# Patient Record
Sex: FEMALE | Race: WHITE | NOT HISPANIC OR LATINO | Employment: PART TIME | URBAN - METROPOLITAN AREA
[De-identification: names, ages, dates, MRNs, and addresses within clinical notes are randomized per-mention and may not be internally consistent; named-entity substitution may affect disease eponyms.]

---

## 2017-01-16 DIAGNOSIS — F33.0 MAJOR DEPRESSIVE DISORDER, RECURRENT EPISODE, MILD (H): Primary | ICD-10-CM

## 2017-01-16 RX ORDER — CITALOPRAM HYDROBROMIDE 20 MG/1
20 TABLET ORAL DAILY
Qty: 90 TABLET | Refills: 1 | Status: CANCELLED | OUTPATIENT
Start: 2017-01-16

## 2017-01-18 ENCOUNTER — E-VISIT (OUTPATIENT)
Dept: FAMILY MEDICINE | Facility: CLINIC | Age: 42
End: 2017-01-18
Payer: COMMERCIAL

## 2017-01-18 DIAGNOSIS — F33.0 MAJOR DEPRESSIVE DISORDER, RECURRENT EPISODE, MILD (H): Primary | ICD-10-CM

## 2017-01-18 PROCEDURE — 99444 ZZC PHYSICIAN ONLINE EVALUATION & MANAGEMENT SERVICE: CPT | Performed by: FAMILY MEDICINE

## 2017-01-18 NOTE — TELEPHONE ENCOUNTER
Per last OV notes:  (F33.0) Major depressive disorder, recurrent episode, mild (H)  Comment: doing well  Plan: citalopram (CELEXA) 20 MG tablet         Refill x 6 months, ok to do e-visit in 6 months for refill    My chart message sent with instructions to set up an E-visit.   Will await to make sure this is set up.     Katherine Hardy RN   [facility]

## 2017-01-19 RX ORDER — CITALOPRAM HYDROBROMIDE 20 MG/1
20 TABLET ORAL DAILY
Qty: 90 TABLET | Refills: 1 | Status: SHIPPED | OUTPATIENT
Start: 2017-01-19 | End: 2017-07-13

## 2017-01-30 DIAGNOSIS — E28.2 PCOS (POLYCYSTIC OVARIAN SYNDROME): Primary | ICD-10-CM

## 2017-04-24 ENCOUNTER — TELEPHONE (OUTPATIENT)
Dept: FAMILY MEDICINE | Facility: CLINIC | Age: 42
End: 2017-04-24

## 2017-04-24 NOTE — TELEPHONE ENCOUNTER
Zyrtec-D is not available as a generic at this time.  Pt is willing to try a comparable alternative.  Sharonda Cano M.A.

## 2017-04-24 NOTE — TELEPHONE ENCOUNTER
Pt needs to contact insurance, any alternative I order shows not covered.  This may not be a covered med.  I can give her a prescription for the med and she can call around for pricing at various pharmacies.

## 2017-04-25 NOTE — TELEPHONE ENCOUNTER
Notified patient of message below. States she will speak to pharmacy to get alternatives and will let us know./Vivian Paul,

## 2017-04-27 ENCOUNTER — MYC MEDICAL ADVICE (OUTPATIENT)
Dept: FAMILY MEDICINE | Facility: CLINIC | Age: 42
End: 2017-04-27

## 2017-04-27 DIAGNOSIS — J31.0 CHRONIC RHINITIS: Primary | ICD-10-CM

## 2017-04-27 RX ORDER — CETIRIZINE HYDROCHLORIDE 10 MG/1
10 TABLET ORAL EVERY EVENING
Qty: 90 TABLET | Refills: 3 | Status: SHIPPED | OUTPATIENT
Start: 2017-04-27 | End: 2017-07-13

## 2017-05-04 ENCOUNTER — TELEPHONE (OUTPATIENT)
Dept: FAMILY MEDICINE | Facility: CLINIC | Age: 42
End: 2017-05-04

## 2017-05-04 NOTE — TELEPHONE ENCOUNTER
Panel Management Review      Patient has the following on her problem list: None      Composite cancer screening  Chart review shows that this patient is due/due soon for the following Pap Smear  Summary:    Patient is due/failing the following:   PAP    Action needed:   Patient needs office visit for AFE .    Type of outreach:    Sent [employer] message.    Questions for provider review:    None                                                                                                                                    Lizz Rachel [facility]       Chart routed to Care Team .

## 2017-05-22 DIAGNOSIS — J31.0 CHRONIC RHINITIS: ICD-10-CM

## 2017-05-22 RX ORDER — CETIRIZINE HCL 10 MG
TABLET ORAL
Qty: 180 TABLET | Refills: 3 | Status: SHIPPED | OUTPATIENT
Start: 2017-05-22 | End: 2018-04-02

## 2017-05-26 ENCOUNTER — OFFICE VISIT (OUTPATIENT)
Dept: DERMATOLOGY | Facility: CLINIC | Age: 42
End: 2017-05-26
Payer: COMMERCIAL

## 2017-05-26 DIAGNOSIS — D18.01 CHERRY ANGIOMA: ICD-10-CM

## 2017-05-26 DIAGNOSIS — D22.9 MULTIPLE BENIGN NEVI: Primary | ICD-10-CM

## 2017-05-26 DIAGNOSIS — Z86.018 HISTORY OF DYSPLASTIC NEVUS: ICD-10-CM

## 2017-05-26 PROCEDURE — 99213 OFFICE O/P EST LOW 20 MIN: CPT | Performed by: DERMATOLOGY

## 2017-05-26 NOTE — LETTER
5/26/2017       RE: Ivania Gomez  [address] [facility] [place] [address]     Dear Colleague,    Thank you for referring your patient, Ivania Gomez, to the [facility] acute hospital. Please see a copy of my visit note below.    [facility] Dermatology Note      Dermatology Problem List:  1. History of dysplastic nevi  -Collision of two compound dysplastic nevi, both with mild atypia, left upper arm, s/p biopsy 5/17/2016  -Compound dysplastic melanocytic nevus, central chest, 6/14/2012    -Junctional dysplastic nevus with moderate atypia, margins narrowly free, 2010, right arm medial  -Compound dysplastic nevus with mild atypia, margins free, 2010, right arm lateral  -Compound dysplastic nevus with moderate atypia, margins free, left forearm, 2010  -Compound dysplastic nevus with mild atypia, left chest, 2010  -Compound dysplastic nevus with mild atypia, margins free, right inner arm, 2010  -Compound dysplastic nevus with moderate atypia, right ear, 2009  -Junctional dysplastic nevus with moderate atypia, right buttock, s/p excision 2009  -Junctional dysplastic nevus with moderate ayptia, negative margin, right posterior shoulder, 2009  -Inflamed junctional dysplastic nevus with moderate atypica, right superior shoulder, neg margins, 2009  -Junctional dysplastic nevus with moderate atypia, right anterior arm, 2009  -Junctional dysplastic nevus with moderate atypia, left superior shoulder, left inferior shoulder, left inner arm, 2009  -Dysplastic nevi, right chest and left breast  2.Irritated compound nevus, left buttock with mild to moderate cystologic atypia, 2007  3. Acne vulgaris  -Current Tx: Tretinoin 0.025% cream  -Previous Tx:  Differin, minocycline with dyspigmentation, doxycycline  4. Halo nevus, right neck, 2010    Encounter Date: May 26, 2017    CC:  Chief Complaint   Patient presents with     Derm Problem     1 yr skin check           History of Present Illness:  Ms. Ivania Gomez is a 41 year old female who presents as a follow-up for history of dysplastic nevi. The patient was last seen 5/17/2016 when tretinoin 0.025% cream was continued for acne and a biopsy was performed on the left upper arm. Today, the patient reports a lesion on the right forearm that she has had for a while but noticed a new blue coloration today.The patient reports no other lesions of concern.    Past Medical History:   Patient Active Problem List   Diagnosis     CARDIOVASCULAR SCREENING; LDL GOAL LESS THAN 160     Mild major depression (H)     PCOS (polycystic ovarian syndrome)     S/P laparoscopic cholecystectomy     Acne vulgaris     History of dysplastic nevus     Melanocytic nevus     History of gestational diabetes     Chronic rhinitis     Hypertriglyceridemia     Past Medical History:   Diagnosis Date     Acne vulgaris      Allergies      Anxiety      ASCUS on Pap smear 1994    colp benign     Depressive disorder     post partum and anxiety issues     Diabetes mellitus (H)      History of dysplastic nevus      Melanocytic nevus 6/14/2012     Obesity, unspecified      PCOS (polycystic ovarian syndrome)      Past Surgical History:   Procedure Laterality Date     CHOLECYSTECTOMY  2011     COLPOSCOPY CERVIX, BIOPSY CERVIX, ENDOCERVICAL CURETTAGE, COMBINED  1994    benign     EYE SURGERY  2009    lasix     EYE SURGERY      Lasik     Social History:  The patient works as an . The patient uses 1-2  alcoholic beverages per week. The patient denies use of tanning beds.    Family History:  There is no family history of melanoma.  There is no family history of asthma, eczema, psoriasis, or hay fever.   Family hx of dysplastic nevi in mother and sister.     Medications:  Current Outpatient Prescriptions   Medication Sig Dispense Refill     [employer] ALLERGY RELIEF-D 5-120 MG per 12 hr tablet TAKE ONE TAB BY MOUTH TWICE DAILY 180 tablet 3     cetirizine  (ZYRTEC) 10 MG tablet Take 1 tablet (10 mg) by mouth every evening 90 tablet 3     metFORMIN (GLUCOPHAGE) 500 MG tablet Take 1 tablet (500 mg) by mouth 2 times daily (with meals) 180 tablet 1     citalopram (CELEXA) 20 MG tablet Take 1 tablet (20 mg) by mouth daily 90 tablet 1     norethindrone-ethinyl estradiol (JUNEL FE 1/20) 1-20 MG-MCG per tablet Take 1 tablet by mouth daily 84 tablet 3     fluticasone (FLONASE) 50 MCG/ACT nasal spray Spray 1 spray into both nostrils 2 times daily 48 g 3     LORazepam (ATIVAN) 1 MG tablet Take 1 tablet 30 minutes prior to flight and as needed for severe anxiety. 20 tablet 0     multivitamin, therapeutic with minerals (MULTI-VITAMIN) TABS Take 1 tablet by mouth daily       Allergies   Allergen Reactions     Ibuprofen Hives and Swelling     Naproxen Hives and Swelling     Review of Systems:  -Const: The patient is generally feeling well today.   -Skin: As above in HPI. No additional skin concerns.     Physical exam:  GEN: This is a well developed, well-nourished female in no acute distress, in a pleasant mood.    SKIN: Total skin excluding the undergarment areas was performed. The exam included the head/face, neck, both arms, chest, back, abdomen, both legs, digits and/or nails. Declines genital exam.   -4mm pink papule with blue to violaceous coloration laterally on the right forearm.   -There are bright red some shaped papules scattered on the trunk.   -Multiple regular brown pigmented macules and papules are identified on the trunk and extremities.   -No other lesions of concern on areas examined.     Impression/Plan:  1. History of dysplastic nevi, no clinical evidence of recurrence  2. Cherry angiomas, trunk    No further intervention required at this time.   3. Multiple clinically benign nevi, trunk and extremities    No further intervention required at this time.   4. 4mm pink papule with blue to violaceous coloration laterally, right forearm. Possibly traumatic in nature  as only present one day. I reviewed with her this could be a melanoma. She would like to defer biopsy to the next 2-3 weeks    Discussed clinical monitoring versus biopsy. She understand we could be delaying diagnosis. She wants to hold off at this time    Photo obtained     Follow-up in 2 weeks earlier for new or changing lesions    Staff Involved:  Scribe/Staff    Scribe Disclosure:   I, Oralia Wandy, am serving as a scribe to document services personally performed by Dr. Charlee Reaves, based on data collection and the provider's statements to me.       Provider Disclosure:   The documentation recorded by the scribe accurately reflects the services I personally performed and the decisions made by me.    Charlee Reaves MD    Department of Dermatology  [facility]: Phone: [phone], Fax:[phone]  [facility]: Phone: [phone], Fax: [phone]        Again, thank you for allowing me to participate in the care of your patient.      Sincerely,    Charlee Reaves MD

## 2017-05-26 NOTE — PATIENT INSTRUCTIONS
Return for possible biopsy in 2 weeks as we need to make sure this is not a skin cancer on the right hand

## 2017-05-26 NOTE — MR AVS SNAPSHOT
After Visit Summary   5/26/2017    Ivania Gomez    MRN: [id number]           Patient Information     Date Of Birth          1975        Visit Information        Provider Department      5/26/2017 12:30 PM Charlee Reaves MD [facility] [[email or web address]]        Today's Diagnoses     Multiple benign nevi    -  1    Cherry angioma        History of dysplastic nevus          Care Instructions    Return for possible biopsy in 2 weeks as we need to make sure this is not a skin cancer on the right hand          Follow-ups after your visit        Your next 10 appointments already scheduled     Jul 13, 2017 11:35 AM CDT   PHYSICAL with Jacquelyn Zaldivar MD   [facility] ([facility])    [address] [address] [address] [phone]              Who to contact     If you have questions or need follow up information about today's clinic visit or your schedule please contact [facility] directly at [phone].  Normal or non-critical lab and imaging results will be communicated to you by Pebblehart, letter or phone within 4 business days after the clinic has received the results. If you do not hear from us within 7 days, please contact the clinic through Lootsie or phone. If you have a critical or abnormal lab result, we will notify you by phone as soon as possible.  Submit refill requests through Lootsie or call your pharmacy and they will forward the refill request to us. Please allow 3 business days for your refill to be completed.          Additional Information About Your Visit        Pebblehart Information     Lootsie gives you secure access to your electronic health record. If you see a primary care provider, you can also send messages to your care team and make appointments. If you have questions, please call your primary care clinic.  If you do not have a primary care provider, please call [phone] and they will assist you.      Lootsie is an  electronic gateway that provides easy, online access to your medical records. With [employer], you can request a clinic appointment, read your test results, renew a prescription or communicate with your care team.     To access your existing account, please contact your [facility] or call [phone] for assistance.        Care EveryWhere ID     This is your Care EveryWhere ID. This could be used by other organizations to access your [facility] medical records  [id number]         Blood Pressure from Last 3 Encounters:   07/12/16 129/88   06/02/15 128/87   04/14/14 130/77    Weight from Last 3 Encounters:   07/12/16 78.9 kg (174 lb)   06/02/15 76.2 kg (168 lb)   04/14/14 81.6 kg (180 lb)              Today, you had the following     No orders found for display       Primary Care Provider Office Phone # Fax #    Jacquelyn Zaldivar [phone]       [facility] [address] [address] [address]        Thank you!     Thank you for choosing [facility]  for your care. Our goal is always to provide you with excellent care. Hearing back from our patients is one way we can continue to improve our services. Please take a few minutes to complete the written survey that you may receive in the mail after your visit with us. Thank you!             Your Updated Medication List - Protect others around you: Learn how to safely use, store and throw away your medicines at www.disposemymeds.org.          This list is accurate as of: 5/26/17  1:27 PM.  Always use your most recent med list.                   Brand Name Dispense Instructions for use    cetirizine 10 MG tablet    zyrTEC    90 tablet    Take 1 tablet (10 mg) by mouth every evening       citalopram 20 MG tablet    celeXA    90 tablet    Take 1 tablet (20 mg) by mouth daily       [employer]S ALLERGY RELIEF-D 5-120 MG per 12 hr tablet   Generic drug:  cetirizine-psuedoePHEDrine     180 tablet    TAKE  ONE TAB BY MOUTH TWICE DAILY       fluticasone 50 MCG/ACT spray    FLONASE    48 g    Spray 1 spray into both nostrils 2 times daily       LORazepam 1 MG tablet    ATIVAN    20 tablet    Take 1 tablet 30 minutes prior to flight and as needed for severe anxiety.       metFORMIN 500 MG tablet    GLUCOPHAGE    180 tablet    Take 1 tablet (500 mg) by mouth 2 times daily (with meals)       Multi-vitamin Tabs tablet      Take 1 tablet by mouth daily       norethindrone-ethinyl estradiol 1-20 MG-MCG per tablet    JUNEL FE 1/20    84 tablet    Take 1 tablet by mouth daily

## 2017-05-26 NOTE — PROGRESS NOTES
[facility] Dermatology Note      Dermatology Problem List:  1. History of dysplastic nevi  -Collision of two compound dysplastic nevi, both with mild atypia, left upper arm, s/p biopsy 5/17/2016  -Compound dysplastic melanocytic nevus, central chest, 6/14/2012    -Junctional dysplastic nevus with moderate atypia, margins narrowly free, 2010, right arm medial  -Compound dysplastic nevus with mild atypia, margins free, 2010, right arm lateral  -Compound dysplastic nevus with moderate atypia, margins free, left forearm, 2010  -Compound dysplastic nevus with mild atypia, left chest, 2010  -Compound dysplastic nevus with mild atypia, margins free, right inner arm, 2010  -Compound dysplastic nevus with moderate atypia, right ear, 2009  -Junctional dysplastic nevus with moderate atypia, right buttock, s/p excision 2009  -Junctional dysplastic nevus with moderate ayptia, negative margin, right posterior shoulder, 2009  -Inflamed junctional dysplastic nevus with moderate atypica, right superior shoulder, neg margins, 2009  -Junctional dysplastic nevus with moderate atypia, right anterior arm, 2009  -Junctional dysplastic nevus with moderate atypia, left superior shoulder, left inferior shoulder, left inner arm, 2009  -Dysplastic nevi, right chest and left breast  2.Irritated compound nevus, left buttock with mild to moderate cystologic atypia, 2007  3. Acne vulgaris  -Current Tx: Tretinoin 0.025% cream  -Previous Tx:  Differin, minocycline with dyspigmentation, doxycycline  4. Halo nevus, right neck, 2010    Encounter Date: May 26, 2017    CC:  Chief Complaint   Patient presents with     Derm Problem     1 yr skin check          History of Present Illness:  Ms. Ivania Gomez is a 41 year old female who presents as a follow-up for history of dysplastic nevi. The patient was last seen 5/17/2016 when tretinoin 0.025% cream was continued for acne and a biopsy was performed on the left upper arm. Today, the  patient reports a lesion on the right forearm that she has had for a while but noticed a new blue coloration today.The patient reports no other lesions of concern.    Past Medical History:   Patient Active Problem List   Diagnosis     CARDIOVASCULAR SCREENING; LDL GOAL LESS THAN 160     Mild major depression (H)     PCOS (polycystic ovarian syndrome)     S/P laparoscopic cholecystectomy     Acne vulgaris     History of dysplastic nevus     Melanocytic nevus     History of gestational diabetes     Chronic rhinitis     Hypertriglyceridemia     Past Medical History:   Diagnosis Date     Acne vulgaris      Allergies      Anxiety      ASCUS on Pap smear 1994    colp benign     Depressive disorder     post partum and anxiety issues     Diabetes mellitus (H)      History of dysplastic nevus      Melanocytic nevus 6/14/2012     Obesity, unspecified      PCOS (polycystic ovarian syndrome)      Past Surgical History:   Procedure Laterality Date     CHOLECYSTECTOMY  2011     COLPOSCOPY CERVIX, BIOPSY CERVIX, ENDOCERVICAL CURETTAGE, COMBINED  1994    benign     EYE SURGERY  2009    lasix     EYE SURGERY      Lasik     Social History:  The patient works as an . The patient uses 1-2  alcoholic beverages per week. The patient denies use of tanning beds.    Family History:  There is no family history of melanoma.  There is no family history of asthma, eczema, psoriasis, or hay fever.   Family hx of dysplastic nevi in mother and sister.     Medications:  Current Outpatient Prescriptions   Medication Sig Dispense Refill     CVS ALLERGY RELIEF-D 5-120 MG per 12 hr tablet TAKE ONE TAB BY MOUTH TWICE DAILY 180 tablet 3     cetirizine (ZYRTEC) 10 MG tablet Take 1 tablet (10 mg) by mouth every evening 90 tablet 3     metFORMIN (GLUCOPHAGE) 500 MG tablet Take 1 tablet (500 mg) by mouth 2 times daily (with meals) 180 tablet 1     citalopram (CELEXA) 20 MG tablet Take 1 tablet (20 mg) by mouth daily 90 tablet 1      norethindrone-ethinyl estradiol (JUNEL FE 1/20) 1-20 MG-MCG per tablet Take 1 tablet by mouth daily 84 tablet 3     fluticasone (FLONASE) 50 MCG/ACT nasal spray Spray 1 spray into both nostrils 2 times daily 48 g 3     LORazepam (ATIVAN) 1 MG tablet Take 1 tablet 30 minutes prior to flight and as needed for severe anxiety. 20 tablet 0     multivitamin, therapeutic with minerals (MULTI-VITAMIN) TABS Take 1 tablet by mouth daily       Allergies   Allergen Reactions     Ibuprofen Hives and Swelling     Naproxen Hives and Swelling     Review of Systems:  -Const: The patient is generally feeling well today.   -Skin: As above in HPI. No additional skin concerns.     Physical exam:  GEN: This is a well developed, well-nourished female in no acute distress, in a pleasant mood.    SKIN: Total skin excluding the undergarment areas was performed. The exam included the head/face, neck, both arms, chest, back, abdomen, both legs, digits and/or nails. Declines genital exam.   -4mm pink papule with blue to violaceous coloration laterally on the right forearm.   -There are bright red some shaped papules scattered on the trunk.   -Multiple regular brown pigmented macules and papules are identified on the trunk and extremities.   -No other lesions of concern on areas examined.     Impression/Plan:  1. History of dysplastic nevi, no clinical evidence of recurrence  2. Cherry angiomas, trunk    No further intervention required at this time.   3. Multiple clinically benign nevi, trunk and extremities    No further intervention required at this time.   4. 4mm pink papule with blue to violaceous coloration laterally, right forearm. Possibly traumatic in nature as only present one day. I reviewed with her this could be a melanoma. She would like to defer biopsy to the next 2-3 weeks    Discussed clinical monitoring versus biopsy. She understand we could be delaying diagnosis. She wants to hold off at this time    Photo obtained      Follow-up in 2 weeks earlier for new or changing lesions    Staff Involved:  Scribe/Staff    Scribe Disclosure:   I, Oralia Saba, am serving as a scribe to document services personally performed by Dr. Charlee Reaves, based on data collection and the provider's statements to me.       Provider Disclosure:   The documentation recorded by the scribe accurately reflects the services I personally performed and the decisions made by me.    Charlee Reaves MD    Department of Dermatology  [facility]: Phone: [phone], Fax:[phone]  [facility]: Phone: [phone], Fax: [phone]

## 2017-06-09 ENCOUNTER — OFFICE VISIT (OUTPATIENT)
Dept: DERMATOLOGY | Facility: CLINIC | Age: 42
End: 2017-06-09
Payer: COMMERCIAL

## 2017-06-09 DIAGNOSIS — D48.5 NEOPLASM OF UNCERTAIN BEHAVIOR OF SKIN: Primary | ICD-10-CM

## 2017-06-09 DIAGNOSIS — Z86.018 HISTORY OF DYSPLASTIC NEVUS: ICD-10-CM

## 2017-06-09 PROCEDURE — 88305 TISSUE EXAM BY PATHOLOGIST: CPT | Performed by: DERMATOLOGY

## 2017-06-09 PROCEDURE — 11100 HC BIOPSY SKIN/SUBQ/MUC MEM, SINGLE LESION: CPT | Performed by: DERMATOLOGY

## 2017-06-09 NOTE — MR AVS SNAPSHOT
After Visit Summary   6/9/2017    Ivania Gomez    MRN: [id number]           Patient Information     Date Of Birth          1975        Visit Information        Provider Department      6/9/2017 2:45 PM Charlee Reaves MD [facility]        Today's Diagnoses     Neoplasm of uncertain behavior of skin    -  1      Care Instructions    Wound Care After a Biopsy    What is a skin biopsy?  A skin biopsy allows the doctor to examine a very small piece of tissue under the microscope to determine the diagnosis and the best treatment for the skin condition. A local anesthetic (numbing medicine)  is injected with a very small needle into the skin area to be tested. A small piece of skin is taken from the area. Sometimes a suture (stitch) is used.     What are the risks of a skin biopsy?  I will experience scar, bleeding, swelling, pain, crusting and redness. I may experience incomplete removal or recurrence. Risks of this procedure are excessive bleeding, bruising, infection, nerve damage, numbness, thick (hypertrophic or keloidal) scar and non-diagnostic biopsy.    How should I care for my wound for the first 24 hours?    Keep the wound dry and covered for 24 hours    If it bleeds, hold direct pressure on the area for 15 minutes. If bleeding does not stop then go to the emergency room    Avoid strenuous exercise the first 1-2 days or as your doctor instructs you    How should I care for the wound after 24 hours?    After 24 hours, remove the bandage    You may bathe or shower as normal    If you had a scalp biopsy, you can shampoo as usual and can use shower water to clean the biopsy site daily    Clean the wound twice a day with gentle soap and water    Do not scrub, be gentle    Apply white petroleum/Vaseline after cleaning the wound with a cotton swab or a clean finger, and keep the site covered with a Bandaid /bandage. Bandages are not necessary with a scalp biopsy    If you are unable  to cover the site with a Bandaid /bandage, re-apply ointment 2-3 times a day to keep the site moist. Moisture will help with healing    Avoid strenuous activity for first 1-2 days    Avoid lakes, rivers, pools, and oceans until the stitches are removed or the site is healed    How do I clean my wound?    Wash hands thoroughly with soap or use hand  before all wound care    Clean the wound with gentle soap and water    Apply white petroleum/Vaseline  to wound after it is clean    Replace the Bandaid /bandage to keep the wound covered for the first few days or as instructed by your doctor    If you had a scalp biopsy, warm shower water to the area on a daily basis should suffice    What should I use to clean my wound?     Cotton-tipped applicators (Qtips )    White petroleum jelly (Vaseline ). Use a clean new container and use Q-tips to apply.    Bandaids   as needed    Gentle soap     How should I care for my wound long term?    Do not get your wound dirty    Keep up with wound care for one week or until the area is healed.    A small scab will form and fall off by itself when the area is completely healed. The area will be red and will become pink in color as it heals. Sun protection is very important for how your scar will turn out. Sunscreen with an SPF 30 or greater is recommended once the area is healed.    If you have stitches, stitches need to be removed in 11-14 days. You may return to our clinic for this or you may have it done locally at your doctor s office.    You should have some soreness but it should be mild and slowly go away over several days. Talk to your doctor about using tylenol for pain,    When should I call my doctor?  If you have increased:     Pain or swelling    Pus or drainage (clear or slightly yellow drainage is ok)    Temperature over 100F    Spreading redness or warmth around wound    When will I hear about my results?  The biopsy results can take 2-3 weeks to come back. The  clinic will call you with the results, send you a [employer] message, or have you schedule a follow-up clinic or phone time to discuss the results. Contact our clinics if you do not hear from us in 3 weeks.     Who should I call with questions?    [facility]: [phone]     [facility]: [phone]    For urgent needs outside of business hours call the [facility] at [phone] and ask for the dermatology resident on call    [place] Locations for Suture Removal    Refer to your after visit summary for reference on when to have sutures removed.   You may have sutures removed at this clinic or most convenient location to schedule nursing visit for removal.     [place]   [address] [facility] [address]  [phone]   [place]  [address]  [facility] [address]  [phone]     [place]  [address] [facility] [address]  [phone] [place]  [address] [facility] [address]  [phone]   [place]   [address]  [facility] [address]  [phone]   [place]  [address] [facility] [address] [facility] [address] [place] [address]  [phone]     [place]  [address]  [place] [address]  [phone]   [place]  [address]  [facility] [address]  [phone]   [place]  [address]  [facility] [address]  [phone]   [place]que   [address] [facility] [address]  [place] [address]  [phone]     [place]  [address]  [facility] [address]  [phone] [place]  [address] [facility]  [place] [address]  [phone]   [place]  [address]  [place] [address]  [phone]   [place]  [address]  [place] [address]  [phone]     [place] Castaneda  [address]  [facility] [address]  [phone]   [place]  [address]  [place] [address]  [phone]   [address] [address]  [phone]     [address]  [place] [address]  [phone] [place], to  [address]  [facility] [address]  [phone] [place]  [address]  [place] [address]  [phone]   [facility]  [address]  [facility] [address]  [phone]     [place]  [address]  [facility] [address]  [phone] [place], [place]?  [address] [facility] [place] [address]  [phone]   [place]  [address]  [place] [address]  [phone]   [facility] [place]  [address] [facility] [address]  [phone]   [facility]  [address]  [facility] [place] [address]  [phone]     [place]  [address] [facility] [address]  [phone] [place]  [address]  [place] [address]  [phone]   [place]   [address]  [place] [address]  [phone]     [place]  [address] [place] [address]  [phone]   [place]  [address] [place] [address]  [phone]   [place]  [address] [place] [address]  [phone]   [place]  [address]  [facility] [address]  [phone]     [place]  [address]  [facility] [address]  [phone]   [place]  [address] [facility] [place] [address]  [phone]    [place]  [address]  [place] [address]  [phone]   [place]  [address]  [facility] [address]  [phone]   [address] [place] [address]  [phone]                  Follow-ups after your visit        Your next 10 appointments already scheduled     Jul 13, 2017 11:35 AM CDT   PHYSICAL with [facility]qu[facility]   [facility]    [address] [facility] [address] [phone]              Who to contact     If you have questions or need follow up information about today's clinic visit or your schedule please contact [facility] directly at [phone].  Normal or non-critical lab and imaging  results will be communicated to you by MyChart, letter or phone within 4 business days after the clinic has received the results. If you do not hear from us within 7 days, please contact the clinic through [employer] or phone. If you have a critical or abnormal lab result, we will notify you by phone as soon as possible.  Submit refill requests through [employer] or call your pharmacy and they will forward the refill request to us. Please allow 3 business days for your refill to be completed.          Additional Information About Your Visit        [employer] Information     [employer] gives you secure access to your electronic health record. If you see a primary care provider, you can also send messages to your care team and make appointments. If you have questions, please call your primary care clinic.  If you do not have a primary care provider, please call [phone] and they will assist you.      [employer] is an electronic gateway that provides easy, online access to your medical records. With [employer], you can request a clinic appointment, read your test results, renew a prescription or communicate with your care team.     To access your existing account, please contact your [facility] or call [phone] for assistance.        Care EveryWhere ID     This is your Care EveryWhere ID. This could be used by other organizations to access your [place] medical records  [id number]         Blood Pressure from Last 3 Encounters:   07/12/16 129/88   06/02/15 128/87   04/14/14 130/77    Weight from Last 3 Encounters:   07/12/16 174 lb (78.9 kg)   06/02/15 168 lb (76.2 kg)   04/14/14 180 lb (81.6 kg)              Today, you had the following     No orders found for display       Primary Care Provider Office Phone # Fax #    Jacquelyn Zaldivar [phone]       [facility] [address] [facility] [address]        Thank you!     Thank you for choosing [facility]  for your care. Our goal is always to provide you with excellent care. Hearing back from our patients is one way we can continue to improve our services. Please take a few minutes to complete the written survey that you may receive in the mail after your visit with us. Thank you!             Your Updated Medication List - Protect others around you: Learn how to safely use, store and throw away your medicines at www.disposemymeds.org.          This list is accurate as of: 6/9/17  3:46 PM.  Always use your most recent med list.                   Brand Name Dispense Instructions for use    cetirizine 10 MG tablet    zyrTEC    90 tablet    Take 1 tablet (10 mg) by mouth every evening       citalopram 20 MG tablet    celeXA    90 tablet    Take 1 tablet (20 mg) by mouth daily       [employer] ALLERGY RELIEF-D 5-120 MG per 12 hr tablet   Generic drug:  cetirizine-psuedoePHEDrine     180 tablet    TAKE ONE TAB BY MOUTH TWICE DAILY       fluticasone 50 MCG/ACT spray    FLONASE    48 g    Spray 1 spray into both nostrils 2 times daily       LORazepam 1 MG tablet    ATIVAN    20 tablet    Take 1 tablet 30 minutes prior to flight and as needed for severe anxiety.       metFORMIN 500 MG tablet    GLUCOPHAGE    180 tablet    Take 1 tablet (500 mg) by mouth 2 times daily (with meals)       Multi-vitamin Tabs tablet      Take 1 tablet by mouth daily       norethindrone-ethinyl estradiol 1-20 MG-MCG per tablet    JUNEL FE 1/20    84 tablet    Take 1 tablet by mouth daily

## 2017-06-09 NOTE — PATIENT INSTRUCTIONS
Wound Care After a Biopsy    What is a skin biopsy?  A skin biopsy allows the doctor to examine a very small piece of tissue under the microscope to determine the diagnosis and the best treatment for the skin condition. A local anesthetic (numbing medicine)  is injected with a very small needle into the skin area to be tested. A small piece of skin is taken from the area. Sometimes a suture (stitch) is used.     What are the risks of a skin biopsy?  I will experience scar, bleeding, swelling, pain, crusting and redness. I may experience incomplete removal or recurrence. Risks of this procedure are excessive bleeding, bruising, infection, nerve damage, numbness, thick (hypertrophic or keloidal) scar and non-diagnostic biopsy.    How should I care for my wound for the first 24 hours?    Keep the wound dry and covered for 24 hours    If it bleeds, hold direct pressure on the area for 15 minutes. If bleeding does not stop then go to the emergency room    Avoid strenuous exercise the first 1-2 days or as your doctor instructs you    How should I care for the wound after 24 hours?    After 24 hours, remove the bandage    You may bathe or shower as normal    If you had a scalp biopsy, you can shampoo as usual and can use shower water to clean the biopsy site daily    Clean the wound twice a day with gentle soap and water    Do not scrub, be gentle    Apply white petroleum/Vaseline after cleaning the wound with a cotton swab or a clean finger, and keep the site covered with a Bandaid /bandage. Bandages are not necessary with a scalp biopsy    If you are unable to cover the site with a Bandaid /bandage, re-apply ointment 2-3 times a day to keep the site moist. Moisture will help with healing    Avoid strenuous activity for first 1-2 days    Avoid lakes, rivers, pools, and oceans until the stitches are removed or the site is healed    How do I clean my wound?    Wash hands thoroughly with soap or use hand  before all  wound care    Clean the wound with gentle soap and water    Apply white petroleum/Vaseline  to wound after it is clean    Replace the Bandaid /bandage to keep the wound covered for the first few days or as instructed by your doctor    If you had a scalp biopsy, warm shower water to the area on a daily basis should suffice    What should I use to clean my wound?     Cotton-tipped applicators (Qtips )    White petroleum jelly (Vaseline ). Use a clean new container and use Q-tips to apply.    Bandaids   as needed    Gentle soap     How should I care for my wound long term?    Do not get your wound dirty    Keep up with wound care for one week or until the area is healed.    A small scab will form and fall off by itself when the area is completely healed. The area will be red and will become pink in color as it heals. Sun protection is very important for how your scar will turn out. Sunscreen with an SPF 30 or greater is recommended once the area is healed.    If you have stitches, stitches need to be removed in 11-14 days. You may return to our clinic for this or you may have it done locally at your doctor s office.    You should have some soreness but it should be mild and slowly go away over several days. Talk to your doctor about using tylenol for pain,    When should I call my doctor?  If you have increased:     Pain or swelling    Pus or drainage (clear or slightly yellow drainage is ok)    Temperature over 100F    Spreading redness or warmth around wound    When will I hear about my results?  The biopsy results can take 2-3 weeks to come back. The clinic will call you with the results, send you a [employer] message, or have you schedule a follow-up clinic or phone time to discuss the results. Contact our clinics if you do not hear from us in 3 weeks.     Who should I call with questions?    [facility]: [phone]     [facility]: [phone]    For  urgent needs outside of business hours call the [facility] at [phone] and ask for the dermatology resident on call    [place] Locations for Suture Removal    Refer to your after visit summary for reference on when to have sutures removed.   You may have sutures removed at this clinic or most convenient location to schedule nursing visit for removal.     [place]   [address] [facility] [address]  [phone]   [place]  [address]  [facility] [address]  [phone]     [place]  [address] [facility] [address]  [phone] [place]  [address] [facility] [address]  [phone]   [place]   [address]  [facility] [address]  [phone]   [place]  [address] [facility] [facility] [address] [place] [address]  [phone]     [place]  [address]  [place] [address]  [phone]   [place]  [address]  [facility] [address]  [phone]   [place]  [address]  [facility] [address]  [phone]   [place]   [address] [facility] [address] [address] [address]  [place] [address]  [phone]     [place]  [address]  [facility] [address]  [phone] [place]  [address] [facility]  [place] [address]  [phone]   [place]  [address]  [place] [address]  [phone]   [place]  [address]  [place] [address]  [phone]     [place]  [address]  [facility] [address]  [phone]   [place]  [address]  [place] [address]  [phone]   [facility]  [address] [facility] [address]  [phone]     [place]  [address]  [facility] [address]  [phone] [facility]  [address] [facility] [address]  [facility] [address]  [phone] [place]  [address] [facility] [address]  [phone]   [facility]  [address]  [facility] [address]  [phone]     [place]  [address] [facility] [address]  [phone] [facility]?  [address] [facility] [place] [address]  [phone]   [place]  [address]  [place] [address]  [phone]   [facility]  [address]  [facility] [address]  [phone]   [place]  [address]  [place] [address]  [phone]     [place]  [address] [facility] [place] [address]  [phone] [place]  [address]  [place] [address]  [phone]   [place]   [address]  [place] [address]  [phone]     [place]  [address] [place] [address]  [phone]   [place]  [address] [place] [address]  [phone]   [place]  [address] [place] [address]  [phone]   [place]  [address]  [facility] [address]  [phone]     [place]  [address] [facility] [address]  [phone]   [place]  [address]  hospitals [address]  [phone]    [place]  [address]  [facility] [address]  [phone]   [place]  [address]  [facility] [address]  [phone]   [place]  [facility] [address] [place] [address]  [phone]

## 2017-06-09 NOTE — PROGRESS NOTES
[facility] Dermatology Note      Dermatology Problem List:  1. History of dysplastic nevi  -Collision of two compound dysplastic nevi, both with mild atypia, left upper arm, s/p biopsy 5/17/2016  -Compound dysplastic melanocytic nevus, central chest, 6/14/2012    -Junctional dysplastic nevus with moderate atypia, margins narrowly free, 2010, right arm medial  -Compound dysplastic nevus with mild atypia, margins free, 2010, right arm lateral  -Compound dysplastic nevus with moderate atypia, margins free, left forearm, 2010  -Compound dysplastic nevus with mild atypia, left chest, 2010  -Compound dysplastic nevus with mild atypia, margins free, right inner arm, 2010  -Compound dysplastic nevus with moderate atypia, right ear, 2009  -Junctional dysplastic nevus with moderate atypia, right buttock, s/p excision 2009  -Junctional dysplastic nevus with moderate ayptia, negative margin, right posterior shoulder, 2009  -Inflamed junctional dysplastic nevus with moderate atypica, right superior shoulder, neg margins, 2009  -Junctional dysplastic nevus with moderate atypia, right anterior arm, 2009  -Junctional dysplastic nevus with moderate atypia, left superior shoulder, left inferior shoulder, left inner arm, 2009  -Dysplastic nevi, right chest and left breast  2.Irritated compound nevus, left buttock with mild to moderate cystologic atypia, 2007  3. Acne vulgaris  -Current Tx: Tretinoin 0.025% cream  -Previous Tx:  Differin, minocycline with dyspigmentation, doxycycline  4. Halo nevus, right neck, 2010    Encounter Date: Jun 9, 2017    CC:  Chief Complaint   Patient presents with     RECHECK     2 week follow up, spot on arm         History of Present Illness:  Ms. Ivania Gomez is a 41 year old female with history of dysplastic nevi presents as a follow up for right forearm. Today, the patient reports the lesion on the right forearm is still present. The patient reports no other lesions of  concern.    Past Medical History:   Patient Active Problem List   Diagnosis     CARDIOVASCULAR SCREENING; LDL GOAL LESS THAN 160     Mild major depression (H)     PCOS (polycystic ovarian syndrome)     S/P laparoscopic cholecystectomy     Acne vulgaris     History of dysplastic nevus     Melanocytic nevus     History of gestational diabetes     Chronic rhinitis     Hypertriglyceridemia     Past Medical History:   Diagnosis Date     Acne vulgaris      Allergies      Anxiety      ASCUS on Pap smear 1994    colp benign     Depressive disorder     post partum and anxiety issues     Diabetes mellitus (H)      History of dysplastic nevus      Melanocytic nevus 6/14/2012     Obesity, unspecified      PCOS (polycystic ovarian syndrome)      Past Surgical History:   Procedure Laterality Date     CHOLECYSTECTOMY  2011     COLPOSCOPY CERVIX, BIOPSY CERVIX, ENDOCERVICAL CURETTAGE, COMBINED  1994    benign     EYE SURGERY  2009    lasix     EYE SURGERY      Lasik     Social History:  The patient works as an . The patient uses 1-2  alcoholic beverages per week. The patient denies use of tanning beds.    Family History:  There is no family history of melanoma.  There is no family history of asthma, eczema, psoriasis, or hay fever.   Family hx of dysplastic nevi in mother and sister.     Medications:  Current Outpatient Prescriptions   Medication Sig Dispense Refill     [employer] ALLERGY RELIEF-D 5-120 MG per 12 hr tablet TAKE ONE TAB BY MOUTH TWICE DAILY 180 tablet 3     cetirizine (ZYRTEC) 10 MG tablet Take 1 tablet (10 mg) by mouth every evening 90 tablet 3     metFORMIN (GLUCOPHAGE) 500 MG tablet Take 1 tablet (500 mg) by mouth 2 times daily (with meals) 180 tablet 1     citalopram (CELEXA) 20 MG tablet Take 1 tablet (20 mg) by mouth daily 90 tablet 1     norethindrone-ethinyl estradiol (JUNEL FE 1/20) 1-20 MG-MCG per tablet Take 1 tablet by mouth daily 84 tablet 3     fluticasone (FLONASE) 50 MCG/ACT nasal spray  Spray 1 spray into both nostrils 2 times daily 48 g 3     LORazepam (ATIVAN) 1 MG tablet Take 1 tablet 30 minutes prior to flight and as needed for severe anxiety. 20 tablet 0     multivitamin, therapeutic with minerals (MULTI-VITAMIN) TABS Take 1 tablet by mouth daily       Allergies   Allergen Reactions     Ibuprofen Hives and Swelling     Naproxen Hives and Swelling     Review of Systems:  -Skin: As above in HPI. No additional skin concerns.     Physical exam:  GEN: This is a well developed, well-nourished female in no acute distress, in a pleasant mood.    SKIN: Focused examination of the right forearm was performed.  -Pink 4mm papule with blue to violaceous coloration laterally on the right forearm.   -No other lesions of concern on areas examined.     Impression/Plan:  1. History of dysplastic nevi, no clinical evidence of recurrence    Last total skin exam 5/26/2017  2. 4mm pink papule with blue to violaceous coloration laterally, right forearm. Neoplasm of uncertain behavior. Differential diagnosis includes nevus with inflammation versus combined nevus versus malignant melanoma.    Punch biopsy:  After discussion of benefits and risks including but not limited to bleeding/bruising, pain/swelling, infection, scar, incomplete removal, nerve damage/numbness, recurrence, and non-diagnostic biopsy, written consent, verbal consent and photographs were obtained. Time-out was performed. The area was cleaned with isopropyl alcohol. 0.5 mL of 1% lidocaine with epinephrine was injected to obtain adequate anesthesia of the lesion on the right forearm. A 4 mm punch biopsy was performed.  4-0 prolene sutures were utilized to approximate the epidermal edges.  White petroleum jelly/VaselineTM and a bandage was applied to the wound.  Explicit verbal and written wound care instructions were provided.  The patient left the Dermatology Clinic in good condition. The patient was counseled to follow up for suture removal in  approximately 11-14 days.     Follow-up in 5/2018 for skin exam, earlier pending biopsy, earlier for new or changing lesions    Staff Involved:  Scribe/Staff    Scribe Disclosure:   I, Oralia Saba, am serving as a scribe to document services personally performed by Dr. Charlee Reaves, based on data collection and the provider's statements to me.     Provider Disclosure:   The documentation recorded by the scribe accurately reflects the services I personally performed and the decisions made by me.    Charlee Reaves MD    Department of Dermatology  [facility]: Phone: [phone], Fax:[phone]  [facility]: Phone: [phone], Fax: [phone]

## 2017-06-09 NOTE — LETTER
6/9/2017       RE: Ivania Gomez  [address] [address] [facility] [place] [address]     Dear Colleague,    Thank you for referring your patient, Ivania Gomez, to the [facility]. Please see a copy of my visit note below.    [facility] Dermatology Note      Dermatology Problem List:  1. History of dysplastic nevi  -Collision of two compound dysplastic nevi, both with mild atypia, left upper arm, s/p biopsy 5/17/2016  -Compound dysplastic melanocytic nevus, central chest, 6/14/2012    -Junctional dysplastic nevus with moderate atypia, margins narrowly free, 2010, right arm medial  -Compound dysplastic nevus with mild atypia, margins free, 2010, right arm lateral  -Compound dysplastic nevus with moderate atypia, margins free, left forearm, 2010  -Compound dysplastic nevus with mild atypia, left chest, 2010  -Compound dysplastic nevus with mild atypia, margins free, right inner arm, 2010  -Compound dysplastic nevus with moderate atypia, right ear, 2009  -Junctional dysplastic nevus with moderate atypia, right buttock, s/p excision 2009  -Junctional dysplastic nevus with moderate ayptia, negative margin, right posterior shoulder, 2009  -Inflamed junctional dysplastic nevus with moderate atypica, right superior shoulder, neg margins, 2009  -Junctional dysplastic nevus with moderate atypia, right anterior arm, 2009  -Junctional dysplastic nevus with moderate atypia, left superior shoulder, left inferior shoulder, left inner arm, 2009  -Dysplastic nevi, right chest and left breast  2.Irritated compound nevus, left buttock with mild to moderate cystologic atypia, 2007  3. Acne vulgaris  -Current Tx: Tretinoin 0.025% cream  -Previous Tx:  Differin, minocycline with dyspigmentation, doxycycline  4. Halo nevus, right neck, 2010    Encounter Date: Jun 9, 2017    CC:  Chief Complaint   Patient presents with     RECHECK     2 week follow up, spot  on arm         History of Present Illness:  Ms. Ivania Gomez is a 41 year old female with history of dysplastic nevi presents as a follow up for right forearm. Today, the patient reports the lesion on the right forearm is still present. The patient reports no other lesions of concern.    Past Medical History:   Patient Active Problem List   Diagnosis     CARDIOVASCULAR SCREENING; LDL GOAL LESS THAN 160     Mild major depression (H)     PCOS (polycystic ovarian syndrome)     S/P laparoscopic cholecystectomy     Acne vulgaris     History of dysplastic nevus     Melanocytic nevus     History of gestational diabetes     Chronic rhinitis     Hypertriglyceridemia     Past Medical History:   Diagnosis Date     Acne vulgaris      Allergies      Anxiety      ASCUS on Pap smear 1994    colp benign     Depressive disorder     post partum and anxiety issues     Diabetes mellitus (H)      History of dysplastic nevus      Melanocytic nevus 6/14/2012     Obesity, unspecified      PCOS (polycystic ovarian syndrome)      Past Surgical History:   Procedure Laterality Date     CHOLECYSTECTOMY  2011     COLPOSCOPY CERVIX, BIOPSY CERVIX, ENDOCERVICAL CURETTAGE, COMBINED  1994    benign     EYE SURGERY  2009    lasix     EYE SURGERY      Lasik     Social History:  The patient works as an . The patient uses 1-2  alcoholic beverages per week. The patient denies use of tanning beds.    Family History:  There is no family history of melanoma.  There is no family history of asthma, eczema, psoriasis, or hay fever.   Family hx of dysplastic nevi in mother and sister.     Medications:  Current Outpatient Prescriptions   Medication Sig Dispense Refill     [employer] ALLERGY RELIEF-D 5-120 MG per 12 hr tablet TAKE ONE TAB BY MOUTH TWICE DAILY 180 tablet 3     cetirizine (ZYRTEC) 10 MG tablet Take 1 tablet (10 mg) by mouth every evening 90 tablet 3     metFORMIN (GLUCOPHAGE) 500 MG tablet Take 1 tablet (500 mg) by mouth 2 times daily  (with meals) 180 tablet 1     citalopram (CELEXA) 20 MG tablet Take 1 tablet (20 mg) by mouth daily 90 tablet 1     norethindrone-ethinyl estradiol (JUNEL FE 1/20) 1-20 MG-MCG per tablet Take 1 tablet by mouth daily 84 tablet 3     fluticasone (FLONASE) 50 MCG/ACT nasal spray Spray 1 spray into both nostrils 2 times daily 48 g 3     LORazepam (ATIVAN) 1 MG tablet Take 1 tablet 30 minutes prior to flight and as needed for severe anxiety. 20 tablet 0     multivitamin, therapeutic with minerals (MULTI-VITAMIN) TABS Take 1 tablet by mouth daily       Allergies   Allergen Reactions     Ibuprofen Hives and Swelling     Naproxen Hives and Swelling     Review of Systems:  -Skin: As above in HPI. No additional skin concerns.     Physical exam:  GEN: This is a well developed, well-nourished female in no acute distress, in a pleasant mood.    SKIN: Focused examination of the right forearm was performed.  -Pink 4mm papule with blue to violaceous coloration laterally on the right forearm.   -No other lesions of concern on areas examined.     Impression/Plan:  1. History of dysplastic nevi, no clinical evidence of recurrence    Last total skin exam 5/26/2017  2. 4mm pink papule with blue to violaceous coloration laterally, right forearm. Neoplasm of uncertain behavior. Differential diagnosis includes nevus with inflammation versus combined nevus versus malignant melanoma.    Punch biopsy:  After discussion of benefits and risks including but not limited to bleeding/bruising, pain/swelling, infection, scar, incomplete removal, nerve damage/numbness, recurrence, and non-diagnostic biopsy, written consent, verbal consent and photographs were obtained. Time-out was performed. The area was cleaned with isopropyl alcohol. 0.5 mL of 1% lidocaine with epinephrine was injected to obtain adequate anesthesia of the lesion on the right forearm. A 4 mm punch biopsy was performed.  4-0 prolene sutures were utilized to approximate the epidermal  edges.  White petroleum jelly/VaselineTM and a bandage was applied to the wound.  Explicit verbal and written wound care instructions were provided.  The patient left the Dermatology Clinic in good condition. The patient was counseled to follow up for suture removal in approximately 11-14 days.     Follow-up in 5/2018 for skin exam, earlier pending biopsy, earlier for new or changing lesions    Staff Involved:  Scribe/Staff    Scribe Disclosure:   I, Oralia Saba, am serving as a scribe to document services personally performed by Dr. Charlee Reaves, based on data collection and the provider's statements to me.     Provider Disclosure:   The documentation recorded by the scribe accurately reflects the services I personally performed and the decisions made by me.    Charlee Reaves MD    Department of Dermatology  [facility]: Phone: [phone], Fax:[phone]  [facility]: Phone: [phone], Fax: [phone]        Again, thank you for allowing me to participate in the care of your patient.      Sincerely,    Charlee Reaves MD

## 2017-06-09 NOTE — NURSING NOTE
Dermatology Rooming Note    Ivania Gomez's goals for this visit include:   Chief Complaint   Patient presents with     RECHECK     2 week follow up, spot on arm       Is a scribe okay for this visit:YES     Are records needed for this visit(If yes, obtain release of information): No     Vitals: There were no vitals taken for this visit.    Referring Provider:  ESTABLISHED PATIENT  No address on file      Sarah Adam LPN

## 2017-06-14 LAB — COPATH REPORT: NORMAL

## 2017-07-11 NOTE — PROGRESS NOTES
SUBJECTIVE:   CC: Ivania Gomez is an 42 year old woman who presents for preventive health visit.     Healthy Habits:    Do you get at least three servings of calcium containing foods daily (dairy, green leafy vegetables, etc.)? yes    Amount of exercise or daily activities, outside of work: 2-3 day(s) per week    Problems taking medications regularly No    Medication side effects: No    Have you had an eye exam in the past two years? no    Do you see a dentist twice per year? yes  Do you have sleep apnea, excessive snoring or daytime drowsiness?no     for [facility] [employer]. Doing well on citalopram, able to control anxiety more.    No concerns. Has a period once every 8-9 months, normal for her on Junel. Has noticed a bump on her left labia she was concerned about because her mom had vulvar cancer. It hasn't changed.    H/o white coat HTN. Has a BP cuff at home.    Today's PHQ-2 Score:   PHQ-2 ( 1999 Pf[employer]) 7/10/2017 6/20/2016   Q1: Little interest or pleasure in doing things Not at all Not at all   Q2: Feeling down, depressed or hopeless Not at all Not at all   PHQ-2 Score 0 0       Abuse: Current or Past(Physical, Sexual or Emotional)- No  Do you feel safe in your environment - Yes    Social History   Substance Use Topics     Smoking status: Never Smoker     Smokeless tobacco: Never Used      Comment: nonsmoking household     Alcohol use Yes      Comment: occasional     The patient does not drink >3 drinks per day nor >7 drinks per week.    Reviewed orders with patient.  Reviewed health maintenance and updated orders accordingly - Yes    G 2 P 2   No LMP recorded. Patient is not currently having periods (Reason: Birth Control).     Fasting: No   Td:Tdap 2012       Last 3 Pap Results:   PAP (no units)   Date Value   04/11/2014 NIL   09/07/2011 NIL        HPV: neg 2011          Cholesterol:   Lab Results   Component Value Date    CHOL 189 06/02/2015     Lab Results   Component Value Date     HDL 36 06/02/2015     Lab Results   Component Value Date     06/02/2015     Lab Results   Component Value Date    TRIG 206 06/02/2015     Lab Results   Component Value Date    CHOLHDLRATIO 5.2 06/02/2015         MMG: NA  Dexa:  No     Flex/colo: No    Seat Belt: Yes    Sunscreen use: Yes   Calcium Intake: Adeq   Health Care Directive: No  Sexually Active: Yes     Current contraception: oral contraceptives  History of abnormal Pap smear: No  Family history of colon/breast/ovarian cancer: Yes: Mom with vulvar cancer  Regular self breast exam: Yes  History of abnormal mammogram: No -- going to check with insurance about coverage and schedule one this year       Labs reviewed in EPIC  BP Readings from Last 3 Encounters:   07/13/17 (!) 152/96   07/12/16 129/88   06/02/15 128/87    Wt Readings from Last 3 Encounters:   07/13/17 180 lb (81.6 kg)   07/12/16 174 lb (78.9 kg)   06/02/15 168 lb (76.2 kg)                  Patient Active Problem List   Diagnosis     CARDIOVASCULAR SCREENING; LDL GOAL LESS THAN 160     Mild major depression (H)     PCOS (polycystic ovarian syndrome)     S/P laparoscopic cholecystectomy     Acne vulgaris     History of dysplastic nevus     Melanocytic nevus     History of gestational diabetes     Chronic rhinitis     Hypertriglyceridemia     Past Surgical History:   Procedure Laterality Date     BIOPSY      moles removed     CHOLECYSTECTOMY  2011     COLPOSCOPY CERVIX, BIOPSY CERVIX, ENDOCERVICAL CURETTAGE, COMBINED  1994    benign     EYE SURGERY  2009    lasix     EYE SURGERY      Lasik       Social History   Substance Use Topics     Smoking status: Never Smoker     Smokeless tobacco: Never Used      Comment: nonsmoking household     Alcohol use Yes      Comment: occasional     Family History   Problem Relation Age of Onset     CANCER Mother      vulvar sarcoma     Hypertension Mother      Other Cancer Mother      vulvar sarcoma     Thyroid Disease Mother      Prostate Cancer Paternal  Grandfather      Hypertension Maternal Grandmother      CANCER Maternal Grandfather      lung     Hyperlipidemia Brother      Thyroid Disease Sister      Anxiety Disorder Sister      Thyroid Disease Sister          Current Outpatient Prescriptions   Medication Sig Dispense Refill     [facility] ALLERGY RELIEF-D 5-120 MG per 12 hr tablet TAKE ONE TAB BY MOUTH TWICE DAILY 180 tablet 3     cetirizine (ZYRTEC) 10 MG tablet Take 1 tablet (10 mg) by mouth every evening 90 tablet 3     metFORMIN (GLUCOPHAGE) 500 MG tablet Take 1 tablet (500 mg) by mouth 2 times daily (with meals) 180 tablet 1     citalopram (CELEXA) 20 MG tablet Take 1 tablet (20 mg) by mouth daily 90 tablet 1     norethindrone-ethinyl estradiol (JUNEL FE 1/20) 1-20 MG-MCG per tablet Take 1 tablet by mouth daily 84 tablet 3     fluticasone (FLONASE) 50 MCG/ACT nasal spray Spray 1 spray into both nostrils 2 times daily 48 g 3     LORazepam (ATIVAN) 1 MG tablet Take 1 tablet 30 minutes prior to flight and as needed for severe anxiety. 20 tablet 0     multivitamin, therapeutic with minerals (MULTI-VITAMIN) TABS Take 1 tablet by mouth daily         Patient under age 50, mutual decision reflected in health maintenance.      Pertinent mammograms are reviewed under the imaging tab.      Reviewed and updated as needed this visit by clinical staff  Tobacco  Allergies  Meds         Reviewed and updated as needed this visit by Provider            ROS:  C: NEGATIVE for fever, chills, change in weight  INTEGUMENTARY/SKIN: Negative for worrisome moles or skin lesions. Sees a dermatologist.  E: NEGATIVE for vision changes or irritation  ENT: NEGATIVE for ear, mouth and throat problems  R: NEGATIVE for significant cough or SOB  B: NEGATIVE for masses, tenderness or discharge  CV: NEGATIVE for chest pain, palpitations or peripheral edema  GI: NEGATIVE for nausea, abdominal pain, heartburn, or change in bowel habits  : NEGATIVE for unusual urinary or vaginal symptoms. Periods  "are regular.  M: NEGATIVE for significant arthralgias or myalgia  N: NEGATIVE for weakness, dizziness or paresthesias  E: NEGATIVE for temperature intolerance, skin/hair changes  P: NEGATIVE for changes in mood or affect    OBJECTIVE:   BP (!) 152/96  Pulse 119  Temp 98.2  F (36.8  C) (Oral)  Ht 5' 4.17\" (1.63 m)  Wt 180 lb (81.6 kg)  BMI 30.73 kg/m2  EXAM:  GENERAL: healthy, alert and no distress  EYES: Eyes grossly normal to inspection, PERRL and conjunctivae and sclerae normal  HENT: ear canals and TM's normal, nose and mouth without ulcers or lesions  NECK: no adenopathy, no asymmetry, masses, or scars and thyroid normal to palpation  RESP: lungs clear to auscultation - no rales, rhonchi or wheezes  BREAST: normal without masses, tenderness or nipple discharge and no palpable axillary masses or adenopathy  CV: regular rate and rhythm, normal S1 S2, no S3 or S4, no murmur, click or rub, no peripheral edema and peripheral pulses strong  ABDOMEN: soft, nontender, no hepatosplenomegaly, no masses and bowel sounds normal   (female): normal female external genitalia with 3-4 small white bumps consistent with plugged glands, normal urethral meatus, vaginal mucosa pink, moist, well rugated, and normal cervix/adnexa/uterus without masses or discharge  MS: no gross musculoskeletal defects noted, no edema  SKIN: no suspicious lesions or rashes, patient has multiple white scars were previous moles were removed by dermatology  NEURO: Normal strength and tone, mentation intact and speech normal  PSYCH: mentation appears normal, affect normal/bright    ASSESSMENT/PLAN:     (Z00.00) Routine general medical examination at a health care facility  (primary encounter diagnosis)  Comment: Preventive screening reviewed. Patient due for pap.  Plan: Pap imaged thin layer screen with HPV -         recommended age 30 - 65, HPV High Risk Types         DNA Cervical        Patient will be notified of pap results. Continued to " encourage sunscreen and a healthy lifestyle.    (E28.2) PCOS (polycystic ovarian syndrome)  Comment: Doing well on metformin. Due for Cr check.   Plan: Creatinine, metFORMIN (GLUCOPHAGE) 500 MG         tablet        Will notify patient of results. Refill metformin x 6 months.    (F33.0) Major depressive disorder, recurrent episode, mild (H)  Comment: Stable on citalopram and patient is learning other techniques to control worries.  Plan: citalopram (CELEXA) 20 MG tablet       Refill x 6 months. Encouraged her to continue the anxiety reducing techniques.    (J30.2) Seasonal allergic rhinitis, unspecified chronicity, unspecified trigger  Comment: Daily zyrtec helps symptoms. Due for refills.  Plan: cetirizine (ZYRTEC) 10 MG tablet        Refill zyrtec x 1 year.    (Z30.41) Encounter for surveillance of contraceptive pills  Comment: Doing well on Junel without concerns. + Oligomenorrhea.  Plan: norethindrone-ethinyl estradiol (JUNEL FE 1/20)        1-20 MG-MCG per tablet       Refill x 1 year.    (F40.243) Fear of flying  Comment:  Not needing refills at this time. Discussed use of Lorazepam with patient at this visit. She can call for refills when necessary.  Plan: Refill Lorazepam when patient calls.    (R03.0) Elevated blood pressure reading without diagnosis of hypertension  Comment: Patient has h/o white coat HTN. Elevated at recheck today.  Plan: Patient should stop by pharmacy to have her home BP cuff validated for BP checks at home. May have to consider changing her OCP to an IUD or other birth control option if HTN.        COUNSELING:   Reviewed preventive health counseling, as reflected in patient instructions  Special attention given to:        Regular exercise       Healthy diet/nutrition    BP Screening:   Last 3 BP Readings:    BP Readings from Last 3 Encounters:   07/13/17 142/90   07/12/16 129/88   06/02/15 128/87       The following was recommended to the patient:  Re-screen within 4 weeks and  "recommend lifestyle modifications     reports that she has never smoked. She has never used smokeless tobacco.    Estimated body mass index is 30.73 kg/(m^2) as calculated from the following:    Height as of this encounter: 5' 4.17\" (1.63 m).    Weight as of this encounter: 180 lb (81.6 kg).   Weight management plan: Discussed healthy diet and exercise guidelines and patient will follow up in 6 months in clinic to re-evaluate.    Counseling Resources:  ATP IV Guidelines  Pooled Cohorts Equation Calculator  Breast Cancer Risk Calculator  FRAX Risk Assessment  ICSI Preventive Guidelines  Dietary Guidelines for Americans, 2010  USDA's MyPlate  ASA Prophylaxis  Lung CA Screening    Jacquelyn Zaldivar MD  [facility]  "

## 2017-07-13 ENCOUNTER — OFFICE VISIT (OUTPATIENT)
Dept: FAMILY MEDICINE | Facility: CLINIC | Age: 42
End: 2017-07-13
Payer: COMMERCIAL

## 2017-07-13 VITALS
BODY MASS INDEX: 30.73 KG/M2 | WEIGHT: 180 LBS | TEMPERATURE: 98.2 F | DIASTOLIC BLOOD PRESSURE: 90 MMHG | HEART RATE: 119 BPM | HEIGHT: 64 IN | SYSTOLIC BLOOD PRESSURE: 142 MMHG

## 2017-07-13 DIAGNOSIS — Z00.00 ROUTINE GENERAL MEDICAL EXAMINATION AT A HEALTH CARE FACILITY: Primary | ICD-10-CM

## 2017-07-13 DIAGNOSIS — F33.0 MAJOR DEPRESSIVE DISORDER, RECURRENT EPISODE, MILD (H): ICD-10-CM

## 2017-07-13 DIAGNOSIS — E28.2 PCOS (POLYCYSTIC OVARIAN SYNDROME): ICD-10-CM

## 2017-07-13 DIAGNOSIS — Z30.41 ENCOUNTER FOR SURVEILLANCE OF CONTRACEPTIVE PILLS: ICD-10-CM

## 2017-07-13 DIAGNOSIS — F40.243 FEAR OF FLYING: ICD-10-CM

## 2017-07-13 DIAGNOSIS — J30.2 SEASONAL ALLERGIC RHINITIS, UNSPECIFIED CHRONICITY, UNSPECIFIED TRIGGER: ICD-10-CM

## 2017-07-13 DIAGNOSIS — R03.0 ELEVATED BLOOD PRESSURE READING WITHOUT DIAGNOSIS OF HYPERTENSION: ICD-10-CM

## 2017-07-13 LAB
CREAT SERPL-MCNC: 0.72 MG/DL (ref 0.52–1.04)
GFR SERPL CREATININE-BSD FRML MDRD: 89 ML/MIN/1.7M2

## 2017-07-13 PROCEDURE — 36415 COLL VENOUS BLD VENIPUNCTURE: CPT | Performed by: FAMILY MEDICINE

## 2017-07-13 PROCEDURE — 87624 HPV HI-RISK TYP POOLED RSLT: CPT | Performed by: FAMILY MEDICINE

## 2017-07-13 PROCEDURE — G0145 SCR C/V CYTO,THINLAYER,RESCR: HCPCS | Performed by: FAMILY MEDICINE

## 2017-07-13 PROCEDURE — 82565 ASSAY OF CREATININE: CPT | Performed by: FAMILY MEDICINE

## 2017-07-13 PROCEDURE — 99396 PREV VISIT EST AGE 40-64: CPT | Performed by: FAMILY MEDICINE

## 2017-07-13 RX ORDER — CETIRIZINE HYDROCHLORIDE 10 MG/1
10 TABLET ORAL EVERY EVENING
Qty: 90 TABLET | Refills: 3 | Status: SHIPPED | OUTPATIENT
Start: 2017-07-13 | End: 2018-09-13

## 2017-07-13 RX ORDER — NORETHINDRONE ACETATE AND ETHINYL ESTRADIOL 1MG-20(21)
1 KIT ORAL DAILY
Qty: 84 TABLET | Refills: 3 | Status: SHIPPED | OUTPATIENT
Start: 2017-07-13 | End: 2018-05-16

## 2017-07-13 RX ORDER — CITALOPRAM HYDROBROMIDE 20 MG/1
20 TABLET ORAL DAILY
Qty: 90 TABLET | Refills: 1 | Status: SHIPPED | OUTPATIENT
Start: 2017-07-13 | End: 2018-09-13

## 2017-07-13 RX ORDER — LORAZEPAM 1 MG/1
TABLET ORAL
Qty: 20 TABLET | Refills: 0 | Status: CANCELLED | OUTPATIENT
Start: 2017-07-13

## 2017-07-13 ASSESSMENT — ANXIETY QUESTIONNAIRES
6. BECOMING EASILY ANNOYED OR IRRITABLE: NOT AT ALL
GAD7 TOTAL SCORE: 0
7. FEELING AFRAID AS IF SOMETHING AWFUL MIGHT HAPPEN: NOT AT ALL
IF YOU CHECKED OFF ANY PROBLEMS ON THIS QUESTIONNAIRE, HOW DIFFICULT HAVE THESE PROBLEMS MADE IT FOR YOU TO DO YOUR WORK, TAKE CARE OF THINGS AT HOME, OR GET ALONG WITH OTHER PEOPLE: NOT DIFFICULT AT ALL
5. BEING SO RESTLESS THAT IT IS HARD TO SIT STILL: NOT AT ALL
3. WORRYING TOO MUCH ABOUT DIFFERENT THINGS: NOT AT ALL
1. FEELING NERVOUS, ANXIOUS, OR ON EDGE: NOT AT ALL
2. NOT BEING ABLE TO STOP OR CONTROL WORRYING: NOT AT ALL

## 2017-07-13 ASSESSMENT — PATIENT HEALTH QUESTIONNAIRE - PHQ9: 5. POOR APPETITE OR OVEREATING: NOT AT ALL

## 2017-07-13 NOTE — LETTER
My Depression Action Plan  Name: Ivania Gomez   Date of Birth 1975  Date: 7/11/2017    My doctor: Jacquelyn Zaldivar   My clinic: [facility]  [address] [place] [id number] [phone]          GREEN    ZONE   Good Control    What it looks like:     Things are going generally well. You have normal up s and down s. You may even feel depressed from time to time, but bad moods usually last less than a day.   What you need to do:  1. Continue to care for yourself (see self care plan)  2. Check your depression survival kit and update it as needed  3. Follow your physician s recommendations including any medication.  4. Do not stop taking medication unless you consult with your physician first.           YELLOW         ZONE Getting Worse    What it looks like:     Depression is starting to interfere with your life.     It may be hard to get out of bed; you may be starting to isolate yourself from others.    Symptoms of depression are starting to last most all day and this has happened for several days.     You may have suicidal thoughts but they are not constant.   What you need to do:     1. Call your care team, your response to treatment will improve if you keep your care team informed of your progress. Yellow periods are signs an adjustment may need to be made.     2. Continue your self-care, even if you have to fake it!    3. Talk to someone in your support network    4. Open up your depression survival kit           RED    ZONE Medical Alert - Get Help    What it looks like:     Depression is seriously interfering with your life.     You may experience these or other symptoms: You can t get out of bed most days, can t work or engage in other necessary activities, you have trouble taking care of basic hygiene, or basic responsibilities, thoughts of suicide or death that will not go away, self-injurious behavior.     What you need to do:  1. Call your care team and  request a same-day appointment. If they are not available (weekends or after hours) call your local crisis line, emergency room or 911.      Electronically signed by: Lizz Rogers, July 11, 2017    Depression Self Care Plan / Survival Kit    Self-Care for Depression  Here s the deal. Your body and mind are really not as separate as most people think.  What you do and think affects how you feel and how you feel influences what you do and think. This means if you do things that people who feel good do, it will help you feel better.  Sometimes this is all it takes.  There is also a place for medication and therapy depending on how severe your depression is, so be sure to consult with your medical provider and/ or Behavioral Health Consultant if your symptoms are worsening or not improving.     In order to better manage my stress, I will:    Exercise  Get some form of exercise, every day. This will help reduce pain and release endorphins, the  feel good  chemicals in your brain. This is almost as good as taking antidepressants!  This is not the same as joining a gym and then never going! (they count on that by the way ) It can be as simple as just going for a walk or doing some gardening, anything that will get you moving.      Hygiene   Maintain good hygiene (Get out of bed in the morning, Make your bed, Brush your teeth, Take a shower, and Get dressed like you were going to work, even if you are unemployed).  If your clothes don't fit try to get ones that do.    Diet  I will strive to eat foods that are good for me, drink plenty of water, and avoid excessive sugar, caffeine, alcohol, and other mood-altering substances.  Some foods that are helpful in depression are: complex carbohydrates, B vitamins, flaxseed, fish or fish oil, fresh fruits and vegetables.    Psychotherapy  I agree to participate in Individual Therapy (if recommended).    Medication  If prescribed medications, I agree to take them.  Missing  doses can result in serious side effects.  I understand that drinking alcohol, or other illicit drug use, may cause potential side effects.  I will not stop my medication abruptly without first discussing it with my provider.    Staying Connected With Others  I will stay in touch with my friends, family members, and my primary care provider/team.    Use your imagination  Be creative.  We all have a creative side; it doesn t matter if it s oil painting, sand castles, or mud pies! This will also kick up the endorphins.    Witness Beauty  (AKA stop and smell the roses) Take a look outside, even in mid-winter. Notice colors, textures. Watch the squirrels and birds.     Service to others  Be of service to others.  There is always someone else in need.  By helping others we can  get out of ourselves  and remember the really important things.  This also provides opportunities for practicing all the other parts of the program.    Humor  Laugh and be silly!  Adjust your TV habits for less news and crime-drama and more comedy.    Control your stress  Try breathing deep, massage therapy, biofeedback, and meditation. Find time to relax each day.     My support system    Clinic Contact:  Phone number:    Contact 1:  Phone number:    Contact 2:  Phone number:    Mormon/:  Phone number:    Therapist:  Phone number:    Local crisis center:    Phone number:    Other community support:  Phone number:

## 2017-07-13 NOTE — NURSING NOTE
"Chief Complaint   Patient presents with     Physical       Initial BP (!) 152/96  Pulse 119  Temp 98.2  F (36.8  C) (Oral)  Ht 5' 4.17\" (1.63 m)  Wt 180 lb (81.6 kg)  BMI 30.73 kg/m2 Estimated body mass index is 30.73 kg/(m^2) as calculated from the following:    Height as of this encounter: 5' 4.17\" (1.63 m).    Weight as of this encounter: 180 lb (81.6 kg).  Medication Reconciliation: complete  Lizz Rachel , [facility]     "

## 2017-07-13 NOTE — MR AVS SNAPSHOT
After Visit Summary   7/13/2017    Ivania Gomez    MRN: [id number]           Patient Information     Date Of Birth          1975        Visit Information        Provider Department      7/13/2017 11:35 AM Jacquelyn Zaldivar MD [facility]        Today's Diagnoses     Routine general medical examination at a health care facility    -  1    PCOS (polycystic ovarian syndrome)        Major depressive disorder, recurrent episode, mild (H)        Seasonal allergic rhinitis, unspecified chronicity, unspecified trigger        Encounter for surveillance of contraceptive pills        Fear of flying          Care Instructions      Preventive Health Recommendations  Female Ages 40 to 49    Yearly exam:     See your health care provider every year in order to  1. Review health changes.   2. Discuss preventive care.    3. Review your medicines if your doctor prescribed any.      Get a Pap test every three years (unless you have an abnormal result and your provider advises testing more often).      If you get Pap tests with HPV test, you only need to test every 5 years, unless you have an abnormal result. You do not need a Pap test if your uterus was removed (hysterectomy) and you have not had cancer.      You should be tested each year for STDs (sexually transmitted diseases), if you're at risk.       Ask your doctor if you should have a mammogram.      Have a colonoscopy (test for colon cancer) if someone in your family has had colon cancer or polyps before age 50.       Have a cholesterol test every 5 years.       Have a diabetes test (fasting glucose) after age 45. If you are at risk for diabetes, you should have this test every 3 years.    Shots: Get a flu shot each year. Get a tetanus shot every 10 years.     Nutrition:     Eat at least 5 servings of fruits and vegetables each day.    Eat whole-grain bread, whole-wheat pasta and brown rice instead of white grains and rice.    Talk to your  provider about Calcium and Vitamin D.     Lifestyle    Exercise at least 150 minutes a week (an average of 30 minutes a day, 5 days a week). This will help you control your weight and prevent disease.    Limit alcohol to one drink per day.    No smoking.     Wear sunscreen to prevent skin cancer.    See your dentist every six months for an exam and cleaning.          Follow-ups after your visit        Your next 10 appointments already scheduled     Jun 05, 2018 11:00 AM CDT   Return Visit with Charlee Reaves MD   [facility] ([facility])    [address] [place] [id number] [phone]              Who to contact     If you have questions or need follow up information about today's clinic visit or your schedule please contact [facility] directly at [phone].  Normal or non-critical lab and imaging results will be communicated to you by MyChart, letter or phone within 4 business days after the clinic has received the results. If you do not hear from us within 7 days, please contact the clinic through [employer] or phone. If you have a critical or abnormal lab result, we will notify you by phone as soon as possible.  Submit refill requests through [facility] ([facility]) or call your pharmacy and they will forward the refill request to us. Please allow 3 business days for your refill to be completed.          Additional Information About Your Visit        [facility] Information     [facility] gives you secure access to your electronic health record. If you see a primary care provider, you can also send messages to your care team and make appointments. If you have questions, please call your primary care clinic.  If you do not have a primary care provider, please call [phone] and they will assist you.        Care EveryWhere ID     This is your Care EveryWhere ID. This could be used by other organizations to access your [facility] medical records  [id number]        Your  "Vitals Were     Pulse Temperature Height BMI (Body Mass Index)          119 98.2  F (36.8  C) (Oral) 5' 4.17\" (1.63 m) 30.73 kg/m2         Blood Pressure from Last 3 Encounters:   07/13/17 142/90   07/12/16 129/88   06/02/15 128/87    Weight from Last 3 Encounters:   07/13/17 180 lb (81.6 kg)   07/12/16 174 lb (78.9 kg)   06/02/15 168 lb (76.2 kg)              We Performed the Following     Creatinine     DEPRESSION ACTION PLAN (DAP)     HPV High Risk Types DNA Cervical     Pap imaged thin layer screen with HPV - recommended age 30 - 65          Where to get your medicines      These medications were sent to [place] [address] [place], [place] - [address] [facility]  [address] [facility] [address]     Phone:  [phone]     cetirizine 10 MG tablet    citalopram 20 MG tablet    metFORMIN 500 MG tablet    norethindrone-ethinyl estradiol 1-20 MG-MCG per tablet          Primary Care Provider Office Phone # Fax #    Jacquelyn Zaldivar [phone]       [facility] [address] [facility] [address]        Equal Access to Services     SHELBIE GALVEZ AH: Hadii levi saenz hadasho Soomaali, waaxda luqadaha, qaybta kaalmada adeegyada, waxay estrellain hayprasad urena. So [facility] [phone].    ATENCIÓN: Si habla español, tiene a montez disposición servicios gratuitos de asistencia lingüística. Llame al [phone].    We comply with applicable federal civil rights laws and [place] laws. We do not discriminate on the basis of race, color, national origin, age, disability sex, sexual orientation or gender identity.            Thank you!     Thank you for choosing [facility]  for your care. Our goal is always to provide you with excellent care. Hearing back from our patients is one way we can continue to improve our services. Please take a few minutes to complete the written survey that you may receive in the mail after your visit with us. Thank you!           "   Your Updated Medication List - Protect others around you: Learn how to safely use, store and throw away your medicines at www.disposemymeds.org.          This list is accurate as of: 7/13/17 12:22 PM.  Always use your most recent med list.                   Brand Name Dispense Instructions for use Diagnosis    cetirizine 10 MG tablet    zyrTEC    90 tablet    Take 1 tablet (10 mg) by mouth every evening    Seasonal allergic rhinitis, unspecified chronicity, unspecified trigger       citalopram 20 MG tablet    celeXA    90 tablet    Take 1 tablet (20 mg) by mouth daily    Major depressive disorder, recurrent episode, mild (H)       CVS ALLERGY RELIEF-D 5-120 MG per 12 hr tablet   Generic drug:  cetirizine-psuedoePHEDrine     180 tablet    TAKE ONE TAB BY MOUTH TWICE DAILY    Chronic rhinitis       fluticasone 50 MCG/ACT spray    FLONASE    48 g    Spray 1 spray into both nostrils 2 times daily    Chronic rhinitis       LORazepam 1 MG tablet    ATIVAN    20 tablet    Take 1 tablet 30 minutes prior to flight and as needed for severe anxiety.    Fear of flying       metFORMIN 500 MG tablet    GLUCOPHAGE    180 tablet    Take 1 tablet (500 mg) by mouth 2 times daily (with meals)    PCOS (polycystic ovarian syndrome)       Multi-vitamin Tabs tablet      Take 1 tablet by mouth daily        norethindrone-ethinyl estradiol 1-20 MG-MCG per tablet    JUNEL FE 1/20    84 tablet    Take 1 tablet by mouth daily    Encounter for surveillance of contraceptive pills

## 2017-07-14 ASSESSMENT — PATIENT HEALTH QUESTIONNAIRE - PHQ9: SUM OF ALL RESPONSES TO PHQ QUESTIONS 1-9: 0

## 2017-07-14 ASSESSMENT — ANXIETY QUESTIONNAIRES: GAD7 TOTAL SCORE: 0

## 2017-07-15 DIAGNOSIS — F33.0 MAJOR DEPRESSIVE DISORDER, RECURRENT EPISODE, MILD (H): ICD-10-CM

## 2017-07-18 LAB
COPATH REPORT: NORMAL
PAP: NORMAL

## 2017-07-19 RX ORDER — CITALOPRAM HYDROBROMIDE 20 MG/1
TABLET ORAL
Qty: 90 TABLET | Refills: 1 | Status: SHIPPED | OUTPATIENT
Start: 2017-07-19 | End: 2018-02-03

## 2017-07-20 LAB
FINAL DIAGNOSIS: NORMAL
HPV HR 12 DNA CVX QL NAA+PROBE: NEGATIVE
HPV16 DNA SPEC QL NAA+PROBE: NEGATIVE
HPV18 DNA SPEC QL NAA+PROBE: NEGATIVE
SPECIMEN DESCRIPTION: NORMAL

## 2017-08-03 DIAGNOSIS — E28.2 PCOS (POLYCYSTIC OVARIAN SYNDROME): ICD-10-CM

## 2017-08-10 DIAGNOSIS — Z30.41 ENCOUNTER FOR SURVEILLANCE OF CONTRACEPTIVE PILLS: ICD-10-CM

## 2017-08-10 RX ORDER — NORETHINDRONE ACETATE AND ETHINYL ESTRADIOL 1MG-20(21)
KIT ORAL
Qty: 84 TABLET | Refills: 3 | OUTPATIENT
Start: 2017-08-10

## 2017-08-12 DIAGNOSIS — J30.2 CHRONIC SEASONAL ALLERGIC RHINITIS, UNSPECIFIED TRIGGER: ICD-10-CM

## 2017-08-14 RX ORDER — CETIRIZINE HCL 10 MG
TABLET ORAL
Qty: 180 TABLET | Refills: 3 | Status: SHIPPED | OUTPATIENT
Start: 2017-08-14 | End: 2018-04-02

## 2018-01-23 ENCOUNTER — TELEPHONE (OUTPATIENT)
Dept: FAMILY MEDICINE | Facility: CLINIC | Age: 43
End: 2018-01-23

## 2018-01-23 DIAGNOSIS — E28.2 PCOS (POLYCYSTIC OVARIAN SYNDROME): ICD-10-CM

## 2018-01-24 NOTE — TELEPHONE ENCOUNTER
Patient was instructed in July that because her BP was elevated at OV, to have it rechecked in the pharmacy with her machine.   Patient didn't this.     No appointment pending at this time.  Routing to provider to advise.    Qi Vergara RN

## 2018-02-03 DIAGNOSIS — F33.0 MAJOR DEPRESSIVE DISORDER, RECURRENT EPISODE, MILD (H): ICD-10-CM

## 2018-02-03 NOTE — LETTER
February 14, 2018    Ivania Gomez  [address] [facility] [address]      Dear Ivania,       After reviewing your chart, I have determined you are due for a PHQ-9 questionnaire. The PHQ-9 is a nine question survey tool that helps us determine how your depression is doing based on the last two weeks. Please complete the questionnaire and return in the envelope provided.          Thank you,   [facility]  [phone]

## 2018-02-05 ENCOUNTER — MYC MEDICAL ADVICE (OUTPATIENT)
Dept: NURSING | Facility: CLINIC | Age: 43
End: 2018-02-05

## 2018-02-06 NOTE — TELEPHONE ENCOUNTER
Left message on answering machine for patient/parent to call back.   [phone].  Elaine Rey RN

## 2018-02-07 ENCOUNTER — ALLIED HEALTH/NURSE VISIT (OUTPATIENT)
Dept: FAMILY MEDICINE | Facility: CLINIC | Age: 43
End: 2018-02-07
Payer: COMMERCIAL

## 2018-02-07 VITALS — DIASTOLIC BLOOD PRESSURE: 84 MMHG | SYSTOLIC BLOOD PRESSURE: 130 MMHG | HEART RATE: 104 BPM

## 2018-02-07 DIAGNOSIS — Z01.30 BLOOD PRESSURE CHECK: Primary | ICD-10-CM

## 2018-02-07 PROCEDURE — 99207 ZZC NO CHARGE NURSE ONLY: CPT | Performed by: FAMILY MEDICINE

## 2018-02-07 RX ORDER — CITALOPRAM HYDROBROMIDE 20 MG/1
TABLET ORAL
Qty: 30 TABLET | Refills: 0 | Status: SHIPPED | OUTPATIENT
Start: 2018-02-07 | End: 2018-02-07

## 2018-02-07 RX ORDER — CITALOPRAM HYDROBROMIDE 20 MG/1
TABLET ORAL
Qty: 90 TABLET | Refills: 1 | Status: SHIPPED | OUTPATIENT
Start: 2018-02-07 | End: 2018-04-02

## 2018-02-07 ASSESSMENT — PATIENT HEALTH QUESTIONNAIRE - PHQ9
SUM OF ALL RESPONSES TO PHQ QUESTIONS 1-9: 0
10. IF YOU CHECKED OFF ANY PROBLEMS, HOW DIFFICULT HAVE THESE PROBLEMS MADE IT FOR YOU TO DO YOUR WORK, TAKE CARE OF THINGS AT HOME, OR GET ALONG WITH OTHER PEOPLE: NOT DIFFICULT AT ALL
SUM OF ALL RESPONSES TO PHQ QUESTIONS 1-9: 0

## 2018-02-07 NOTE — MR AVS SNAPSHOT
After Visit Summary   2/7/2018    Ivania Gomez    MRN: [id number]           Patient Information     Date Of Birth          1975        Visit Information        Provider Department      2/7/2018 2:01 PM Jacquelyn Zaldivar MD [facility]        Today's Diagnoses     Blood pressure check    -  1       Follow-ups after your visit        Your next 10 appointments already scheduled     Jun 19, 2018 11:00 AM CDT   Return Visit with Charlee Raeves MD   [facility]    [address] [place] [address] [phone]              Who to contact     If you have questions or need follow up information about today's clinic visit or your schedule please contact [facility] directly at [phone].  Normal or non-critical lab and imaging results will be communicated to you by MyChart, letter or phone within 4 business days after the clinic has received the results. If you do not hear from us within 7 days, please contact the clinic through MyChart or phone. If you have a critical or abnormal lab result, we will notify you by phone as soon as possible.  Submit refill requests through TicketBiscuit or call your pharmacy and they will forward the refill request to us. Please allow 3 business days for your refill to be completed.          Additional Information About Your Visit        MyChart Information     TicketBiscuit gives you secure access to your electronic health record. If you see a primary care provider, you can also send messages to your care team and make appointments. If you have questions, please call your primary care clinic.  If you do not have a primary care provider, please call [phone] and they will assist you.        Care EveryWhere ID     This is your Care EveryWhere ID. This could be used by other organizations to access your [facility] medical records  [id number]        Your Vitals Were     Pulse                    104            Blood Pressure from Last 3 Encounters:   02/07/18 130/84   07/13/17 142/90   07/12/16 129/88    Weight from Last 3 Encounters:   07/13/17 180 lb (81.6 kg)   07/12/16 174 lb (78.9 kg)   06/02/15 168 lb (76.2 kg)              Today, you had the following     No orders found for display       Primary Care Provider Office Phone # Fax #    Jacquelyn Elvia Zaldivar [phone] [address] [facility] [address]        Equal Access to Services     [facility]: Hadii aad ku hadasho Soomaali, waaxda luqadaha, qaybta kaalmada adeegyada, lynn dunn . So [facility] [phone].    ATENCIÓN: Si habla español, tiene a montez disposición servicios gratuitos de asistencia lingüística. [facility] [phone].    We comply with applicable federal civil rights laws and [place] laws. We do not discriminate on the basis of race, color, national origin, age, disability, sex, sexual orientation, or gender identity.            Thank you!     Thank you for choosing [facility]  for your care. Our goal is always to provide you with excellent care. Hearing back from our patients is one way we can continue to improve our services. Please take a few minutes to complete the written survey that you may receive in the mail after your visit with us. Thank you!             Your Updated Medication List - Protect others around you: Learn how to safely use, store and throw away your medicines at www.disposemymeds.org.          This list is accurate as of 2/7/18  2:09 PM.  Always use your most recent med list.                   Brand Name Dispense Instructions for use Diagnosis    cetirizine 10 MG tablet    zyrTEC    90 tablet    Take 1 tablet (10 mg) by mouth every evening    Seasonal allergic rhinitis, unspecified chronicity, unspecified trigger       * citalopram 20 MG tablet    celeXA    90 tablet    Take 1 tablet (20 mg) by mouth daily    Major depressive disorder,  recurrent episode, mild (H)       * citalopram 20 MG tablet    celeXA    90 tablet    TAKE 1 TABLET (20 MG) BY MOUTH DAILY    Major depressive disorder, recurrent episode, mild (H)       * [facility] ALLERGY RELIEF-D 5-120 MG per 12 hr tablet   Generic drug:  cetirizine-psuedoePHEDrine     180 tablet    TAKE ONE TAB BY MOUTH TWICE DAILY    Chronic rhinitis       * [facility] ALLERGY RELIEF-D 5-120 MG per 12 hr tablet   Generic drug:  cetirizine-psuedoePHEDrine     180 tablet    TAKE ONE TAB BY MOUTH TWICE DAILY    Chronic seasonal allergic rhinitis, unspecified trigger       fluticasone 50 MCG/ACT spray    FLONASE    48 g    Spray 1 spray into both nostrils 2 times daily    Chronic rhinitis       LORazepam 1 MG tablet    ATIVAN    20 tablet    Take 1 tablet 30 minutes prior to flight and as needed for severe anxiety.    Fear of flying       metFORMIN 500 MG tablet    GLUCOPHAGE    180 tablet    Take 1 tablet (500 mg) by mouth 2 times daily (with meals)    PCOS (polycystic ovarian syndrome)       Multi-vitamin Tabs tablet      Take 1 tablet by mouth daily        norethindrone-ethinyl estradiol 1-20 MG-MCG per tablet    JUNEL FE 1/20    84 tablet    Take 1 tablet by mouth daily    Encounter for surveillance of contraceptive pills       * Notice:  This list has 4 medication(s) that are the same as other medications prescribed for you. Read the directions carefully, and ask your doctor or other care provider to review them with you.

## 2018-02-07 NOTE — Clinical Note
Routing message to PCP for review -BP checked at pharmacy and noted to be at goal - 130/84, pulse 104.  Patient was instructed to have pharmacy send results to PCP.    Sanjay Edwards RP. [facility] [phone]

## 2018-02-07 NOTE — TELEPHONE ENCOUNTER
Pt needs to update PHQ-9 by calling clinic, stopping at  or pharmacy or by completing in Tinkt for further refills.  Glenda Santillan RN

## 2018-02-07 NOTE — PROGRESS NOTES
Ivania Gomez is enrolled/participating in the retail pharmacy Blood Pressure Goals Achievement Program (BPGAP).  Ivania Gomez was evaluated at [facility] on February 7, 2018 at which time her blood pressure was:    BP Readings from Last 3 Encounters:   02/07/18 130/84   07/13/17 142/90   07/12/16 129/88     Reviewed lifestyle modifications for blood pressure control and reduction: including making healthy food choices, managing weight, getting regular exercise, smoking cessation, reducing alcohol consumption, monitoring blood pressure regularly.     Ivania Gomez is not experiencing symptoms.    Follow-Up: BP is at goal of < 140/90mmHg (patient 18+ years of age with or without diabetes).  Recommended follow-up at pharmacy in 6 months.     Recommendation to Provider: None    Ivania Gomez was evaluated for enrollment into the PGEN study today.    Patient eligible for enrollment:  No  Patient interested in enrollment:  No    Completed by: Sanjay Edwards RPh.  [facility]  [phone]

## 2018-02-08 ASSESSMENT — PATIENT HEALTH QUESTIONNAIRE - PHQ9: SUM OF ALL RESPONSES TO PHQ QUESTIONS 1-9: 0

## 2018-03-23 ENCOUNTER — E-VISIT (OUTPATIENT)
Dept: FAMILY MEDICINE | Facility: CLINIC | Age: 43
End: 2018-03-23
Payer: COMMERCIAL

## 2018-03-23 DIAGNOSIS — N92.0 EXCESSIVE OR FREQUENT MENSTRUATION: Primary | ICD-10-CM

## 2018-03-23 PROCEDURE — 99444 ZZC PHYSICIAN ONLINE EVALUATION & MANAGEMENT SERVICE: CPT | Performed by: FAMILY MEDICINE

## 2018-03-26 DIAGNOSIS — N92.0 EXCESSIVE OR FREQUENT MENSTRUATION: ICD-10-CM

## 2018-03-26 LAB
ERYTHROCYTE [DISTWIDTH] IN BLOOD BY AUTOMATED COUNT: 14.5 % (ref 10–15)
FSH SERPL-ACNC: 7.1 IU/L
HCT VFR BLD AUTO: 34.1 % (ref 35–47)
HGB BLD-MCNC: 11 G/DL (ref 11.7–15.7)
LH SERPL-ACNC: 6.8 IU/L
MCH RBC QN AUTO: 28.1 PG (ref 26.5–33)
MCHC RBC AUTO-ENTMCNC: 32.3 G/DL (ref 31.5–36.5)
MCV RBC AUTO: 87 FL (ref 78–100)
PLATELET # BLD AUTO: 462 10E9/L (ref 150–450)
PROLACTIN SERPL-MCNC: 19 UG/L (ref 3–27)
RBC # BLD AUTO: 3.92 10E12/L (ref 3.8–5.2)
TSH SERPL DL<=0.005 MIU/L-ACNC: 1.75 MU/L (ref 0.4–4)
WBC # BLD AUTO: 7.1 10E9/L (ref 4–11)

## 2018-03-26 PROCEDURE — 85027 COMPLETE CBC AUTOMATED: CPT | Performed by: FAMILY MEDICINE

## 2018-03-26 PROCEDURE — 84146 ASSAY OF PROLACTIN: CPT | Performed by: FAMILY MEDICINE

## 2018-03-26 PROCEDURE — 83002 ASSAY OF GONADOTROPIN (LH): CPT | Performed by: FAMILY MEDICINE

## 2018-03-26 PROCEDURE — 36415 COLL VENOUS BLD VENIPUNCTURE: CPT | Performed by: FAMILY MEDICINE

## 2018-03-26 PROCEDURE — 84443 ASSAY THYROID STIM HORMONE: CPT | Performed by: FAMILY MEDICINE

## 2018-03-26 PROCEDURE — 83001 ASSAY OF GONADOTROPIN (FSH): CPT | Performed by: FAMILY MEDICINE

## 2018-03-29 ENCOUNTER — RADIANT APPOINTMENT (OUTPATIENT)
Dept: ULTRASOUND IMAGING | Facility: CLINIC | Age: 43
End: 2018-03-29
Attending: FAMILY MEDICINE
Payer: COMMERCIAL

## 2018-03-29 DIAGNOSIS — N93.8 DUB (DYSFUNCTIONAL UTERINE BLEEDING): ICD-10-CM

## 2018-03-29 PROCEDURE — 76830 TRANSVAGINAL US NON-OB: CPT

## 2018-03-29 PROCEDURE — 76856 US EXAM PELVIC COMPLETE: CPT

## 2018-04-02 ENCOUNTER — DOCUMENTATION ONLY (OUTPATIENT)
Dept: LAB | Facility: CLINIC | Age: 43
End: 2018-04-02

## 2018-04-02 ENCOUNTER — OFFICE VISIT (OUTPATIENT)
Dept: OBGYN | Facility: CLINIC | Age: 43
End: 2018-04-02
Payer: COMMERCIAL

## 2018-04-02 VITALS
HEART RATE: 113 BPM | OXYGEN SATURATION: 99 % | SYSTOLIC BLOOD PRESSURE: 141 MMHG | WEIGHT: 183.1 LBS | DIASTOLIC BLOOD PRESSURE: 94 MMHG | BODY MASS INDEX: 31.26 KG/M2

## 2018-04-02 DIAGNOSIS — Z32.00 PREGNANCY EXAMINATION OR TEST, PREGNANCY UNCONFIRMED: Primary | ICD-10-CM

## 2018-04-02 DIAGNOSIS — N92.0 MENORRHAGIA WITH REGULAR CYCLE: ICD-10-CM

## 2018-04-02 DIAGNOSIS — D25.0 SUBMUCOUS LEIOMYOMA OF UTERUS: ICD-10-CM

## 2018-04-02 LAB — BETA HCG QUAL IFA URINE: ABNORMAL

## 2018-04-02 PROCEDURE — 58100 BIOPSY OF UTERUS LINING: CPT | Performed by: OBSTETRICS & GYNECOLOGY

## 2018-04-02 PROCEDURE — 99202 OFFICE O/P NEW SF 15 MIN: CPT | Mod: 25 | Performed by: OBSTETRICS & GYNECOLOGY

## 2018-04-02 PROCEDURE — 88305 TISSUE EXAM BY PATHOLOGIST: CPT | Performed by: OBSTETRICS & GYNECOLOGY

## 2018-04-02 NOTE — PROGRESS NOTES
This 41 y/o female, , LMP 3/11/18, presents as a new patient to the [place] gyn dept c/o menorrhagia which has worsened this year.  She states that for the past several years she would get maybe one menses per year and has been taking the BCP for contraception.  She is s/p  followed by a C/S 6 years ago.  However, while on vacation last month she bled heavily from 3/11 x 10 days but declines a hgb check today since she does not feel more tried than usual and is being treated for anemia.  On her worst day she had to change a pad/tampon every 20 minutes.  She had a recent US which demonstrated an enlarged uterus measuring 10.4 x 5 x 6.1 cm with a 3.2 x 2.8 x 3.4 cm submucosal fibroid and an 0.8 mm endometrial stripe after the heavy menses.  She has not had a tissue sampling performed yet and does have a hx of PCOS.  BP (!) 141/94  Pulse 113  Wt 183 lb 1.6 oz (83.1 kg)  LMP 2018  SpO2 99%  Breastfeeding? No  BMI 31.26 kg/m2  She denies any concern for pregnancy since she has been abstinent and declines a UPT.  Informed consent was reviewed and obtained for the endometrial biopsy and sterile technique was used.  A bi-valve speculum was placed and her cervix was cleansed with betadine x 3 swabs.  The 12 o'clock position was grasped with a single-toothed tenaculum and an OsFinder was used to dilate her internal os without difficulty.  The pipelle was then inserted and 2 swipes of the endometrium were performed and all tissue was submitted to pathology.  She tolerated the procedure well and there were no complications.  All instruments were removed and f/u care and directions were reviewed with the pt.  Her uterus sounded to 9.5 cm and was anteverted in position.  Assessment - menorrhagia felt to be due to leiomyoma  Plan - Submit the endometrial biopsy and treat accordingly.  If benign, then the plan is to check a UPT and if negative, then initiate Lupron depot therapy x 6 months IM.  30 days after  the 6th and final Lupron injection, then check a pelvic MRI and refer to IR for a possible UAE.  She is not interested in future childbearing but is hoping to avoid a hysterectomy for treatment.  This was a 30-minute visit and over 50% of the time was spent in direct pt consultation and 10 minutes in procedure.

## 2018-04-02 NOTE — PATIENT INSTRUCTIONS
If you have any questions regarding your visit, Please contact your care team.    Women s Health CLINIC HOURS TELEPHONE NUMBER   Codie Ponce DO.    SRIKANTH Hirsch -    URVASHI Ayala       Monday, Wednesday, Thursday and Friday, [place]  8:30a.m-5:00 p.m   [facility]  [address] [place] [address]  [phone] ask for [facility]    Imaging Jbmhvresix-[phone]       Urgent Care locations:    [facility] Saturday and Sunday   9 am - 5 pm    Monday-Friday   12 pm - 8 pm  Saturday and Sunday   9 am - 5 pm   [phone]     [facility] Labor and Delivery:  [phone]    If you need a medication refill, please contact your pharmacy. Please allow 3 business days for your refill to be completed.  As always, Thank you for trusting us with your healthcare needs!

## 2018-04-02 NOTE — MR AVS SNAPSHOT
After Visit Summary   4/2/2018    Ivania Gomez    MRN: [id number]           Patient Information     Date Of Birth          1975        Visit Information        Provider Department      4/2/2018 3:30 PM Codie Ponce DO [facility]        Care Instructions                                                         If you have any questions regarding your visit, Please contact your care team.    Women s Health CLINIC HOURS TELEPHONE NUMBER   Codie DO Rosario.    SRIKANTH Hirsch -    URVASHI Ayala       [facility], Wednesday[facility]  8:30a.m-5:00 p.m   [facility]  [address] [place] [address]  [phone] ask for [facility]    Imaging [phone]       Urgent Care locations:    [facility] Saturday and Sunday   9 am - 5 pm    Monday-Friday   12 pm - 8 pm  Saturday and Sunday   9 am - 5 pm   [phone]     [facility] Labor and Delivery:  [phone]    If you need a medication refill, please contact your pharmacy. Please allow 3 business days for your refill to be completed.  As always, Thank you for trusting us with your healthcare needs!                Follow-ups after your visit        Your next 10 appointments already scheduled     Apr 05, 2018  8:15 AM CDT   [facility]    [address] [facility] [address] [phone]            Apr 06, 2018  9:45 AM CDT   (Arrive by 9:30 AM)   MA SCREENING DIGITAL BILATERAL with ANDMA1   [facility]    [address] [facility] [address] [phone]           Do not use any powder, lotion or deodorant under your arms or on your breast. If you do, we will ask you to remove it before your exam.  Wear comfortable, two-piece clothing.  If you have any allergies,  tell your care team.  Bring any previous mammograms from other facilities or have them mailed to the breast center.            Jun 19, 2018 11:00 AM CDT   Return Visit with Charlee Reaves MD   [facility]    [address] [place] [address] [phone]              Who to contact     If you have questions or need follow up information about today's clinic visit or your schedule please contact [facility] directly at [phone].  Normal or non-critical lab and imaging results will be communicated to you by Ansirahart, letter or phone within 4 business days after the clinic has received the results. If you do not hear from us within 7 days, please contact the clinic through Quorum System[employer] or phone. If you have a critical or abnormal lab result, we will notify you by phone as soon as possible.  Submit refill requests through TrueSpan or call your pharmacy and they will forward the refill request to us. Please allow 3 business days for your refill to be completed.          Additional Information About Your Visit        AnsiraharParcelGenie Information     TrueSpan gives you secure access to your electronic health record. If you see a primary care provider, you can also send messages to your care team and make appointments. If you have questions, please call your primary care clinic.  If you do not have a primary care provider, please call [phone] and they will assist you.        Care EveryWhere ID     This is your Care EveryWhere ID. This could be used by other organizations to access your [facility] medical records  [id number]        Your Vitals Were     Pulse Last Period Pulse Oximetry Breastfeeding? BMI (Body Mass Index)       113 03/11/2018 99% No 31.26 kg/m2        Blood Pressure from Last 3 Encounters:   04/02/18 (!) 141/94   02/07/18 130/84   07/13/17 142/90    Weight from Last 3 Encounters:   04/02/18 183 lb 1.6 oz (83.1 kg)   07/13/17 180 lb (81.6  kg)   07/12/16 174 lb (78.9 kg)              Today, you had the following     No orders found for display         Today's Medication Changes          These changes are accurate as of 4/2/18  4:03 PM.  If you have any questions, ask your nurse or doctor.               These medicines have changed or have updated prescriptions.        Dose/Directions    citalopram 20 MG tablet   Commonly known as:  celeXA   This may have changed:  Another medication with the same name was removed. Continue taking this medication, and follow the directions you see here.   Used for:  Major depressive disorder, recurrent episode, mild (H)   Changed by:  Codie Ponce DO        Dose:  20 mg   Take 1 tablet (20 mg) by mouth daily   Quantity:  90 tablet   Refills:  1         Stop taking these medicines if you haven't already. Please contact your care team if you have questions.     CVS ALLERGY RELIEF-D 5-120 MG per 12 hr tablet   Generic drug:  cetirizine-psuedoePHEDrine   Stopped by:  Codie Ponce DO                    Primary Care Provider Office Phone # Fax #    Jacquelyn Elvia Zaldivar [phone] [address] [facility] [address]        Equal Access to Services     [facility]: Hadii levi saenz hadasho Sosheronali, waaxda luqadaha, qaybta kaalmada adeegyada, lynn dunn . So [facility] [phone].    ATENCIÓN: Si habla español, tiene a montez disposición servicios gratuitos de asistencia lingüística. Llame al [phone].    We comply with applicable federal civil rights laws and [place] laws. We do not discriminate on the basis of race, color, national origin, age, disability, sex, sexual orientation, or gender identity.            Thank you!     Thank you for choosing [facility]  for your care. Our goal is always to provide you with excellent care. Hearing back from our patients is one way we can continue to improve our services. Please take a few minutes  to complete the written survey that you may receive in the mail after your visit with us. Thank you!             Your Updated Medication List - Protect others around you: Learn how to safely use, store and throw away your medicines at www.disposemymeds.org.          This list is accurate as of 4/2/18  4:03 PM.  Always use your most recent med list.                   Brand Name Dispense Instructions for use Diagnosis    cetirizine 10 MG tablet    zyrTEC    90 tablet    Take 1 tablet (10 mg) by mouth every evening    Seasonal allergic rhinitis, unspecified chronicity, unspecified trigger       citalopram 20 MG tablet    celeXA    90 tablet    Take 1 tablet (20 mg) by mouth daily    Major depressive disorder, recurrent episode, mild (H)       fluticasone 50 MCG/ACT spray    FLONASE    48 g    Spray 1 spray into both nostrils 2 times daily    Chronic rhinitis       LORazepam 1 MG tablet    ATIVAN    20 tablet    Take 1 tablet 30 minutes prior to flight and as needed for severe anxiety.    Fear of flying       metFORMIN 500 MG tablet    GLUCOPHAGE    180 tablet    Take 1 tablet (500 mg) by mouth 2 times daily (with meals)    PCOS (polycystic ovarian syndrome)       Multi-vitamin Tabs tablet      Take 1 tablet by mouth daily        norethindrone-ethinyl estradiol 1-20 MG-MCG per tablet    JUNEL FE 1/20    84 tablet    Take 1 tablet by mouth daily    Encounter for surveillance of contraceptive pills

## 2018-04-02 NOTE — PROGRESS NOTES
SUBJECTIVE:   Ivania Gomez is a 42 year old female who presents to clinic today for the following health issues:      Follow up from E-visit and labs drawn 3/26/18 - patient did see Dr. Ponce on 4/2/18 - would like to discuss that visit.    Reviewed OB/GYN plan/options with pt.  Pt not sure how she will go forward but needs to do something as the increased pain is impacting her daily activities and work.  Having to miss events/work due to pain.  Cannot take nsaids due to true allergy.      Problem list and histories reviewed & adjusted, as indicated.  Additional history: as documented    Patient Active Problem List   Diagnosis     CARDIOVASCULAR SCREENING; LDL GOAL LESS THAN 160     Mild major depression (H)     PCOS (polycystic ovarian syndrome)     S/P laparoscopic cholecystectomy     Acne vulgaris     History of dysplastic nevus     Melanocytic nevus     History of gestational diabetes     Chronic rhinitis     Hypertriglyceridemia     Past Surgical History:   Procedure Laterality Date     BIOPSY      moles removed     CHOLECYSTECTOMY  2011     COLPOSCOPY CERVIX, BIOPSY CERVIX, ENDOCERVICAL CURETTAGE, COMBINED  1994    benign     EYE SURGERY  2009    lasix     EYE SURGERY      Lasik       Social History   Substance Use Topics     Smoking status: Never Smoker     Smokeless tobacco: Never Used      Comment: nonsmoking household     Alcohol use Yes      Comment: occasional     Family History   Problem Relation Age of Onset     CANCER Mother      vulvar sarcoma     Hypertension Mother      Other Cancer Mother      vulvar sarcoma     Thyroid Disease Mother      Prostate Cancer Paternal Grandfather      Hypertension Maternal Grandmother      CANCER Maternal Grandfather      lung     Hyperlipidemia Brother      Thyroid Disease Sister      Anxiety Disorder Sister      Thyroid Disease Sister          Current Outpatient Prescriptions   Medication Sig Dispense Refill     HYDROcodone-acetaminophen (NORCO) 5-325 MG per  tablet Take 1 tablet by mouth every 6 hours as needed for severe pain maximum 6 tablet(s) per day 20 tablet 0     cetirizine (ZYRTEC) 10 MG tablet Take 1 tablet (10 mg) by mouth every evening 90 tablet 3     metFORMIN (GLUCOPHAGE) 500 MG tablet Take 1 tablet (500 mg) by mouth 2 times daily (with meals) 180 tablet 1     citalopram (CELEXA) 20 MG tablet Take 1 tablet (20 mg) by mouth daily 90 tablet 1     fluticasone (FLONASE) 50 MCG/ACT nasal spray Spray 1 spray into both nostrils 2 times daily 48 g 3     LORazepam (ATIVAN) 1 MG tablet Take 1 tablet 30 minutes prior to flight and as needed for severe anxiety. 20 tablet 0     multivitamin, therapeutic with minerals (MULTI-VITAMIN) TABS Take 1 tablet by mouth daily       norethindrone-ethinyl estradiol (JUNEL FE 1/20) 1-20 MG-MCG per tablet Take 1 tablet by mouth daily (Patient not taking: Reported on 4/5/2018) 84 tablet 3     BP Readings from Last 3 Encounters:   04/05/18 (!) 154/100   04/02/18 (!) 141/94   02/07/18 130/84    Wt Readings from Last 3 Encounters:   04/05/18 180 lb (81.6 kg)   04/02/18 183 lb 1.6 oz (83.1 kg)   07/13/17 180 lb (81.6 kg)                  Labs reviewed in EPIC    Reviewed and updated as needed this visit by clinical staff  Tobacco  Allergies  Meds  Problems  Med Hx  Surg Hx  Fam Hx  Soc Hx        Reviewed and updated as needed this visit by Provider  Allergies  Meds  Problems         ROS:  Constitutional, HEENT, cardiovascular, pulmonary, gi and gu systems are negative, except as otherwise noted.    OBJECTIVE:     BP (!) 154/100  Pulse 126  Temp 97.9  F (36.6  C) (Oral)  Wt 180 lb (81.6 kg)  LMP 03/11/2018  BMI 30.73 kg/m2  Body mass index is 30.73 kg/(m^2).  GENERAL: healthy, alert and no distress  EYES: Eyes grossly normal to inspection, PERRL and conjunctivae and sclerae normal  MS: no gross musculoskeletal defects noted, no edema  SKIN: no suspicious lesions or rashes  PSYCH: mentation appears normal, affect  normal/bright    Diagnostic Test Results:  none     ASSESSMENT/PLAN:     (D25.1,  D25.0) Intramural and submucous leiomyoma of uterus  (primary encounter diagnosis)  Comment: causing significant symptoms  Plan: HYDROcodone-acetaminophen (NORCO) 5-325 MG per         tablet        Trial of norco for use at home.  Discussed options, pt may consider hysterectomy instead of lupron/embolization.  20 minutes of the 20 minute appointment was spent with counseling and education and/or coordinating care with regards to above problem(s).     (F32.0) Mild major depression (H)  Comment: stable  Plan: no refill needed at this time, ok to fill if requested x 6 months    See Patient Instructions    Jacquelyn Zaldivar MD  [facility]

## 2018-04-03 NOTE — PROGRESS NOTES
A urine specimen was never received on 4.2.18 so I am cancelling the urine pregnancy test ordered. If you would like to still have this test completed, please have the patient return to the lab for testing and put new orders in as FUTURE.     Thank you,    Patricia Mack MLT([facility])

## 2018-04-05 ENCOUNTER — OFFICE VISIT (OUTPATIENT)
Dept: FAMILY MEDICINE | Facility: CLINIC | Age: 43
End: 2018-04-05
Payer: COMMERCIAL

## 2018-04-05 VITALS
BODY MASS INDEX: 30.73 KG/M2 | HEART RATE: 126 BPM | TEMPERATURE: 97.9 F | DIASTOLIC BLOOD PRESSURE: 100 MMHG | WEIGHT: 180 LBS | SYSTOLIC BLOOD PRESSURE: 154 MMHG

## 2018-04-05 DIAGNOSIS — D25.0 INTRAMURAL AND SUBMUCOUS LEIOMYOMA OF UTERUS: Primary | ICD-10-CM

## 2018-04-05 DIAGNOSIS — F32.0 MILD MAJOR DEPRESSION (H): ICD-10-CM

## 2018-04-05 DIAGNOSIS — D25.1 INTRAMURAL AND SUBMUCOUS LEIOMYOMA OF UTERUS: Primary | ICD-10-CM

## 2018-04-05 PROCEDURE — 99213 OFFICE O/P EST LOW 20 MIN: CPT | Performed by: FAMILY MEDICINE

## 2018-04-05 RX ORDER — HYDROCODONE BITARTRATE AND ACETAMINOPHEN 5; 325 MG/1; MG/1
1 TABLET ORAL EVERY 6 HOURS PRN
Qty: 20 TABLET | Refills: 0 | Status: SHIPPED | OUTPATIENT
Start: 2018-04-05 | End: 2018-05-16

## 2018-04-05 NOTE — MR AVS SNAPSHOT
After Visit Summary   4/5/2018    Ivania Gomez    MRN: [id number]           Patient Information     Date Of Birth          1975        Visit Information        Provider Department      4/5/2018 8:15 AM Jacquelyn Zaldivar MD [facility]        Today's Diagnoses     Intramural and submucous leiomyoma of uterus    -  1    Mild major depression (H)           Follow-ups after your visit        Your next 10 appointments already scheduled     Jun 19, 2018 11:00 AM CDT   [facility]   [facility]    [id number] [place] [address] [phone]              Who to contact     If you have questions or need follow up information about today's clinic visit or your schedule please contact [facility] directly at [phone].  Normal or non-critical lab and imaging results will be communicated to you by MyChart, letter or phone within 4 business days after the clinic has received the results. If you do not hear from us within 7 days, please contact the clinic through MyChart or phone. If you have a critical or abnormal lab result, we will notify you by phone as soon as possible.  Submit refill requests through 3dplusme or call your pharmacy and they will forward the refill request to us. Please allow 3 business days for your refill to be completed.          Additional Information About Your Visit        MyChart Information     3dplusme gives you secure access to your electronic health record. If you see a primary care provider, you can also send messages to your care team and make appointments. If you have questions, please call your primary care clinic.  If you do not have a primary care provider, please call [phone] and they will assist you.        Care EveryWhere ID     This is your Care EveryWhere ID. This could be used by other organizations to access your [facility]  records  [id number]        Your Vitals Were     Pulse Temperature Last Period BMI (Body Mass Index)          126 97.9  F (36.6  C) (Oral) 03/11/2018 30.73 kg/m2         Blood Pressure from Last 3 Encounters:   04/05/18 (!) 154/100   04/02/18 (!) 141/94   02/07/18 130/84    Weight from Last 3 Encounters:   04/05/18 180 lb (81.6 kg)   04/02/18 183 lb 1.6 oz (83.1 kg)   07/13/17 180 lb (81.6 kg)              Today, you had the following     No orders found for display         Today's Medication Changes          These changes are accurate as of 4/5/18 11:59 PM.  If you have any questions, ask your nurse or doctor.               Start taking these medicines.        Dose/Directions    HYDROcodone-acetaminophen 5-325 MG per tablet   Commonly known as:  NORCO   Used for:  Intramural and submucous leiomyoma of uterus   Started by:  Jacquelyn Zaldivar MD        Dose:  1 tablet   Take 1 tablet by mouth every 6 hours as needed for severe pain maximum 6 tablet(s) per day   Quantity:  20 tablet   Refills:  0            Where to get your medicines      Some of these will need a paper prescription and others can be bought over the counter.  Ask your nurse if you have questions.     Bring a paper prescription for each of these medications     HYDROcodone-acetaminophen 5-325 MG per tablet                Primary Care Provider Office Phone # Fax #    Jacquelyn Zaldivar [phone] [address] [facility] [address]        Equal Access to Services     SHELBIE GALVEZ AH: Hadii levi ku hadasho Soomaali, waaxda luqadaha, qaybta kaalmada adeegyada, waxay idiin hayaan ademaximino dunn . So [facility] [phone].    ATENCIÓN: Si habla español, tiene a montez disposición servicios gratuitos de asistencia lingüística. Llame al [phone].    We comply with applicable federal civil rights laws and [place] laws. We do not discriminate on the basis of race, color, national origin, age, disability, sex, sexual  orientation, or gender identity.            Thank you!     Thank you for choosing [facility]  [facility]. Our goal is always to provide you with excellent care. Hearing back from our patients is one way we can continue to improve our services. Please take a few minutes to complete the written survey that you may receive in the mail after your visit with us. Thank you!             Your Updated Medication List - Protect others around you: Learn how to safely use, store and throw away your medicines at www.disposemymeds.org.          This list is accurate as of 4/5/18 11:59 PM.  Always use your most recent med list.                   Brand Name Dispense Instructions for use Diagnosis    cetirizine 10 MG tablet    zyrTEC    90 tablet    Take 1 tablet (10 mg) by mouth every evening    Seasonal allergic rhinitis, unspecified chronicity, unspecified trigger       citalopram 20 MG tablet    celeXA    90 tablet    Take 1 tablet (20 mg) by mouth daily    Major depressive disorder, recurrent episode, mild (H)       fluticasone 50 MCG/ACT spray    FLONASE    48 g    Spray 1 spray into both nostrils 2 times daily    Chronic rhinitis       HYDROcodone-acetaminophen 5-325 MG per tablet    NORCO    20 tablet    Take 1 tablet by mouth every 6 hours as needed for severe pain maximum 6 tablet(s) per day    Intramural and submucous leiomyoma of uterus       LORazepam 1 MG tablet    ATIVAN    20 tablet    Take 1 tablet 30 minutes prior to flight and as needed for severe anxiety.    Fear of flying       metFORMIN 500 MG tablet    GLUCOPHAGE    180 tablet    Take 1 tablet (500 mg) by mouth 2 times daily (with meals)    PCOS (polycystic ovarian syndrome)       Multi-vitamin Tabs tablet      Take 1 tablet by mouth daily        norethindrone-ethinyl estradiol 1-20 MG-MCG per tablet    JUNEL FE 1/20    84 tablet    Take 1 tablet by mouth daily    Encounter for surveillance of contraceptive pills

## 2018-04-06 ENCOUNTER — RADIANT APPOINTMENT (OUTPATIENT)
Dept: MAMMOGRAPHY | Facility: CLINIC | Age: 43
End: 2018-04-06
Payer: COMMERCIAL

## 2018-04-06 DIAGNOSIS — Z12.31 VISIT FOR SCREENING MAMMOGRAM: ICD-10-CM

## 2018-04-06 PROCEDURE — 77067 SCR MAMMO BI INCL CAD: CPT | Mod: TC

## 2018-04-10 LAB — COPATH REPORT: NORMAL

## 2018-04-12 ENCOUNTER — MYC MEDICAL ADVICE (OUTPATIENT)
Dept: OBGYN | Facility: CLINIC | Age: 43
End: 2018-04-12

## 2018-04-12 NOTE — TELEPHONE ENCOUNTER
Notes per Dr. Encinas at last office visit related to diagnosis on 04-02-18:  She is not interested in future childbearing but is hoping to avoid a hysterectomy for treatment    Message handled by Nurse Triage with [facility] - provider name: Dr. Ponce.  Patient should schedule an appt to discuss surgical options.  Lucy Hoffman RN, BAN

## 2018-04-18 ENCOUNTER — OFFICE VISIT (OUTPATIENT)
Dept: OBGYN | Facility: CLINIC | Age: 43
End: 2018-04-18
Payer: COMMERCIAL

## 2018-04-18 VITALS
SYSTOLIC BLOOD PRESSURE: 144 MMHG | BODY MASS INDEX: 31.65 KG/M2 | WEIGHT: 185.4 LBS | DIASTOLIC BLOOD PRESSURE: 86 MMHG | OXYGEN SATURATION: 100 % | HEART RATE: 132 BPM

## 2018-04-18 DIAGNOSIS — N92.0 MENORRHAGIA WITH REGULAR CYCLE: Primary | ICD-10-CM

## 2018-04-18 PROCEDURE — 99214 OFFICE O/P EST MOD 30 MIN: CPT | Performed by: OBSTETRICS & GYNECOLOGY

## 2018-04-18 NOTE — MR AVS SNAPSHOT
After Visit Summary   4/18/2018    Ivania Gomez    MRN: [id number]           Patient Information     Date Of Birth          1975        Visit Information        Provider Department      4/18/2018 2:30 PM Codie Ponce DO [facility] [place] [place]        Care Instructions                                                         If you have any questions regarding your visit, Please contact your care team.    [facility] CLINIC HOURS TELEPHONE NUMBER   Codie Ponce DO.    SRIKANTH Hirsch -    URVASHI Ayala       Monday, Wednesday, [facility]  8:30a.m-5:00 p.m   [facility]  [address] [place] [address] [phone] ask for [facility]    Imaging [phone]       Urgent Care locations:    [facility] Saturday and Sunday   9 am - 5 pm    Monday-Friday   12 pm - 8 pm  Saturday and Sunday   9 am - 5 pm   [phone]     [facility] Labor and Delivery:  [phone]    If you need a medication refill, please contact your pharmacy. Please allow 3 business days for your refill to be completed.  As always, Thank you for trusting us with your healthcare needs!                Follow-ups after your visit        Your next 10 appointments already scheduled     Jun 19, 2018 11:00 AM CDT   Return Visit with Charlee Reaves MD   [facility] ([facility]    [id number] [place] [address] [facility] [address] [phone]              Who to contact     If you have questions or need follow up information about today's clinic visit or your schedule please contact [facility] directly at [phone].  Normal or non-critical lab and imaging results will be communicated to you by MyChart, letter or phone within 4 business days after the clinic has received the results. If you do not hear from us within 7 days, please  contact the clinic through [employer] or phone. If you have a critical or abnormal lab result, we will notify you by phone as soon as possible.  Submit refill requests through [employer]edia or call your pharmacy and they will forward the refill request to us. Please allow 3 business days for your refill to be completed.          Additional Information About Your Visit        t3n Magazinhart Information     [employer] gives you secure access to your electronic health record. If you see a primary care provider, you can also send messages to your care team and make appointments. If you have questions, please call your primary care clinic.  If you do not have a primary care provider, please call [phone] and they will assist you.        Care EveryWhere ID     This is your Care EveryWhere ID. This could be used by other organizations to access your [place] medical records  [id number]        Your Vitals Were     Pulse Last Period Pulse Oximetry Breastfeeding? BMI (Body Mass Index)       132 03/11/2018 100% No 31.65 kg/m2        Blood Pressure from Last 3 Encounters:   04/18/18 144/86   04/05/18 (!) 154/100   04/02/18 (!) 141/94    Weight from Last 3 Encounters:   04/18/18 185 lb 6.4 oz (84.1 kg)   04/05/18 180 lb (81.6 kg)   04/02/18 183 lb 1.6 oz (83.1 kg)              Today, you had the following     No orders found for display       Primary Care Provider Office Phone # Fax #    Jacquelyn Elvia Zaldivar [phone] [address] [facility] [address]        Equal Access to Services     SHELBIE GALVEZ : Hadii aad ku hadasho Soomaali, waaxda luqadaha, qaybta kaalmada adeegyada, lynn urena. So [facility] [phone].    ATENCIÓN: Si habla español, tiene a montez disposición servicios gratuitos de asistencia lingüística. Llame al [phone].    We comply with applicable federal civil rights laws and [place] laws. We do not discriminate on the basis of race, color, national origin, age,  disability, sex, sexual orientation, or gender identity.            Thank you!     Thank you for choosing [facility]  for your care. Our goal is always to provide you with excellent care. Hearing back from our patients is one way we can continue to improve our services. Please take a few minutes to complete the written survey that you may receive in the mail after your visit with us. Thank you!             Your Updated Medication List - Protect others around you: Learn how to safely use, store and throw away your medicines at www.disposemymeds.org.          This list is accurate as of 4/18/18  2:36 PM.  Always use your most recent med list.                   Brand Name Dispense Instructions for use Diagnosis    cetirizine 10 MG tablet    zyrTEC    90 tablet    Take 1 tablet (10 mg) by mouth every evening    Seasonal allergic rhinitis, unspecified chronicity, unspecified trigger       citalopram 20 MG tablet    celeXA    90 tablet    Take 1 tablet (20 mg) by mouth daily    Major depressive disorder, recurrent episode, mild (H)       fluticasone 50 MCG/ACT spray    FLONASE    48 g    Spray 1 spray into both nostrils 2 times daily    Chronic rhinitis       HYDROcodone-acetaminophen 5-325 MG per tablet    NORCO    20 tablet    Take 1 tablet by mouth every 6 hours as needed for severe pain maximum 6 tablet(s) per day    Intramural and submucous leiomyoma of uterus       IRON SUPPLEMENT PO           LORazepam 1 MG tablet    ATIVAN    20 tablet    Take 1 tablet 30 minutes prior to flight and as needed for severe anxiety.    Fear of flying       metFORMIN 500 MG tablet    GLUCOPHAGE    180 tablet    Take 1 tablet (500 mg) by mouth 2 times daily (with meals)    PCOS (polycystic ovarian syndrome)       Multi-vitamin Tabs tablet      Take 1 tablet by mouth daily        norethindrone-ethinyl estradiol 1-20 MG-MCG per tablet    JUNEL FE 1/20    84 tablet    Take 1 tablet by mouth daily    Encounter for  surveillance of contraceptive pills       VITAMIN C PO

## 2018-04-18 NOTE — PROGRESS NOTES
"This 41 y/o female, , LMP 3/11/18, presents c/o ongoing menorrrhagia issues.  She would like to review her options again since she has not decided what she would like to do next.  A recent US on 3/29/18 demonstrated an enlarged uterus measuring 10.4 x 5 x 6.1 cm with a 3.4 cm fibroid.  Her endometrial biopsy on 18 showed benign findings.  She is s/p C/S on  preceded by a  in  and has a hx of PCOS.  She states that with her cholecystectomy she was told that she had extensive scar tissue and she admits to scarring \"easily.\"  She has not tried Lupron yet but has concerns regarding cost.  She is not interested in future childbearing and currently uses abstinence for birth control.  /86  Pulse 132  Wt 185 lb 6.4 oz (84.1 kg)  LMP 2018  SpO2 100%  Breastfeeding? No  BMI 31.65 kg/m2  A PE was not performed today.  Assessment - menorrhagia felt to be due to a fibroid uterus  Plan - We reviewed her treatment options again and she prefers to discuss these with her  at home before making a decision.  She is leaning toward an outright hysterectomy and is wondering if she would be a candidate for a laparoscopic or robotic approach.  I do not feel that a vaginal approach would be possible without apreoperative course of Lupron.  She is to call back with her decision or f/u with Dr. Ramos to discuss a possible laparoscopic hysterectomy since she normally goes to the [place] office for her medical care.  He also performed her C/S so she was comfortable with this option.  This was a 30-minute visit and over 50% of the time was spent in direct pt consultation.   "

## 2018-04-18 NOTE — PATIENT INSTRUCTIONS
If you have any questions regarding your visit, Please contact your care team.    [facility] CLINIC HOURS TELEPHONE NUMBER   Codie Ponce DO.    SRIKANTH Hirsch -    URVASHI Ayala       Monday, Wednesday, Thursday and Friday, [place]  8:30a.m-5:00 p.m   [facility]  [address] [place] [address]  [phone] ask for [facility]    Imaging [phone]       Urgent Care locations:    [facility] Saturday and Sunday   9 am - 5 pm    Monday-Friday   12 pm - 8 pm  Saturday and Sunday   9 am - 5 pm   [phone]     [facility] Labor and Delivery:  [phone]    If you need a medication refill, please contact your pharmacy. Please allow 3 business days for your refill to be completed.  As always, Thank you for trusting us with your healthcare needs!

## 2018-04-30 ENCOUNTER — OFFICE VISIT (OUTPATIENT)
Dept: OBGYN | Facility: CLINIC | Age: 43
End: 2018-04-30
Payer: COMMERCIAL

## 2018-04-30 VITALS
BODY MASS INDEX: 30.9 KG/M2 | WEIGHT: 181 LBS | SYSTOLIC BLOOD PRESSURE: 146 MMHG | TEMPERATURE: 97.6 F | DIASTOLIC BLOOD PRESSURE: 100 MMHG | HEART RATE: 92 BPM

## 2018-04-30 DIAGNOSIS — D25.0 SUBMUCOUS LEIOMYOMA OF UTERUS: ICD-10-CM

## 2018-04-30 DIAGNOSIS — N92.0 EXCESSIVE OR FREQUENT MENSTRUATION: Primary | ICD-10-CM

## 2018-04-30 PROCEDURE — 99214 OFFICE O/P EST MOD 30 MIN: CPT | Performed by: OBSTETRICS & GYNECOLOGY

## 2018-04-30 NOTE — PROGRESS NOTES
Ivania is a 42 year old   is here today complaining of heavy bleeding for >1 month.  She has been evaluated for this.  She has also tried ORAL CONTRACEPTIVE PILLS without success.   She reports significant cramping and passage of clots.  No urinary frequency or dysuria, bladder or kidney problems    ROS: Ten point review of systems was reviewed and negative except the above.    Gyn Hx:      Past Medical History:   Diagnosis Date     Acne vulgaris      Allergies      Anxiety      ASCUS on Pap smear     colp benign     Depressive disorder     post partum and anxiety issues     Diabetes mellitus (H)      History of dysplastic nevus      Melanocytic nevus 2012     Obesity, unspecified      PCOS (polycystic ovarian syndrome)      Past Surgical History:   Procedure Laterality Date     BIOPSY      moles removed     CHOLECYSTECTOMY       COLPOSCOPY CERVIX, BIOPSY CERVIX, ENDOCERVICAL CURETTAGE, COMBINED      benign     EYE SURGERY      lasix     EYE SURGERY      Lasik     Patient Active Problem List   Diagnosis     CARDIOVASCULAR SCREENING; LDL GOAL LESS THAN 160     Mild major depression (H)     PCOS (polycystic ovarian syndrome)     S/P laparoscopic cholecystectomy     Acne vulgaris     History of dysplastic nevus     Melanocytic nevus     History of gestational diabetes     Chronic rhinitis     Hypertriglyceridemia     Submucous leiomyoma of uterus     Excessive or frequent menstruation       ALL/Meds: Her medication and allergy histories were reviewed and are documented in their appropriate chart areas.    SH: Reviewed and documented in the appropriate area of the chart.  FH:  Her family history is reviewed and updated in the chart, today.  PMH: Her past medical, surgical, and obstetric histories were reviewed and updated today in the appropriate chart areas.    PE: BP (!) 146/100  Pulse 92  Temp 97.6  F (36.4  C) (Oral)  Wt 181 lb (82.1 kg)  LMP 2018 (Exact Date)  BMI 30.9  kg/m2  Body mass index is 30.9 kg/(m^2).    General Appearance:  healthy, alert, active, no distress  Cardiovascular:  Regular rate and Rhythm  Neck: Supple, no adenopathy and thyroid normal  Lungs:  Clear, without wheeze, rale or rhonchi  Breast: deferred  Abdomen: Benign, Soft, flat, non-tender, No masses, organomegaly, No inguinal nodes and Bowel sounds normoactive.   Pelvic:       - Ext: Vulva and perineum are normal without lesion, mass or discharge        - Urethra: normal without discharge or scarring no hypermobility       - Urethral Meatus: normal appearance,        - Bladder: no tenderness, no masses       - Vagina:  without discharge and rugated       - Cervix: multiparous       - Uterus:enlarged 8 week size, moderate mobility       - Adnexa: Normal without masses or tenderness       - Rectal: deferred    Ultrasound:  The uterus measures 10.4 x 5.0 x 6.1 cm. There is a 3.2 x 2.8  x 3.4 cm myoma in the anterior aspect of the upper uterine body. This  is submucosal in location and is immediately adjacent to the  endometrium where there is some distortion of the endometrial lining.  No other myometrial abnormality is demonstrated. The endometrium is  normal in thickness measuring 0.8 cm. The right ovary contains several  small follicles, all measuring less than 1 cm. The left ovary is  normal. Normal blood flow is seen in both ovaries. No adnexal  pathology is seen. There is no free fluid in the cul-de-sac.         IMPRESSION: There is a 3.4 cm myoma in the anterior aspect of the  upper uterine body resulting in mass effect upon the endometrial  Lining.    I discussed fibroids.  We discussed their origin, the fact that while they are benign, they do tend to grow slowly in response to estrogen.  We discussed the normal resolution that gradually occurs after menopause.  I explained that fibroids are very common and in many cases do not cause symptoms.  We discussed these symptoms, including menorrhagia,  metrorrhagia, dysmenorrhea as well as the mass effect that fibroids can cause.  We discussed options for treatment.  These include conservative observation, symptomatic hormonal control, myomectomy, uterine artery embolization, and hysterectomy.  We discussed the RISKS, BENEFITS, AND ALTERNATIVES of each of these options.  This includes the risk of post-procedure pain, passage of a necrosed fibroid and the need for post embolization hysterectomy.   Reviewed the risks, benefits, and alternatives of hysterectomy including but not limited to bleeding, infection, injury to bowel,bladder and other structures.  The patient is aware that hysterectomy will render her sterile and unable to have further children.  We discussed the different routes of surgery including abdominal, vaginal, and laparoscopic and the possibility that the route of surgery may change during the procedure.  We discussed both total and supracervical hysterectomy and the benefits and contraindications involved.  We discussed ovarian sparing as well as oophrectomy. Reviewed pre and post op course. Patient was given the opportunity to ask questions and have them answered. The patient appears to understand.    A/P:  (N92.0) Excessive or frequent menstruation  (primary encounter diagnosis)  (D25.0) Submucous leiomyoma of uterus  Comment: Out of 30 minutes spent face to face with the patient, > 50% of this was spent in consultation.  Plan: TOTAL LAPAROSCOPIC HYSTERECTOMY BILATERAL SALPINGECTOMY possible laparotomy       - No orders of the defined types were placed in this encounter.

## 2018-04-30 NOTE — NURSING NOTE
"Chief Complaint   Patient presents with     Consult     Surgery consultation for laparoscopic hysterectomy-Review last US report and EMB       Initial BP (!) 146/100  Pulse 92  Temp 97.6  F (36.4  C) (Oral)  Wt 181 lb (82.1 kg)  LMP 04/29/2018 (Exact Date)  BMI 30.9 kg/m2 Estimated body mass index is 30.9 kg/(m^2) as calculated from the following:    Height as of 7/13/17: 5' 4.17\" (1.63 m).    Weight as of this encounter: 181 lb (82.1 kg).  Medication Reconciliation: complete       Lakisha Garcia CMA      "

## 2018-04-30 NOTE — MR AVS SNAPSHOT
After Visit Summary   4/30/2018    Ivania Gomez    MRN: [id number]           Patient Information     Date Of Birth          1975        Visit Information        Provider Department      4/30/2018 9:00 AM Mauri Ramos MD [facility]        Today's Diagnoses     Excessive or frequent menstruation    -  1    Submucous leiomyoma of uterus           Follow-ups after your visit        Your next 10 appointments already scheduled     Jun 19, 2018 11:00 AM CDT   Return Visit with Charlee Reaves MD   [facility]    [address] [place] [address] [phone]              Who to contact     If you have questions or need follow up information about today's clinic visit or your schedule please contact [facility] directly at [phone].  Normal or non-critical lab and imaging results will be communicated to you by MyChart, letter or phone within 4 business days after the clinic has received the results. If you do not hear from us within 7 days, please contact the clinic through MyChart or phone. If you have a critical or abnormal lab result, we will notify you by phone as soon as possible.  Submit refill requests through [employer] or call your pharmacy and they will forward the refill request to us. Please allow 3 business days for your refill to be completed.          Additional Information About Your Visit        MyChart Information     [employer] gives you secure access to your electronic health record. If you see a primary care provider, you can also send messages to your care team and make appointments. If you have questions, please call your primary care clinic.  If you do not have a primary care provider, please call [phone] and they will assist you.        Care EveryWhere ID     This is your Care EveryWhere ID. This could be used by other organizations to access your [facility]  records  [id number]        Your Vitals Were     Pulse Temperature Last Period BMI (Body Mass Index)          92 97.6  F (36.4  C) (Oral) 04/29/2018 (Exact Date) 30.9 kg/m2         Blood Pressure from Last 3 Encounters:   04/30/18 (!) 146/100   04/18/18 144/86   04/05/18 (!) 154/100    Weight from Last 3 Encounters:   04/30/18 181 lb (82.1 kg)   04/18/18 185 lb 6.4 oz (84.1 kg)   04/05/18 180 lb (81.6 kg)              We Performed the Following     [employer]ative Worksheet        Primary Care Provider Office Phone # Fax #    Jacquelyn Zaldivar [phone] [address] [facility] [address]        Equal Access to Services     SHELBIE GALVEZ : Hadii levi saenz hadasho Socarlyle, waaxda luqadaha, qaybta kaalmada adeegyaroney, lynn dunn . So [facility] [phone].    ATENCIÓN: Si habla español, tiene a montez disposición servicios gratuitos de asistencia lingüística. Natalia al [phone].    We comply with applicable federal civil rights laws and [place] laws. We do not discriminate on the basis of race, color, national origin, age, disability, sex, sexual orientation, or gender identity.            Thank you!     Thank you for choosing [facility]  for your care. Our goal is always to provide you with excellent care. Hearing back from our patients is one way we can continue to improve our services. Please take a few minutes to complete the written survey that you may receive in the mail after your visit with us. Thank you!             Your Updated Medication List - Protect others around you: Learn how to safely use, store and throw away your medicines at www.disposemymeds.org.          This list is accurate as of 4/30/18  9:50 AM.  Always use your most recent med list.                   Brand Name Dispense Instructions for use Diagnosis    cetirizine 10 MG tablet    zyrTEC    90 tablet    Take 1 tablet (10 mg) by mouth every evening    Seasonal allergic  rhinitis, unspecified chronicity, unspecified trigger       citalopram 20 MG tablet    celeXA    90 tablet    Take 1 tablet (20 mg) by mouth daily    Major depressive disorder, recurrent episode, mild (H)       fluticasone 50 MCG/ACT spray    FLONASE    48 g    Spray 1 spray into both nostrils 2 times daily    Chronic rhinitis       HYDROcodone-acetaminophen 5-325 MG per tablet    NORCO    20 tablet    Take 1 tablet by mouth every 6 hours as needed for severe pain maximum 6 tablet(s) per day    Intramural and submucous leiomyoma of uterus       IRON SUPPLEMENT PO           LORazepam 1 MG tablet    ATIVAN    20 tablet    Take 1 tablet 30 minutes prior to flight and as needed for severe anxiety.    Fear of flying       metFORMIN 500 MG tablet    GLUCOPHAGE    180 tablet    Take 1 tablet (500 mg) by mouth 2 times daily (with meals)    PCOS (polycystic ovarian syndrome)       Multi-vitamin Tabs tablet      Take 1 tablet by mouth daily        norethindrone-ethinyl estradiol 1-20 MG-MCG per tablet    JUNEL FE 1/20    84 tablet    Take 1 tablet by mouth daily    Encounter for surveillance of contraceptive pills       VITAMIN C PO

## 2018-05-01 ENCOUNTER — TELEPHONE (OUTPATIENT)
Dept: OBGYN | Facility: CLINIC | Age: 43
End: 2018-05-01

## 2018-05-01 NOTE — TELEPHONE ENCOUNTER
Associated Diagnoses      Excessive or frequent menstruation  - Primary         Submucous leiomyoma of uterus           Comments      Hysterectomy consent signed       Order Questions      Question Answer Comment     Procedure name(s) - multi select Total Laparoscopic Hysterectomy, possible Salpingectomy, possible Laparotomy      Reason for procedure Fibroid uterus      Is this a multi surgeon case? No      Laterality N/A      Request for additional equipment Other (see comments) None     Anesthesia General      Initiate Pre-op orders for above procedure: Yes, as ordered in Epic Additional orders noted there also     Location of Case: [facility]      Operating room  requested: Yes      Urgency of Surgery: Routine      Surgeon Procedure Time (incision to closure) in minutes (per procedure as applicable) 2      Note:  Surgical Case Time Needed (in minutes)     Patient Class (for admit prior to surgery, specify number of days in comments): Same day (hospital outpatient)      Why can t this outpatient surgery be done at the [facility] or  [facility]? Possible laparotomy      H&P To Be Completed By: PCP      Where is the note? In [facility] Scanned Document      Post-Op Appointment 2 weeks      Vendor Needed? Yes          Date 05/25/2018  Time 9:00AM  -PT was informed that a  is needed, pre op scheduled, NPO 10 hrs prior, and a surgery packet was placed in the mail.  Surgery Pre-Certification    Medical Record Number: [id number]  Ivania Gomez  YOB: 1975   Phone: [phone] (home)   Primary Provider: Jacquelyn Zaldivar    Reason for Admit:  Same day    Surgeon: ALEXANDER Ramos MD  Surgical Procedure: Total Laparoscopic Hysterectomy, possible Salpingectomy, possible Laparotomy  ICD-9 Coded: D25.0, N92.0  Date of Surgery: 05/25/2018  Consent signed? not yet    Date signed: not yet  Hospital: [facility]  Outpatient less than 24 hour stay    Requestor:  Rissa Cooper      Location:  [facility] [phone]  Rissa Cooper CMA

## 2018-05-03 NOTE — TELEPHONE ENCOUNTER
I spoke with López at [facility] this patient is in network and eligible for services at [facility]-NO PA is required for outpatient CPT 66246 Total Laparoscopic Hysterectomy, possible Salpingectomy, possible Laparotomy- Patient will have 90% coverage after $2,700 deductible has been met current accumulations are $1,960 [id number]

## 2018-05-09 NOTE — PROGRESS NOTES
[facility]  [address] [address] [phone]  Dept: [phone]    PRE-OP EVALUATION:  Today's date: 2018    Ivania Gomez (: 1975) presents for pre-operative evaluation assessment as requested by Dr. Ramos.  She requires evaluation and anesthesia risk assessment prior to undergoing surgery/procedure for treatment of Excessive or frequent menstruation, menorrhagia .    Fax number for surgical facility:   Primary Physician: Jacquelyn Zaldivar  Type of Anesthesia Anticipated: to be determined    Patient has a Health Care Directive or Living Will:  NO    Preop Questions 5/15/2018   Who is doing your surgery? Dr. Ramos   What are you having done? Hysterectomy   Date of Surgery/Procedure: 2018   Facility or Hospital where procedure/surgery will be performed: [facility]   1.  Do you have a history of Heart attack, stroke, stent, coronary bypass surgery, or other heart surgery? No   2.  Do you ever have any pain or discomfort in your chest? No   3.  Do you have a history of  Heart Failure? No   4.   Are you troubled by shortness of breath when:  walking on a level surface, or up a slight hill, or at night? No   5.  Do you currently have a cold, bronchitis or other respiratory infection? No   6.  Do you have a cough, shortness of breath, or wheezing? No   7.  Do you sometimes get pains in the calves of your legs when you walk? No   8. Do you or anyone in your family have previous history of blood clots? No   9.  Do you or does anyone in your family have a serious bleeding problem such as prolonged bleeding following surgeries or cuts? No   10. Have you ever had problems with anemia or been told to take iron pills? YES - due to heavy periods   11. Have you had any abnormal blood loss such as black, tarry or bloody stools, or abnormal vaginal bleeding? YES - heavy periods   12. Have you ever had a blood transfusion? No   13. Have you or any of your  relatives ever had problems with anesthesia? No   14. Do you have sleep apnea, excessive snoring or daytime drowsiness? No   15. Do you have any prosthetic heart valves? No   16. Do you have prosthetic joints? No   17. Is there any chance that you may be pregnant? No         HPI:     HPI related to upcoming procedure: has heavy periods causing anemia and significant life altering activities.  Laparoscopic hysterectomy/salpingectomy.  Ovaries will remain.      Anxiety -well controlled on citalopram    PCOS - on metformin    MEDICAL HISTORY:     Patient Active Problem List    Diagnosis Date Noted     Anxiety 05/16/2018     Priority: Medium     Submucous leiomyoma of uterus 04/30/2018     Priority: Medium     Excessive or frequent menstruation 04/30/2018     Priority: Medium     Hypertriglyceridemia 06/02/2015     Priority: Medium     Chronic rhinitis 02/17/2015     Priority: Medium     History of gestational diabetes 04/11/2014     Priority: Medium     Acne vulgaris 06/14/2012     Priority: Medium     History of dysplastic nevus 06/14/2012     Priority: Medium     Melanocytic nevus 06/14/2012     Priority: Medium     (Problem list name updated by automated process. Provider to review and confirm.)       S/P laparoscopic cholecystectomy 04/22/2011     Priority: Medium     PCOS (polycystic ovarian syndrome)      Priority: Medium     Mild major depression (H) 02/28/2011     Priority: Medium     CARDIOVASCULAR SCREENING; LDL GOAL LESS THAN 160 10/31/2010     Priority: Medium      Past Medical History:   Diagnosis Date     Acne vulgaris      Allergies      Anxiety      ASCUS on Pap smear 1994    colp benign     Depressive disorder     post partum and anxiety issues     Diabetes mellitus (H)      History of dysplastic nevus      Melanocytic nevus 6/14/2012     Obesity, unspecified      PCOS (polycystic ovarian syndrome)      Past Surgical History:   Procedure Laterality Date     BIOPSY      moles removed     CHOLECYSTECTOMY   2011     COLPOSCOPY CERVIX, BIOPSY CERVIX, ENDOCERVICAL CURETTAGE, COMBINED  1994    benign     EYE SURGERY  2009    lasix     EYE SURGERY      Lasik     Current Outpatient Prescriptions   Medication Sig Dispense Refill     Ascorbic Acid (VITAMIN C PO)        cetirizine (ZYRTEC) 10 MG tablet Take 1 tablet (10 mg) by mouth every evening 90 tablet 3     citalopram (CELEXA) 20 MG tablet Take 1 tablet (20 mg) by mouth daily 90 tablet 1     Ferrous Sulfate (IRON SUPPLEMENT PO)        fluticasone (FLONASE) 50 MCG/ACT nasal spray Spray 1 spray into both nostrils 2 times daily 48 g 3     metFORMIN (GLUCOPHAGE) 500 MG tablet Take 1 tablet (500 mg) by mouth 2 times daily (with meals) 180 tablet 1     multivitamin, therapeutic with minerals (MULTI-VITAMIN) TABS Take 1 tablet by mouth daily       LORazepam (ATIVAN) 1 MG tablet Take 1 tablet 30 minutes prior to flight and as needed for severe anxiety. (Patient not taking: Reported on 4/18/2018) 20 tablet 0     OTC products: None, except as noted above    Allergies   Allergen Reactions     Ibuprofen Hives and Swelling     Naproxen Hives and Swelling      Latex Allergy: NO    Social History   Substance Use Topics     Smoking status: Never Smoker     Smokeless tobacco: Never Used      Comment: nonsmoking household     Alcohol use Yes      Comment: occasional     History   Drug Use No       REVIEW OF SYSTEMS:   Constitutional, neuro, ENT, endocrine, pulmonary, cardiac, gastrointestinal, genitourinary, musculoskeletal, integument and psychiatric systems are negative, except as otherwise noted.    EXAM:   /80  Pulse 121  Temp 98.6  F (37  C) (Oral)  Resp 16  Wt 181 lb 9.6 oz (82.4 kg)  LMP 04/29/2018 (Exact Date)  BMI 31 kg/m2    GENERAL APPEARANCE: healthy, alert and no distress     EYES: EOMI, PERRL     HENT: ear canals and TM's normal and nose and mouth without ulcers or lesions     NECK: no adenopathy, no asymmetry, masses, or scars and thyroid normal to palpation      RESP: lungs clear to auscultation - no rales, rhonchi or wheezes     CV: regular rates and rhythm, normal S1 S2, no S3 or S4 and no murmur, click or rub     ABDOMEN:  soft, nontender, no HSM or masses and bowel sounds normal     MS: extremities normal- no gross deformities noted, no evidence of inflammation in joints, FROM in all extremities.     SKIN: no suspicious lesions or rashes     NEURO: Normal strength and tone, sensory exam grossly normal, mentation intact, speech normal and cranial nerves 2-12 intact     PSYCH: mentation appears normal. and affect normal/bright    DIAGNOSTICS:   No labs or EKG required for low risk surgery (cataract, skin procedure, breast biopsy, etc)    Recent Labs   Lab Test  03/26/18   0945  07/13/17   1225  02/15/16   1701  04/25/14   0759  12/09/11   1005   02/09/11   1259   HGB  11.0*   --    --    --   11.0*   < >  13.9   PLT  462*   --    --    --    --    --   316   NA   --    --    --   139   --    --   136   POTASSIUM   --    --    --   4.4   --    --   3.7   CR   --   0.72  0.72  0.72   --    --   0.68    < > = values in this interval not displayed.        IMPRESSION:   Reason for surgery/procedure: has heavy periods causing anemia and significant life altering activities.  Laparoscopic hysterectomy/salpingectomy.  Ovaries will remain.    The proposed surgical procedure is considered INTERMEDIATE risk.    REVISED CARDIAC RISK INDEX  The patient has the following serious cardiovascular risks for perioperative complications such as (MI, PE, VFib and 3  AV Block):  No serious cardiac risks  INTERPRETATION: 0 risks: Class I (very low risk - 0.4% complication rate)    The patient has the following additional risks for perioperative complications:  No identified additional risks      ICD-10-CM    1. Preop general physical exam Z01.818    2. Excessive or frequent menstruation N92.0    3. PCOS (polycystic ovarian syndrome) E28.2    4. Anxiety F41.9        RECOMMENDATIONS:          --Patient is to take all scheduled medications on the day of surgery.  APPROVAL GIVEN to proceed with proposed procedure, without further diagnostic evaluation       Signed Electronically by: Jacquelyn Zaldivar MD    Copy of this evaluation report is provided to requesting physician.    [place] Preop Guidelines    Revised Cardiac Risk Index

## 2018-05-12 ENCOUNTER — TRANSFERRED RECORDS (OUTPATIENT)
Dept: HEALTH INFORMATION MANAGEMENT | Facility: CLINIC | Age: 43
End: 2018-05-12

## 2018-05-16 ENCOUNTER — OFFICE VISIT (OUTPATIENT)
Dept: FAMILY MEDICINE | Facility: CLINIC | Age: 43
End: 2018-05-16
Payer: COMMERCIAL

## 2018-05-16 VITALS
TEMPERATURE: 98.6 F | SYSTOLIC BLOOD PRESSURE: 124 MMHG | WEIGHT: 181.6 LBS | HEART RATE: 121 BPM | RESPIRATION RATE: 16 BRPM | DIASTOLIC BLOOD PRESSURE: 80 MMHG | BODY MASS INDEX: 31 KG/M2

## 2018-05-16 DIAGNOSIS — F41.9 ANXIETY: ICD-10-CM

## 2018-05-16 DIAGNOSIS — N92.0 EXCESSIVE OR FREQUENT MENSTRUATION: ICD-10-CM

## 2018-05-16 DIAGNOSIS — Z01.818 PREOP GENERAL PHYSICAL EXAM: Primary | ICD-10-CM

## 2018-05-16 DIAGNOSIS — E28.2 PCOS (POLYCYSTIC OVARIAN SYNDROME): ICD-10-CM

## 2018-05-16 PROCEDURE — 99214 OFFICE O/P EST MOD 30 MIN: CPT | Performed by: FAMILY MEDICINE

## 2018-05-16 ASSESSMENT — PAIN SCALES - GENERAL: PAINLEVEL: NO PAIN (0)

## 2018-05-16 NOTE — MR AVS SNAPSHOT
After Visit Summary   5/16/2018    Ivania Gomez    MRN: [id number]           Patient Information     Date Of Birth          1975        Visit Information        Provider Department      5/16/2018 12:15 PM Jacquelyn Zaldivar MD [facility]        Today's Diagnoses     Preop general physical exam    -  1    Excessive or frequent menstruation        PCOS (polycystic ovarian syndrome)        Anxiety          Care Instructions      Before Your Surgery      Call your surgeon if there is any change in your health. This includes signs of a cold or flu (such as a sore throat, runny nose, cough, rash or fever).    Do not smoke, drink alcohol or take over the counter medicine (unless your surgeon or primary care doctor tells you to) for the 24 hours before and after surgery.    If you take prescribed drugs: Follow your doctor s orders about which medicines to take and which to stop until after surgery.    Eating and drinking prior to surgery: follow the instructions from your surgeon    Take a shower or bath the night before surgery. Use the soap your surgeon gave you to gently clean your skin. If you do not have soap from your surgeon, use your regular soap. Do not shave or scrub the surgery site.  Wear clean pajamas and have clean sheets on your bed.           Follow-ups after your visit        Your next 10 appointments already scheduled     Jun 19, 2018 11:00 AM CDT   [facility] MD   [facility]    [address] [place] [address] [phone]              Who to contact     If you have questions or need follow up information about today's clinic visit or your schedule please contact [facility] directly at [phone].  Normal or non-critical lab and imaging results will be communicated to you by MyChart, letter or phone within 4 business days after the clinic has received the results. If  you do not hear from us within 7 days, please contact the clinic through [employer] or phone. If you have a critical or abnormal lab result, we will notify you by phone as soon as possible.  Submit refill requests through [employer] or call your pharmacy and they will forward the refill request to us. Please allow 3 business days for your refill to be completed.          Additional Information About Your Visit        [email or web address] Information     [employer] gives you secure access to your electronic health record. If you see a primary care provider, you can also send messages to your care team and make appointments. If you have questions, please call your primary care clinic.  If you do not have a primary care provider, please call [phone] and they will assist you.        Care EveryWhere ID     This is your Care EveryWhere ID. This could be used by other organizations to access your [place] medical records  [id number]        Your Vitals Were     Pulse Temperature Respirations Last Period BMI (Body Mass Index)       121 98.6  F (37  C) (Oral) 16 04/29/2018 (Exact Date) 31 kg/m2        Blood Pressure from Last 3 Encounters:   05/16/18 124/80   04/30/18 (!) 146/100   04/18/18 144/86    Weight from Last 3 Encounters:   05/16/18 181 lb 9.6 oz (82.4 kg)   04/30/18 181 lb (82.1 kg)   04/18/18 185 lb 6.4 oz (84.1 kg)              Today, you had the following     No orders found for display       Primary Care Provider Office Phone # Fax #    Jacquelyn Elvia Zaldivar [phone] [address] [facility] [address]        Equal Access to Services     [facility]: Hadii aad ku hadasho Soomaali, waaxda luqadaha, qaybta kaalmada sergio, lynn urena. So [facility] [phone].    ATENCIÓN: Si habla español, tiene a montez disposición servicios gratuitos de asistencia lingüística. Llame al [phone].    We comply with applicable federal civil rights laws and [place] laws. We do not  discriminate on the basis of race, color, national origin, age, disability, sex, sexual orientation, or gender identity.            Thank you!     Thank you for choosing [facility]  for your [facility]. Our goal is always to provide you with excellent care. Hearing back from our patients is one way we can continue to improve our services. Please take a few minutes to complete the written survey that you may receive in the mail after your visit with us. Thank you!             Your Updated Medication List - Protect others around you: Learn how to safely use, store and throw away your medicines at www.disposemymeds.org.          This list is accurate as of 5/16/18  1:02 PM.  Always use your most recent med list.                   Brand Name Dispense Instructions for use Diagnosis    cetirizine 10 MG tablet    zyrTEC    90 tablet    Take 1 tablet (10 mg) by mouth every evening    Seasonal allergic rhinitis, unspecified chronicity, unspecified trigger       citalopram 20 MG tablet    celeXA    90 tablet    Take 1 tablet (20 mg) by mouth daily    Major depressive disorder, recurrent episode, mild (H)       fluticasone 50 MCG/ACT spray    FLONASE    48 g    Spray 1 spray into both nostrils 2 times daily    Chronic rhinitis       IRON SUPPLEMENT PO           LORazepam 1 MG tablet    ATIVAN    20 tablet    Take 1 tablet 30 minutes prior to flight and as needed for severe anxiety.    Fear of flying       metFORMIN 500 MG tablet    GLUCOPHAGE    180 tablet    Take 1 tablet (500 mg) by mouth 2 times daily (with meals)    PCOS (polycystic ovarian syndrome)       Multi-vitamin Tabs tablet      Take 1 tablet by mouth daily        VITAMIN C PO

## 2018-05-16 NOTE — NURSING NOTE
"Chief Complaint   Patient presents with     Pre-Op Exam       Initial /88  Pulse 121  Temp 98.6  F (37  C) (Oral)  Resp 16  Wt 181 lb 9.6 oz (82.4 kg)  LMP 04/29/2018 (Exact Date)  BMI 31 kg/m2 Estimated body mass index is 31 kg/(m^2) as calculated from the following:    Height as of 7/13/17: 5' 4.17\" (1.63 m).    Weight as of this encounter: 181 lb 9.6 oz (82.4 kg).  Medication Reconciliation: complete  Hans Ortega CMA    "

## 2018-05-25 ENCOUNTER — TELEPHONE (OUTPATIENT)
Dept: OBGYN | Facility: CLINIC | Age: 43
End: 2018-05-25

## 2018-05-25 DIAGNOSIS — Z98.890 POST-OPERATIVE STATE: Primary | ICD-10-CM

## 2018-05-25 RX ORDER — ONDANSETRON 4 MG/1
4-8 TABLET, ORALLY DISINTEGRATING ORAL EVERY 8 HOURS PRN
Qty: 20 TABLET | Refills: 1 | Status: SHIPPED | OUTPATIENT
Start: 2018-05-25 | End: 2018-06-14

## 2018-06-14 ENCOUNTER — OFFICE VISIT (OUTPATIENT)
Dept: OBGYN | Facility: CLINIC | Age: 43
End: 2018-06-14
Payer: COMMERCIAL

## 2018-06-14 VITALS
WEIGHT: 181.2 LBS | HEART RATE: 130 BPM | DIASTOLIC BLOOD PRESSURE: 92 MMHG | TEMPERATURE: 97.9 F | OXYGEN SATURATION: 98 % | SYSTOLIC BLOOD PRESSURE: 154 MMHG | BODY MASS INDEX: 30.94 KG/M2

## 2018-06-14 DIAGNOSIS — Z98.890 POST-OPERATIVE STATE: Primary | ICD-10-CM

## 2018-06-14 PROCEDURE — 99024 POSTOP FOLLOW-UP VISIT: CPT | Performed by: OBSTETRICS & GYNECOLOGY

## 2018-06-14 NOTE — PATIENT INSTRUCTIONS
If you have any questions regarding your visit, Please contact your care team.    Women s Health CLINIC HOURS TELEPHONE NUMBER   MD Rissa Licona CMA Lisa -    URVASHI Ayala       Monday:       7:30-4:30 [place]  Wednesday:       7:30-4:30 [place]  Thursday:       7:30-1:30 [place]  Friday:       7:30-11:30 [facility]  [address] [address] [place]  [address]  [phone] ask for Women[facility]  [address] [place] [address]  [phone] ask for [facility]    Imaging Scheduling for [place]:  [phone]    Imaging Scheduling for [place]: [phone]       Urgent Care locations:    [facility] Saturday and Sunday   9 am - 5 pm    Monday-Friday   12 pm - 8 pm  Saturday and Sunday   9 am - 5 pm   [phone]     [facility] Labor and Delivery:  [phone]    If you need a medication refill, please contact your pharmacy. Please allow 3 business days for your refill to be completed.  As always, Thank you for trusting us with your healthcare needs!

## 2018-06-14 NOTE — PROGRESS NOTES
Ivania is a 42 year old  2 weeks s/p  TOTAL LAPAROSCOPIC HYSTERECTOMY complicated by no problems reported.  She is currently requiring Ibuprofen for pain management.  - vaginal bleeding, - hot flashes.  - GI/ complaints.  Energy level is Medium.  Denies fever.  Reviewed the pathology results Uterus, hysterectomy-intramural and submucosal leiomyomas    Right and left fallopian tubes, salpingectomy-no pathologic abnormalities  PE: There were no vitals taken for this visit.  Abd: soft, non tender, no masses  Incision: intact, no erythema, induration or discharge  Ext. Genitalia: Normal  Vagina:cuff healing, no lesions, Normal mucosa, no discharge  BME: no masses or tenderness    A/P 3 weeks s/p surgery, doing well     1. Release to normal activities, except continue pelvic rest until 6 weeks  Mauri Ramos

## 2018-06-14 NOTE — LETTER
[facility]  [address] [facility] [address] [phone]

## 2018-06-14 NOTE — MR AVS SNAPSHOT
After Visit Summary   6/14/2018    Ivania Gomez    MRN: [id number]           Patient Information     Date Of Birth          1975        Visit Information        Provider Department      6/14/2018 10:45 AM Mauri Ramos MD [facility]        Today's Diagnoses     Post-operative state    -  1      Care Instructions                                                         If you have any questions regarding your visit, Please contact your care team.    [facility] CLINIC HOURS TELEPHONE NUMBER   MD Rissa Licona CMA Lisa -    URVASHI Ayala       Monday:       7:30-4:30 [place]  Wednesday:       7:30-4:30 [place]  Thursday:       7:30-1:30 [place]  Friday:       7:30-11:30 [facility]  [address] [place]  [address] [phone] ask for [facility]  [address] [place] [address] [phone] ask for [facility]    Imaging Scheduling for [place]:  [phone]    Imaging Scheduling for [place]: [phone]       Urgent Care locations:    [facility] Saturday and Sunday   9 am - 5 pm    Monday-Friday   12 pm - 8 pm  Saturday and Sunday   9 am - 5 pm   [phone]     [facility] Labor and Delivery:  [phone]    If you need a medication refill, please contact your pharmacy. Please allow 3 business days for your refill to be completed.  As always, Thank you for trusting us with your healthcare needs!              Follow-ups after your visit        Your next 10 appointments already scheduled     Jun 19, 2018 11:00 AM CDT   Return Visit with Charlee Reaves MD   [facility]    [address] [facility] [address] [phone]              Who to contact     If you have questions or need follow up information about today's clinic visit or your schedule please  contact [facility] directly at [phone].  Normal or non-critical lab and imaging results will be communicated to you by MyChart, letter or phone within 4 business days after the clinic has received the results. If you do not hear from us within 7 days, please contact the clinic through [facility] or phone. If you have a critical or abnormal lab result, we will notify you by phone as soon as possible.  Submit refill requests through [employer] or call your pharmacy and they will forward the refill request to us. Please allow 3 business days for your refill to be completed.          Additional Information About Your Visit        [facility] Information     [employer] gives you secure access to your electronic health record. If you see a primary care provider, you can also send messages to your care team and make appointments. If you have questions, please call your primary care clinic.  If you do not have a primary care provider, please call [phone] and they will assist you.        Care EveryWhere ID     This is your Care EveryWhere ID. This could be used by other organizations to access your [place] medical records  [id number]        Your Vitals Were     Pulse Temperature Last Period Pulse Oximetry Breastfeeding? BMI (Body Mass Index)    130 97.9  F (36.6  C) (Oral) 04/29/2018 (Exact Date) 98% No 30.94 kg/m2       Blood Pressure from Last 3 Encounters:   06/14/18 (!) 154/92   05/16/18 124/80   04/30/18 (!) 146/100    Weight from Last 3 Encounters:   06/14/18 181 lb 3.2 oz (82.2 kg)   05/16/18 181 lb 9.6 oz (82.4 kg)   04/30/18 181 lb (82.1 kg)              Today, you had the following     No orders found for display         Today's Medication Changes          These changes are accurate as of 6/14/18 11:59 PM.  If you have any questions, ask your nurse or doctor.               Stop taking these medicines if you haven't already. Please contact your care team if you have questions.     IRON SUPPLEMENT PO    Stopped by:  Mauri Ramos MD           ondansetron 4 MG ODT tab   Commonly known as:  ZOFRAN ODT   Stopped by:  Mauri Ramos MD           VITAMIN C PO   Stopped by:  Mauri Ramos MD                    Primary Care Provider Office Phone # Fax #    Jacquelyn Elvia Zaldivar [phone] [address] [facility] [address]        Equal Access to Services     [facility]: Hadii aad ku hadasho Soomaali, waaxda luqadaha, qaybta kaalmada adeegyada, waxay idiin hayaan adeeg kharash la'aan . So [facility] [phone].    ATENCIÓN: Si habla español, tiene a montez disposición servicios gratuitos de asistencia lingüística. Llame al [phone].    We comply with applicable federal civil rights laws and [place] laws. We do not discriminate on the basis of race, color, national origin, age, disability, sex, sexual orientation, or gender identity.            Thank you!     Thank you for choosing [facility]  for your care. Our goal is always to provide you with excellent care. Hearing back from our patients is one way we can continue to improve our services. Please take a few minutes to complete the written survey that you may receive in the mail after your visit with us. Thank you!             Your Updated Medication List - Protect others around you: Learn how to safely use, store and throw away your medicines at www.disposemymeds.org.          This list is accurate as of 6/14/18 11:59 PM.  Always use your most recent med list.                   Brand Name Dispense Instructions for use Diagnosis    cetirizine 10 MG tablet    zyrTEC    90 tablet    Take 1 tablet (10 mg) by mouth every evening    Seasonal allergic rhinitis, unspecified chronicity, unspecified trigger       citalopram 20 MG tablet    celeXA    90 tablet    Take 1 tablet (20 mg) by mouth daily    Major depressive disorder, recurrent episode, mild (H)       fluticasone 50 MCG/ACT spray    FLONASE    48 g    Spray 1  spray into both nostrils 2 times daily    Chronic rhinitis       LORazepam 1 MG tablet    ATIVAN    20 tablet    Take 1 tablet 30 minutes prior to flight and as needed for severe anxiety.    Fear of flying       metFORMIN 500 MG tablet    GLUCOPHAGE    180 tablet    Take 1 tablet (500 mg) by mouth 2 times daily (with meals)    PCOS (polycystic ovarian syndrome)       Multi-vitamin Tabs tablet      Take 1 tablet by mouth daily

## 2018-06-19 ENCOUNTER — OFFICE VISIT (OUTPATIENT)
Dept: DERMATOLOGY | Facility: CLINIC | Age: 43
End: 2018-06-19
Payer: COMMERCIAL

## 2018-06-19 DIAGNOSIS — D22.9 MULTIPLE BENIGN NEVI: ICD-10-CM

## 2018-06-19 DIAGNOSIS — D18.01 CHERRY ANGIOMA: ICD-10-CM

## 2018-06-19 DIAGNOSIS — Z86.018 HISTORY OF DYSPLASTIC NEVUS: Primary | ICD-10-CM

## 2018-06-19 PROCEDURE — 99213 OFFICE O/P EST LOW 20 MIN: CPT | Performed by: DERMATOLOGY

## 2018-06-19 NOTE — PROGRESS NOTES
[facility] Dermatology Note      Dermatology Problem List:  1. History of dysplastic nevi  -Collision of two compound dysplastic nevi, both with mild atypia, left upper arm, s/p biopsy 5/17/2016  -Compound dysplastic melanocytic nevus, central chest, 6/14/2012    -Junctional dysplastic nevus with moderate atypia, margins narrowly free, 2010, right arm medial  -Compound dysplastic nevus with mild atypia, margins free, 2010, right arm lateral  -Compound dysplastic nevus with moderate atypia, margins free, left forearm, 2010  -Compound dysplastic nevus with mild atypia, left chest, 2010  -Compound dysplastic nevus with mild atypia, margins free, right inner arm, 2010  -Compound dysplastic nevus with moderate atypia, right ear, 2009  -Junctional dysplastic nevus with moderate atypia, right buttock, s/p excision 2009  -Junctional dysplastic nevus with moderate ayptia, negative margin, right posterior shoulder, 2009  -Inflamed junctional dysplastic nevus with moderate atypica, right superior shoulder, neg margins, 2009  -Junctional dysplastic nevus with moderate atypia, right anterior arm, 2009  -Junctional dysplastic nevus with moderate atypia, left superior shoulder, left inferior shoulder, left inner arm, 2009  -Dysplastic nevi, right chest and left breast  2.Irritated compound nevus, left buttock with mild to moderate cystologic atypia, 2007  3. Acne vulgaris  -Current Tx: Tretinoin 0.025% cream  -Previous Tx:  Differin, minocycline with dyspigmentation, doxycycline  4. Halo nevus, right neck, 2010    Encounter Date: Jun 19, 2018    CC:  Chief Complaint   Patient presents with     RECHECK     all over skin check         History of Present Illness:  Ms. Ivania Gomez is a 42 year old female with history of dysplastic nevi. Today, the patient reports there are some new spots on the leg.  She feels these are new moles. She is using sunscreen but still getting tanning.     The patient reports no other  lesions of concern.    Past Medical History:   Patient Active Problem List   Diagnosis     CARDIOVASCULAR SCREENING; LDL GOAL LESS THAN 160     Mild major depression (H)     PCOS (polycystic ovarian syndrome)     S/P laparoscopic cholecystectomy     Acne vulgaris     History of dysplastic nevus     Melanocytic nevus     History of gestational diabetes     Chronic rhinitis     Hypertriglyceridemia     Submucous leiomyoma of uterus     Excessive or frequent menstruation     Anxiety     Past Medical History:   Diagnosis Date     Acne vulgaris      Allergies      Anxiety      ASCUS on Pap smear 1994    colp benign     Depressive disorder     post partum and anxiety issues     Diabetes mellitus (H)      History of dysplastic nevus      Melanocytic nevus 6/14/2012     Obesity, unspecified      PCOS (polycystic ovarian syndrome)      Past Surgical History:   Procedure Laterality Date     BIOPSY      moles removed     CHOLECYSTECTOMY  2011     COLPOSCOPY CERVIX, BIOPSY CERVIX, ENDOCERVICAL CURETTAGE, COMBINED  1994    benign     EYE SURGERY  2009    lasix     EYE SURGERY      Lasik     GYN SURGERY  05/25/2018    hysterectomy with bilateral salpingectomy     Social History:  The patient works as an . The patient uses 1-2  alcoholic beverages per week. The patient denies use of tanning beds.  Kept in chart for convenience.       Family History:  There is no family history of melanoma.  There is no family history of asthma, eczema, psoriasis, or hay fever.   Family hx of dysplastic nevi in mother and sister.   Kept in chart for convenience.       Medications:  Current Outpatient Prescriptions   Medication Sig Dispense Refill     cetirizine (ZYRTEC) 10 MG tablet Take 1 tablet (10 mg) by mouth every evening 90 tablet 3     citalopram (CELEXA) 20 MG tablet Take 1 tablet (20 mg) by mouth daily 90 tablet 1     fluticasone (FLONASE) 50 MCG/ACT nasal spray Spray 1 spray into both nostrils 2 times daily 48 g 3      MELATONIN PO Take 5 mg by mouth       metFORMIN (GLUCOPHAGE) 500 MG tablet Take 1 tablet (500 mg) by mouth 2 times daily (with meals) 180 tablet 1     multivitamin, therapeutic with minerals (MULTI-VITAMIN) TABS Take 1 tablet by mouth daily       LORazepam (ATIVAN) 1 MG tablet Take 1 tablet 30 minutes prior to flight and as needed for severe anxiety. (Patient not taking: Reported on 6/19/2018) 20 tablet 0     Allergies   Allergen Reactions     Ibuprofen Hives and Swelling     Naproxen Hives and Swelling     Review of Systems:  -Skin: As above in HPI. No additional skin concerns.   -Const: The patient is generally feeling well today.     Physical exam:  GEN: This is a well developed, well-nourished female in no acute distress, in a pleasant mood.    SKIN: Full skin, which includes the head/face, both arms, chest, back, abdomen,both legs, genitalia and/or groin buttocks, digits and/or nails, was examined.Perianal area not examined. Nevus on groin reviewed with scribe present.   - There is no erythema, telangectasias, nodularity, or pigmentation on the sites of prior skin cancer.  - Multiple regular brown pigmented macules and papules are identified on the trunk and extremities.   - There are dome shaped bright red papules on the trunk and extremities.   - Nails are painted.   -No other lesions of concern on areas examined.     Impression/Plan:  1. History of dysplastic nevi, no clinical evidence of recurrence    Last total skin exam 5/26/2017    Recommended checking nails for streak and brown spots between nail polish changes.     ABCD's of melanoma were reviewed with patient and handout provided.     2. Gutierrez angiomas     No further intervention required   3. Multiple benign nevi    No further intervention required.      Follow-up in 1 year, earlier for new or changing lesions  Consider following up with Dr. Pooja Yeung next year as hse is the incoming new pigmented lesion MD    Staff  Involved:  Scribe/Staff    Scribe Disclosure:   I, Josefa No, am serving as a scribe to document services personally performed by Dr. Charlee Reaves, based on data collection and the provider's statements to me.       Provider Disclosure:   The documentation recorded by the scribe accurately reflects the services I personally performed and the decisions made by me.    Charlee Reaves MD    Department of Dermatology  [facility]: Phone: [phone], Fax:[phone]  [facility]: Phone: [phone], Fax: [phone]

## 2018-06-19 NOTE — PATIENT INSTRUCTIONS
Dysplastic(Atypical) Nevus     An atypical nevus or dysplastic nevus (mole) is a benign growth that may share some of the features of a melanoma, but is NOT a melanoma or any other form of cancer. The presence of an atypical nevus, however, may increase the risk of developing a melanoma, or be a marker for it. A single atypical nevus may indicate a small risk; this risk increases with the number of atypical nevi present.    What does an atypical nevus look like?  An atypical nevus can vary in appearance. Since it has the ABCDE features of a melanoma, it is important to have a dermatologist examine all moles.  A stands for ASYMMETRY; one half unlike the other half.  B stands for BORDER; irregular, scalloped or poorly defined border.  C stands for COLOR; varied from one area to another; shades of tan and brown, black; sometimes white, red or blue.  D stands for DIAMETER; while melanomas are usually greater than 6mm (the size of a pencil eraser) when diagnosed, they can be smaller.    E stands for EVOLVING; a mole or skin lesion that looks different from the rest or is changing in size, shape, or color.     What are the risks of atypical nevi?  The lifetime risk of a person in the [place] developing melanoma is 1 in 75. A patient with one to four atypical nevi without a personal or family history of melanoma is at a slightly higher risk than the general population. The risk of developing melanoma is higher if a patient with atypical nevi has a personal or family history of melanoma. A patient who has multiple atypical and normal nevi (moles) may have Familial Atypical Nevus Syndrome, and is at an increased risk for developing a melanoma, especially if a relative had melanoma.    Where and when do atypical nevi occur?  Atypical nevi begin to appear at puberty and can occur anywhere on the body, but are more common in sun-exposed areas, the back, and the legs.     Treatment  Since an atypical nevus is not  the same as a melanoma, it does not need to be treated aggressively but should be observed for changes, biopsied, or conservatively excised.    Prevention of Melanoma  Apply a broad-spectrum sunscreen which protects against ultraviolet light (UVA and UVB) and has an SPF of 30 or higher daily.    Reapply every 2 hours during prolonged sun exposure.    Seek shade whenever possible, especially during peak sun hours between 10 a.m. and 4 p.m.    Never use a tanning bed, delaney, or artificial tanning devices.    Wear a wide-brimmed hat, sunglasses, and other protective clothing like long-sleeved shirts and pants when outdoors.      To learn more about atypical nevus, call toll-free [phone]) [phone]-DERM ([phone]) to find a dermatologist in your area.    AAD Web site: www.aad.org  Toll-free: (775) 791-DERM (8835)  Images used with permission of the American Academy of Dermatology   National Library of Dermatologic Teaching Slides    2005 American Academy of Dermatology  Revised 2001, 2005  American Academy of Dermatology  [address] [facility].[address] [address]  [facility]50 - 09/05

## 2018-06-19 NOTE — MR AVS SNAPSHOT
After Visit Summary   6/19/2018    Ivania Gomez    MRN: [id number]           Patient Information     Date Of Birth          1975        Visit Information        Provider Department      6/19/2018 11:00 AM Charlee Reaves MD [facility]        Today's Diagnoses     History of dysplastic nevus    -  1    Multiple benign nevi        Cherry angioma          Care Instructions              Dysplastic(Atypical) Nevus     An atypical nevus or dysplastic nevus (mole) is a benign growth that may share some of the features of a melanoma, but is NOT a melanoma or any other form of cancer. The presence of an atypical nevus, however, may increase the risk of developing a melanoma, or be a marker for it. A single atypical nevus may indicate a small risk; this risk increases with the number of atypical nevi present.    What does an atypical nevus look like?  An atypical nevus can vary in appearance. Since it has the ABCDE features of a melanoma, it is important to have a dermatologist examine all moles.  A stands for ASYMMETRY; one half unlike the other half.  B stands for BORDER; irregular, scalloped or poorly defined border.  C stands for COLOR; varied from one area to another; shades of tan and brown, black; sometimes white, red or blue.  D stands for DIAMETER; while melanomas are usually greater than 6mm (the size of a pencil eraser) when diagnosed, they can be smaller.    E stands for EVOLVING; a mole or skin lesion that looks different from the rest or is changing in size, shape, or color.     What are the risks of atypical nevi?  The lifetime risk of a person in the [place] developing melanoma is 1 in 75. A patient with one to four atypical nevi without a personal or family history of melanoma is at a slightly higher risk than the general population. The risk of developing melanoma is higher if a patient with atypical nevi has a personal or family history of melanoma. A patient who  has multiple atypical and normal nevi (moles) may have Familial Atypical Nevus Syndrome, and is at an increased risk for developing a melanoma, especially if a relative had melanoma.    Where and when do atypical nevi occur?  Atypical nevi begin to appear at puberty and can occur anywhere on the body, but are more common in sun-exposed areas, the back, and the legs.     Treatment  Since an atypical nevus is not the same as a melanoma, it does not need to be treated aggressively but should be observed for changes, biopsied, or conservatively excised.    Prevention of Melanoma  Apply a broad-spectrum sunscreen which protects against ultraviolet light (UVA and UVB) and has an SPF of 30 or higher daily.    Reapply every 2 hours during prolonged sun exposure.    Seek shade whenever possible, especially during peak sun hours between 10 a.m. and 4 p.m.    Never use a tanning bed, delaney, or artificial tanning devices.    Wear a wide-brimmed hat, sunglasses, and other protective clothing like long-sleeved shirts and pants when outdoors.      To learn more about atypical nevus, call toll-free (747) 112-DERM (1995) to find a dermatologist in your area.    AAD Web site: www.aad.org  Toll-free: (735) 476-DERM (0195)  Images used with permission of the American Academy of Dermatology   National Library of Dermatologic Teaching Slides    2005 American Academy of Dermatology  Revised 2001, 2005  American Academy of Dermatology  [address] [address]  [facility] - 09/05            Follow-ups after your visit        Follow-up notes from your care team     Return in about 1 year (around 6/19/2019) for with myself or dr Kam for ayptical nevi.      Who to contact     If you have questions or need follow up information about today's clinic visit or your schedule please contact [facility] directly at [phone].  Normal or non-critical lab and imaging results will be  communicated to you by [employer], letter or phone within 4 business days after the clinic has received the results. If you do not hear from us within 7 days, please contact the clinic through [employer] or phone. If you have a critical or abnormal lab result, we will notify you by phone as soon as possible.  Submit refill requests through [employer] or call your pharmacy and they will forward the refill request to us. Please allow 3 business days for your refill to be completed.          Additional Information About Your Visit        [employer] Information     [employer] gives you secure access to your electronic health record. If you see a primary care provider, you can also send messages to your care team and make appointments. If you have questions, please call your primary care clinic.  If you do not have a primary care provider, please call [phone] and they will assist you.      [employer] is an electronic gateway that provides easy, online access to your medical records. With [employer], you can request a clinic appointment, read your test results, renew a prescription or communicate with your care team.     To access your existing account, please contact your [facility] or call [phone] for assistance.        Care EveryWhere ID     This is your Care EveryWhere ID. This could be used by other organizations to access your [facility] medical records  [id number]        Your Vitals Were     Last Period                   04/29/2018 (Exact Date)            Blood Pressure from Last 3 Encounters:   06/14/18 (!) 154/92   05/16/18 124/80   04/30/18 (!) 146/100    Weight from Last 3 Encounters:   06/14/18 82.2 kg (181 lb 3.2 oz)   05/16/18 82.4 kg (181 lb 9.6 oz)   04/30/18 82.1 kg (181 lb)              Today, you had the following     No orders found for display       Primary Care Provider Office Phone # Fax #    Jacquelyn Zaldivar [phone] [address] [facility]  [id number]        Equal Access to Services     [facility]: Hadii levi saenz sukumar Hernandez, wanuhada luqadaha, qaybta rebekahsuadlynn soares. So [facility] [phone].    ATENCIÓN: Si habla español, tiene a montez disposición servicios gratuitos de asistencia lingüística. Natalia al [phone].    We comply with applicable federal civil rights laws and [place] laws. We do not discriminate on the basis of race, color, national origin, age, disability, sex, sexual orientation, or gender identity.            Thank you!     Thank you for choosing [facility]  for your care. Our goal is always to provide you with excellent care. Hearing back from our patients is one way we can continue to improve our services. Please take a few minutes to complete the written survey that you may receive in the mail after your visit with us. Thank you!             Your Updated Medication List - Protect others around you: Learn how to safely use, store and throw away your medicines at www.disposemymeds.org.          This list is accurate as of 6/19/18 11:37 AM.  Always use your most recent med list.                   Brand Name Dispense Instructions for use Diagnosis    cetirizine 10 MG tablet    zyrTEC    90 tablet    Take 1 tablet (10 mg) by mouth every evening    Seasonal allergic rhinitis, unspecified chronicity, unspecified trigger       citalopram 20 MG tablet    celeXA    90 tablet    Take 1 tablet (20 mg) by mouth daily    Major depressive disorder, recurrent episode, mild (H)       fluticasone 50 MCG/ACT spray    FLONASE    48 g    Spray 1 spray into both nostrils 2 times daily    Chronic rhinitis       LORazepam 1 MG tablet    ATIVAN    20 tablet    Take 1 tablet 30 minutes prior to flight and as needed for severe anxiety.    Fear of flying       MELATONIN PO      Take 5 mg by mouth        metFORMIN 500 MG tablet    GLUCOPHAGE    180 tablet    Take 1 tablet (500 mg) by mouth 2 times  daily (with meals)    PCOS (polycystic ovarian syndrome)       Multi-vitamin Tabs tablet      Take 1 tablet by mouth daily

## 2018-06-19 NOTE — NURSING NOTE
Dermatology Rooming Note    Ivania Gomez's goals for this visit include:   Chief Complaint   Patient presents with     RECHECK     all over skin check       Is a scribe okay for this visit:YES,     Are records needed for this visit(If yes, obtain release of information): No,      Vitals: LMP 04/29/2018 (Exact Date)    Referring Provider:  ESTABLISHED PATIENT  No address on file

## 2018-06-19 NOTE — LETTER
6/19/2018         RE: Ivania Gomez  [address] [facility] [address]        Dear Colleague,    Thank you for referring your patient, Ivania Gomez, to the [facility]. Please see a copy of my visit note below.    [facility] Dermatology Note      Dermatology Problem List:  1. History of dysplastic nevi  -Collision of two compound dysplastic nevi, both with mild atypia, left upper arm, s/p biopsy 5/17/2016  -Compound dysplastic melanocytic nevus, central chest, 6/14/2012    -Junctional dysplastic nevus with moderate atypia, margins narrowly free, 2010, right arm medial  -Compound dysplastic nevus with mild atypia, margins free, 2010, right arm lateral  -Compound dysplastic nevus with moderate atypia, margins free, left forearm, 2010  -Compound dysplastic nevus with mild atypia, left chest, 2010  -Compound dysplastic nevus with mild atypia, margins free, right inner arm, 2010  -Compound dysplastic nevus with moderate atypia, right ear, 2009  -Junctional dysplastic nevus with moderate atypia, right buttock, s/p excision 2009  -Junctional dysplastic nevus with moderate ayptia, negative margin, right posterior shoulder, 2009  -Inflamed junctional dysplastic nevus with moderate atypica, right superior shoulder, neg margins, 2009  -Junctional dysplastic nevus with moderate atypia, right anterior arm, 2009  -Junctional dysplastic nevus with moderate atypia, left superior shoulder, left inferior shoulder, left inner arm, 2009  -Dysplastic nevi, right chest and left breast  2.Irritated compound nevus, left buttock with mild to moderate cystologic atypia, 2007  3. Acne vulgaris  -Current Tx: Tretinoin 0.025% cream  -Previous Tx:  Differin, minocycline with dyspigmentation, doxycycline  4. Halo nevus, right neck, 2010    Encounter Date: Jun 19, 2018    CC:  Chief Complaint   Patient presents with     RECHECK     all over skin check         History of Present Illness:  Ms.  Ivania Gomez is a 42 year old female with history of dysplastic nevi. Today, the patient reports there are some new spots on the leg.  She feels these are new moles. She is using sunscreen but still getting tanning.     The patient reports no other lesions of concern.    Past Medical History:   Patient Active Problem List   Diagnosis     CARDIOVASCULAR SCREENING; LDL GOAL LESS THAN 160     Mild major depression (H)     PCOS (polycystic ovarian syndrome)     S/P laparoscopic cholecystectomy     Acne vulgaris     History of dysplastic nevus     Melanocytic nevus     History of gestational diabetes     Chronic rhinitis     Hypertriglyceridemia     Submucous leiomyoma of uterus     Excessive or frequent menstruation     Anxiety     Past Medical History:   Diagnosis Date     Acne vulgaris      Allergies      Anxiety      ASCUS on Pap smear 1994    colp benign     Depressive disorder     post partum and anxiety issues     Diabetes mellitus (H)      History of dysplastic nevus      Melanocytic nevus 6/14/2012     Obesity, unspecified      PCOS (polycystic ovarian syndrome)      Past Surgical History:   Procedure Laterality Date     BIOPSY      moles removed     CHOLECYSTECTOMY  2011     COLPOSCOPY CERVIX, BIOPSY CERVIX, ENDOCERVICAL CURETTAGE, COMBINED  1994    benign     EYE SURGERY  2009    lasix     EYE SURGERY      Lasik     GYN SURGERY  05/25/2018    hysterectomy with bilateral salpingectomy     Social History:  The patient works as an . The patient uses 1-2  alcoholic beverages per week. The patient denies use of tanning beds.  Kept in chart for convenience.       Family History:  There is no family history of melanoma.  There is no family history of asthma, eczema, psoriasis, or hay fever.   Family hx of dysplastic nevi in mother and sister.   Kept in chart for convenience.       Medications:  Current Outpatient Prescriptions   Medication Sig Dispense Refill     cetirizine (ZYRTEC) 10 MG tablet Take  1 tablet (10 mg) by mouth every evening 90 tablet 3     citalopram (CELEXA) 20 MG tablet Take 1 tablet (20 mg) by mouth daily 90 tablet 1     fluticasone (FLONASE) 50 MCG/ACT nasal spray Spray 1 spray into both nostrils 2 times daily 48 g 3     MELATONIN PO Take 5 mg by mouth       metFORMIN (GLUCOPHAGE) 500 MG tablet Take 1 tablet (500 mg) by mouth 2 times daily (with meals) 180 tablet 1     multivitamin, therapeutic with minerals (MULTI-VITAMIN) TABS Take 1 tablet by mouth daily       LORazepam (ATIVAN) 1 MG tablet Take 1 tablet 30 minutes prior to flight and as needed for severe anxiety. (Patient not taking: Reported on 6/19/2018) 20 tablet 0     Allergies   Allergen Reactions     Ibuprofen Hives and Swelling     Naproxen Hives and Swelling     Review of Systems:  -Skin: As above in HPI. No additional skin concerns.   -Const: The patient is generally feeling well today.     Physical exam:  GEN: This is a well developed, well-nourished female in no acute distress, in a pleasant mood.    SKIN: Full skin, which includes the head/face, both arms, chest, back, abdomen,both legs, genitalia and/or groin buttocks, digits and/or nails, was examined.Perianal area not examined. Nevus on groin reviewed with scribe present.   - There is no erythema, telangectasias, nodularity, or pigmentation on the sites of prior skin cancer.  - Multiple regular brown pigmented macules and papules are identified on the trunk and extremities.   - There are dome shaped bright red papules on the trunk and extremities.   - Nails are painted.   -No other lesions of concern on areas examined.     Impression/Plan:  1. History of dysplastic nevi, no clinical evidence of recurrence    Last total skin exam 5/26/2017    Recommended checking nails for streak and brown spots between nail polish changes.     ABCD's of melanoma were reviewed with patient and handout provided.     2. Gutierrez angiomas     No further intervention required   3. Multiple benign  nevi    No further intervention required.      Follow-up in 1 year, earlier for new or changing lesions  Consider following up with Dr. Pooja Yeung next year as hse is the incoming new pigmented lesion MD    Staff Involved:  Scribe/Staff    Scribe Disclosure:   I, Josefa Sarabia, am serving as a scribe to document services personally performed by Dr. Charlee Reaves, based on data collection and the provider's statements to me.       Provider Disclosure:   The documentation recorded by the scribe accurately reflects the services I personally performed and the decisions made by me.    Charlee Reaves MD    Department of Dermatology  [facility]: Phone: [phone], Fax:[phone]  [facility]: Phone: [phone], Fax: [phone]        Again, thank you for allowing me to participate in the care of your patient.        Sincerely,        Charlee Reaves MD

## 2018-06-20 DIAGNOSIS — J31.0 CHRONIC RHINITIS: ICD-10-CM

## 2018-06-20 RX ORDER — FLUTICASONE PROPIONATE 50 MCG
SPRAY, SUSPENSION (ML) NASAL
Qty: 48 ML | Refills: 3 | Status: SHIPPED | OUTPATIENT
Start: 2018-06-20 | End: 2018-09-13

## 2018-08-01 DIAGNOSIS — F33.0 MAJOR DEPRESSIVE DISORDER, RECURRENT EPISODE, MILD (H): ICD-10-CM

## 2018-08-01 RX ORDER — CITALOPRAM HYDROBROMIDE 20 MG/1
TABLET ORAL
Qty: 90 TABLET | Refills: 0 | Status: SHIPPED | OUTPATIENT
Start: 2018-08-01 | End: 2018-09-13

## 2018-08-01 NOTE — LETTER
August 1, 2018    Ivania Gomez  [address] [facility] [place] [address]    Dear Ivania,       We recently received a refill request for citalopram (CELEXA) 20 MG tablet.  We have refilled this for a one time 90 day supply only because you are due for a:    Depression office visit      Please call at your earliest convenience so that there will not be a delay with your future refills.          Thank you,   Your [facility] [place] Team/  [phone]

## 2018-08-01 NOTE — TELEPHONE ENCOUNTER
Medication is being filled for 1 time refill only due to:  Patient needs to be seen for fasting lab appointment and appointment with the provider for further refills.  Annual exam.  Glenda DALEYN, RN

## 2018-09-13 ENCOUNTER — RADIANT APPOINTMENT (OUTPATIENT)
Dept: GENERAL RADIOLOGY | Facility: CLINIC | Age: 43
End: 2018-09-13
Attending: ORTHOPAEDIC SURGERY
Payer: COMMERCIAL

## 2018-09-13 ENCOUNTER — E-VISIT (OUTPATIENT)
Dept: FAMILY MEDICINE | Facility: CLINIC | Age: 43
End: 2018-09-13
Payer: COMMERCIAL

## 2018-09-13 ENCOUNTER — OFFICE VISIT (OUTPATIENT)
Dept: ORTHOPEDICS | Facility: CLINIC | Age: 43
End: 2018-09-13
Payer: COMMERCIAL

## 2018-09-13 VITALS — HEART RATE: 117 BPM | OXYGEN SATURATION: 98 % | DIASTOLIC BLOOD PRESSURE: 104 MMHG | SYSTOLIC BLOOD PRESSURE: 156 MMHG

## 2018-09-13 DIAGNOSIS — M25.561 MEDIAL KNEE PAIN, RIGHT: Primary | ICD-10-CM

## 2018-09-13 DIAGNOSIS — E28.2 PCOS (POLYCYSTIC OVARIAN SYNDROME): ICD-10-CM

## 2018-09-13 DIAGNOSIS — J31.0 CHRONIC RHINITIS, UNSPECIFIED TYPE: ICD-10-CM

## 2018-09-13 DIAGNOSIS — M22.41 CHONDROMALACIA, PATELLA, RIGHT: ICD-10-CM

## 2018-09-13 DIAGNOSIS — J30.2 SEASONAL ALLERGIC RHINITIS, UNSPECIFIED CHRONICITY, UNSPECIFIED TRIGGER: ICD-10-CM

## 2018-09-13 DIAGNOSIS — F33.0 MAJOR DEPRESSIVE DISORDER, RECURRENT EPISODE, MILD (H): ICD-10-CM

## 2018-09-13 DIAGNOSIS — M25.561 MEDIAL KNEE PAIN, RIGHT: ICD-10-CM

## 2018-09-13 PROCEDURE — 99444 ZZC PHYSICIAN ONLINE EVALUATION & MANAGEMENT SERVICE: CPT | Performed by: FAMILY MEDICINE

## 2018-09-13 PROCEDURE — 99204 OFFICE O/P NEW MOD 45 MIN: CPT | Performed by: ORTHOPAEDIC SURGERY

## 2018-09-13 PROCEDURE — 73562 X-RAY EXAM OF KNEE 3: CPT | Mod: RT

## 2018-09-13 RX ORDER — CETIRIZINE HYDROCHLORIDE 10 MG/1
10 TABLET ORAL EVERY EVENING
Qty: 90 TABLET | Refills: 3 | Status: SHIPPED | OUTPATIENT
Start: 2018-09-13 | End: 2019-03-27

## 2018-09-13 RX ORDER — FLUTICASONE PROPIONATE 50 MCG
SPRAY, SUSPENSION (ML) NASAL
Qty: 48 ML | Refills: 3 | Status: SHIPPED | OUTPATIENT
Start: 2018-09-13 | End: 2023-02-08

## 2018-09-13 RX ORDER — CITALOPRAM HYDROBROMIDE 20 MG/1
20 TABLET ORAL DAILY
Qty: 90 TABLET | Refills: 1 | Status: SHIPPED | OUTPATIENT
Start: 2018-09-13 | End: 2019-03-27

## 2018-09-13 NOTE — MR AVS SNAPSHOT
After Visit Summary   9/13/2018    Ivania Gomez    MRN: [id number]           Patient Information     Date Of Birth          1975        Visit Information        Provider Department      9/13/2018 10:30 AM Ceasar Mccarty MD [facility]        Today's Diagnoses     Medial knee pain, right    -  1    Chondromalacia, patella, right          Care Instructions    Ivania Gomez was evaluated in a specialty clinic today at which time her blood pressure was noted to be elevated.  She  has been advised to follow up with her primary provider or with a nurse only visit.           Follow-ups after your visit        Your next 10 appointments already scheduled     Jun 04, 2019 10:45 AM CDT   [facility]    [address] [place] [id number] [phone]              Future tests that were ordered for you today     Open Future Orders        Priority Expected Expires Ordered    MR Knee Right w/o Contrast Routine 9/13/2018 9/13/2019 9/13/2018            Who to contact     If you have questions or need follow up information about today's clinic visit or your schedule please contact [facility] directly at [phone].  Normal or non-critical lab and imaging results will be communicated to you by MyChart, letter or phone within 4 business days after the clinic has received the results. If you do not hear from us within 7 days, please contact the clinic through MyChart or phone. If you have a critical or abnormal lab result, we will notify you by phone as soon as possible.  Submit refill requests through [employer] or call your pharmacy and they will forward the refill request to us. Please allow 3 business days for your refill to be completed.          Additional Information About Your Visit        [employer] Information     [employer] gives you secure access to your electronic health record. If  you see a primary care provider, you can also send messages to your care team and make appointments. If you have questions, please call your primary care clinic.  If you do not have a primary care provider, please call [phone] and they will assist you.        Care EveryWhere ID     This is your Care EveryWhere ID. This could be used by other organizations to access your [facility] medical records  [id number]        Your Vitals Were     Pulse Last Period Pulse Oximetry             117 04/29/2018 (Exact Date) 98%          Blood Pressure from Last 3 Encounters:   09/13/18 (!) 156/104   06/14/18 (!) 154/92   05/16/18 124/80    Weight from Last 3 Encounters:   06/14/18 82.2 kg (181 lb 3.2 oz)   05/16/18 82.4 kg (181 lb 9.6 oz)   04/30/18 82.1 kg (181 lb)                 Today's Medication Changes          These changes are accurate as of 9/13/18 11:33 AM.  If you have any questions, ask your nurse or doctor.               These medicines have changed or have updated prescriptions.        Dose/Directions    citalopram 20 MG tablet   Commonly known as:  celeXA   This may have changed:  Another medication with the same name was removed. Continue taking this medication, and follow the directions you see here.   Used for:  Major depressive disorder, recurrent episode, mild (H)   Changed by:  Ceasar Mccarty MD        TAKE 1 TABLET BY MOUTH EVERY DAY   Quantity:  90 tablet   Refills:  0                Primary Care Provider Office Phone # Fax #    Jacquelyn Elvia Zaldivar [phone] [address] [address] [address]        Equal Access to Services     [facility] AH: Hadii levi saenz hadasho Socarlyle, waaxda luqadaha, qaybta kaalmalynn soares. So [place] [phone].    ATENCIÓN: Si habla español, tiene a montez disposición servicios gratuitos de asistencia lingüística. Llame al [phone].    We comply with applicable federal civil rights laws and [place]  laws. We do not discriminate on the basis of race, color, national origin, age, disability, sex, sexual orientation, or gender identity.            Thank you!     [facility] you for [facility]  [facility]. Our goal is always to provide you with excellent care. Hearing back from our patients is one way we can continue to improve our services. Please take a few minutes to complete the written survey that you may receive in the mail after your visit with us. Thank you!             Your Updated Medication List - Protect others around you: Learn how to safely use, store and throw away your medicines at www.disposemymeds.org.          This list is accurate as of 9/13/18 11:33 AM.  Always use your most recent med list.                   Brand Name Dispense Instructions for use Diagnosis    cetirizine 10 MG tablet    zyrTEC    90 tablet    Take 1 tablet (10 mg) by mouth every evening    Seasonal allergic rhinitis, unspecified chronicity, unspecified trigger       citalopram 20 MG tablet    celeXA    90 tablet    TAKE 1 TABLET BY MOUTH EVERY DAY    Major depressive disorder, recurrent episode, mild (H)       fluticasone 50 MCG/ACT spray    FLONASE    48 mL    SPRAY 1 SPRAY INTO BOTH NOSTRILS 2 TIMES DAILY    Chronic rhinitis       LORazepam 1 MG tablet    ATIVAN    20 tablet    Take 1 tablet 30 minutes prior to flight and as needed for severe anxiety.    Fear of flying       MELATONIN PO      Take 5 mg by mouth        metFORMIN 500 MG tablet    GLUCOPHAGE    180 tablet    Take 1 tablet (500 mg) by mouth 2 times daily (with meals)    PCOS (polycystic ovarian syndrome)       Multi-vitamin Tabs tablet      Take 1 tablet by mouth daily

## 2018-09-13 NOTE — PATIENT INSTRUCTIONS
Ivania TRACIE Jason was evaluated in a specialty clinic today at which time her blood pressure was noted to be elevated.  She  has been advised to follow up with her primary provider or with a nurse only visit.

## 2018-09-13 NOTE — LETTER
9/13/2018         RE: Ivania Gomez  [address] [facility] [place] [address]        Dear Colleague,    Thank you for referring your patient, Ivania Gomez, to the [facility]. Please see a copy of my visit note below.    SUBJECTIVE:   Ivania Gomez is a 43 year old female who is seen as self referral for evaluation of right knee pain that has been present for many years. She had several minor injuries in high school basketball and she tried some physical therapy. Since then, she had minor problems up until the last couple years when she is unable to run or even walk fast. She can jog for a few yards but an occasional feeling of giving way when decelerating.      Present symptoms: anteromedial knee pain, clicking/popping, and feeling of giving way. No swelling or positional night pain.  Symptoms occur when: stepping into truck, kneeling, and getting up from a squatted position    Treatments tried to this point: Acetaminophen, icing, and some exercises. The patient has an allergy to Ibuprofen.    Review of Systems:  Constitutional:  NEGATIVE for fever, chills, change in weight  Integumentary/Skin:  NEGATIVE for worrisome rashes, moles or lesions  Eyes:  NEGATIVE for vision changes or irritation  ENT/Mouth:  NEGATIVE for ear, mouth and throat problems  Resp:  NEGATIVE for significant cough or SOB  Breast:  NEGATIVE for masses, tenderness or discharge  CV:  NEGATIVE for chest pain, palpitations or peripheral edema  GI:  NEGATIVE for nausea, abdominal pain, heartburn, or change in bowel habits  :  Negative   Musculoskeletal:  See HPI above  Neuro:  NEGATIVE for weakness, dizziness or paresthesias  Endocrine:  NEGATIVE for temperature intolerance, skin/hair changes  Heme/allergy/immune:  NEGATIVE for bleeding problems  Psychiatric:  NEGATIVE for changes in mood or affect    Past Medical History:   Past Medical History:   Diagnosis Date     Acne vulgaris      Allergies      Anxiety      ASCUS on  Pap smear 1994    colp benign     Depressive disorder     post partum and anxiety issues     Diabetes mellitus (H)      History of dysplastic nevus      Melanocytic nevus 6/14/2012     Obesity, unspecified      PCOS (polycystic ovarian syndrome)      Past Surgical History:   Past Surgical History:   Procedure Laterality Date     BIOPSY      moles removed     CHOLECYSTECTOMY  2011     COLPOSCOPY CERVIX, BIOPSY CERVIX, ENDOCERVICAL CURETTAGE, COMBINED  1994    benign     EYE SURGERY  2009    lasix     EYE SURGERY      Lasik     GYN SURGERY  05/25/2018    hysterectomy with bilateral salpingectomy     Family History:   Family History   Problem Relation Age of Onset     Cancer Mother      vulvar sarcoma     Hypertension Mother      Other Cancer Mother      vulvar sarcoma     Thyroid Disease Mother      Prostate Cancer Paternal Grandfather      Hypertension Maternal Grandmother      Cancer Maternal Grandfather      lung     Hyperlipidemia Brother      Thyroid Disease Sister      Anxiety Disorder Sister      Thyroid Disease Sister      Social History:   Social History   Substance Use Topics     Smoking status: Never Smoker     Smokeless tobacco: Never Used      Comment: nonsmoking household     Alcohol use Yes      Comment: occasional   Occupation:  at [employer]    OBJECTIVE:  Physical Exam:  BP (!) 156/104  Pulse 117  LMP 04/29/2018 (Exact Date)  SpO2 98%  General Appearance: healthy, alert and no distress   Skin: no suspicious lesions or rashes  Neuro: Normal strength and tone, mentation intact and speech normal  Vascular: good pulses, and cappillary refill   Lymph: no lymphadenopathy   Psych:  mentation appears normal and affect normal/bright  Resp: no increased work of breathing     Right Knee Exam:  Gait: walks with normal gait  Alignment: normal   Squat: 90 % limited by pain.    Patellofemoral joint: significant crepitations in the patellofemoral joint  Effusion: minimal, if any  ROM:  0-145 degrees   Tender: medial joint line and medal facet of the patella  Ligaments:   Lachman's: stable   Anterior/Posterior drawer: stable   Varus/Valgus stress: stable to varus and valgus stress  McMurrays: negative  Quads: mild atrophy in the quads on the right side  No pain with hip ROM     X-rays:  Obtained today, 09/13/18, of the RIGHT KNEE: 3-views, reviewed in the office with the patient by myself today and show minimal degenerative changes in the patellofemoral joint. There is a small lateral osteophyte off the patella. Good medial and lateral joint spaces without osteophytes. There is also a little subchondral sclerosis in both medial and lateral tibial compartments.      ASSESSMENT:   1. Chondromalacia of patella, right knee  2. Possible medial meniscus tear, right knee    PLAN:   Even though the patient doesn't have any definite locking, we will get an MRI of the right knee. The patient should follow up for her results. In the meantime, the patient should work on strengthening the muscles.     THIAGO Mccarty MD  Dept. Orthopedic Surgery  [facility]     This document serves as a record of the services and decisions personally performed and made by Dr. THIAGO Mccarty MD. It was created on his behalf by Bal Taylor, a trained medical scribe. The creation of this record is based on the provider's personal observations and the statements of the patient. This document has been checked and approved by the attending provider.   Bal Taylor September 13, 2018 10:57 AM    Again, thank you for allowing me to participate in the care of your patient.        Sincerely,        Ceasar Mccarty MD

## 2018-09-13 NOTE — PROGRESS NOTES
SUBJECTIVE:   Ivania Gomez is a 43 year old female who is seen as self referral for evaluation of right knee pain that has been present for many years. She had several minor injuries in high school basketball and she tried some physical therapy. Since then, she had minor problems up until the last couple years when she is unable to run or even walk fast. She can jog for a few yards but an occasional feeling of giving way when decelerating.      Present symptoms: anteromedial knee pain, clicking/popping, and feeling of giving way. No swelling or positional night pain.  Symptoms occur when: stepping into truck, kneeling, and getting up from a squatted position    Treatments tried to this point: Acetaminophen, icing, and some exercises. The patient has an allergy to Ibuprofen.    Review of Systems:  Constitutional:  NEGATIVE for fever, chills, change in weight  Integumentary/Skin:  NEGATIVE for worrisome rashes, moles or lesions  Eyes:  NEGATIVE for vision changes or irritation  ENT/Mouth:  NEGATIVE for ear, mouth and throat problems  Resp:  NEGATIVE for significant cough or SOB  Breast:  NEGATIVE for masses, tenderness or discharge  CV:  NEGATIVE for chest pain, palpitations or peripheral edema  GI:  NEGATIVE for nausea, abdominal pain, heartburn, or change in bowel habits  :  Negative   Musculoskeletal:  See HPI above  Neuro:  NEGATIVE for weakness, dizziness or paresthesias  Endocrine:  NEGATIVE for temperature intolerance, skin/hair changes  Heme/allergy/immune:  NEGATIVE for bleeding problems  Psychiatric:  NEGATIVE for changes in mood or affect    Past Medical History:   Past Medical History:   Diagnosis Date     Acne vulgaris      Allergies      Anxiety      ASCUS on Pap smear 1994    colp benign     Depressive disorder     post partum and anxiety issues     Diabetes mellitus (H)      History of dysplastic nevus      Melanocytic nevus 6/14/2012     Obesity, unspecified      PCOS (polycystic ovarian syndrome)       Past Surgical History:   Past Surgical History:   Procedure Laterality Date     BIOPSY      moles removed     CHOLECYSTECTOMY  2011     COLPOSCOPY CERVIX, BIOPSY CERVIX, ENDOCERVICAL CURETTAGE, COMBINED  1994    benign     EYE SURGERY  2009    lasix     EYE SURGERY      Lasik     GYN SURGERY  05/25/2018    hysterectomy with bilateral salpingectomy     Family History:   Family History   Problem Relation Age of Onset     Cancer Mother      vulvar sarcoma     Hypertension Mother      Other Cancer Mother      vulvar sarcoma     Thyroid Disease Mother      Prostate Cancer Paternal Grandfather      Hypertension Maternal Grandmother      Cancer Maternal Grandfather      lung     Hyperlipidemia Brother      Thyroid Disease Sister      Anxiety Disorder Sister      Thyroid Disease Sister      Social History:   Social History   Substance Use Topics     Smoking status: Never Smoker     Smokeless tobacco: Never Used      Comment: nonsmoking household     Alcohol use Yes      Comment: occasional   Occupation:  at [employer]    OBJECTIVE:  Physical Exam:  BP (!) 156/104  Pulse 117  LMP 04/29/2018 (Exact Date)  SpO2 98%  General Appearance: healthy, alert and no distress   Skin: no suspicious lesions or rashes  Neuro: Normal strength and tone, mentation intact and speech normal  Vascular: good pulses, and cappillary refill   Lymph: no lymphadenopathy   Psych:  mentation appears normal and affect normal/bright  Resp: no increased work of breathing     Right Knee Exam:  Gait: walks with normal gait  Alignment: normal   Squat: 90 % limited by pain.    Patellofemoral joint: significant crepitations in the patellofemoral joint  Effusion: minimal, if any  ROM: 0-145 degrees   Tender: medial joint line and medal facet of the patella  Ligaments:   Lachman's: stable   Anterior/Posterior drawer: stable   Varus/Valgus stress: stable to varus and valgus stress  McMurrays: negative  Quads: mild atrophy in the  quads on the right side  No pain with hip ROM     X-rays:  Obtained today, 09/13/18, of the RIGHT KNEE: 3-views, reviewed in the office with the patient by myself today and show minimal degenerative changes in the patellofemoral joint. There is a small lateral osteophyte off the patella. Good medial and lateral joint spaces without osteophytes. There is also a little subchondral sclerosis in both medial and lateral tibial compartments.      ASSESSMENT:   1. Chondromalacia of patella, right knee  2. Possible medial meniscus tear, right knee    PLAN:   Even though the patient doesn't have any definite locking, we will get an MRI of the right knee. The patient should follow up for her results. In the meantime, the patient should work on strengthening the muscles.     THIAGO Mccarty MD  Dept. Orthopedic Surgery  [facility]     This document serves as a record of the services and decisions personally performed and made by Dr. THIAGO Mccarty MD. It was created on his behalf by Bal Taylor, a trained medical scribe. The creation of this record is based on the provider's personal observations and the statements of the patient. This document has been checked and approved by the attending provider.   Bal Taylor September 13, 2018 10:57 AM

## 2018-09-21 ENCOUNTER — RADIANT APPOINTMENT (OUTPATIENT)
Dept: MRI IMAGING | Facility: CLINIC | Age: 43
End: 2018-09-21
Attending: ORTHOPAEDIC SURGERY
Payer: COMMERCIAL

## 2018-09-21 DIAGNOSIS — M25.561 MEDIAL KNEE PAIN, RIGHT: ICD-10-CM

## 2018-09-21 PROCEDURE — 73721 MRI JNT OF LWR EXTRE W/O DYE: CPT | Mod: TC

## 2018-10-08 ENCOUNTER — TRANSFERRED RECORDS (OUTPATIENT)
Dept: HEALTH INFORMATION MANAGEMENT | Facility: CLINIC | Age: 43
End: 2018-10-08

## 2018-10-18 ENCOUNTER — OFFICE VISIT (OUTPATIENT)
Dept: ORTHOPEDICS | Facility: CLINIC | Age: 43
End: 2018-10-18
Payer: COMMERCIAL

## 2018-10-18 VITALS — SYSTOLIC BLOOD PRESSURE: 140 MMHG | OXYGEN SATURATION: 96 % | HEART RATE: 100 BPM | DIASTOLIC BLOOD PRESSURE: 89 MMHG

## 2018-10-18 DIAGNOSIS — E88.89 HOFFA'S FAT PAD DISEASE (H): Primary | ICD-10-CM

## 2018-10-18 PROCEDURE — 99213 OFFICE O/P EST LOW 20 MIN: CPT | Performed by: ORTHOPAEDIC SURGERY

## 2018-10-18 ASSESSMENT — PAIN SCALES - GENERAL: PAINLEVEL: MODERATE PAIN (4)

## 2018-10-18 NOTE — LETTER
10/18/2018         RE: Ivania Gomez  [address] [facility] [place] [address]        Dear Colleague,    Thank you for referring your patient, Ivania Gomez, to the [facility]. Please see a copy of my visit note below.    SUBJECTIVE:  Ivania Gomez returns for MRI results from 09/21/18 of the RIGHT KNEE, which are reviewed with the patient by myself and showed:  1. Patellar chondromalacia.  2. Hoffa's fat pad edema. This can be seen in association with fat pad impingement syndrome and patellofemoral maltracking.    Review of Systems:  Constitutional/General: Negative for fever, chills, change in weight  Integumentary/Skin: Negative for worrisome rashes, moles, or lesions  Neuro: Negative for weakness, dizziness, or paresthesias   Psychiatric: negative for changes in mood or affect    OBJECTIVE:  Physical Exam:  /89 (BP Location: Right arm, Patient Position: Sitting, Cuff Size: Adult Regular)  Pulse 100  LMP 04/29/2018 (Exact Date)  SpO2 96%  General Appearance: healthy, alert and no distress   Skin: no suspicious lesions or rashes  Neuro: Normal strength and tone, mentation intact and speech normal  Vascular: good pulses, and cappillary refill   Lymph: no lymphadenopathy   Psych:  mentation appears normal and affect normal/bright  Resp: no increased work of breathing    Right Knee Exam:  Effusion: none  Non-tender: over fat pad  Tender: mild over lateral facet of the patella  Lateral retinaculum: mildly tight    ASSESSMENT:   1. Fat pad edema, right knee  2. Patellar chondromalacia, right knee    PLAN:  I discussed the findings with the patient. We talked about the options: physical therapy, bracing, steroid injection, The patient will start with physical therapy. Other treatments such as McConnel taping and chopart straps may or may not help. We will hold off on a steroid injection at this time. She is unable to take NSAIDs due to allergy. Follow up as needed.    Total time spent  was 15 minutes; with 15 minutes spent face-to-face with patient dedicated to education, counseling and a development of a treatment plan.    THIAGO Mccarty MD  Dept. Orthopedic Surgery  [facility]    This document serves as a record of the services and decisions personally performed and made by Dr. THIAGO Mccarty MD. It was created on his behalf by Bal Taylor, a trained medical scribe. The creation of this record is based on the provider's personal observations and the statements of the patient. This document has been checked and approved by the attending provider.   Bal Taylor October 18, 2018 1:50 PM    Again, thank you for allowing me to participate in the care of your patient.        Sincerely,        Ceaasr Mccarty MD

## 2018-10-18 NOTE — MR AVS SNAPSHOT
After Visit Summary   10/18/2018    Ivania Gomez    MRN: [id number]           Patient Information     Date Of Birth          1975        Visit Information        Provider Department      10/18/2018 1:15 PM Ceasar Mccarty MD [facility]         Follow-ups after your visit        Your next 10 appointments already scheduled     Jun 04, 2019 10:45 AM CDT   Return Visit with Charlee Reaves MD   [facility] ([facility])    [id number] [place] [address] [phone]              Who to contact     If you have questions or need follow up information about today's clinic visit or your schedule please contact [facility] directly at [phone].  Normal or non-critical lab and imaging results will be communicated to you by MyChart, letter or phone within 4 business days after the clinic has received the results. If you do not hear from us within 7 days, please contact the clinic through MyChart or phone. If you have a critical or abnormal lab result, we will notify you by phone as soon as possible.  Submit refill requests through [employer] or call your pharmacy and they will forward the refill request to us. Please allow 3 business days for your refill to be completed.          Additional Information About Your Visit        MyChart Information     [employer] gives you secure access to your electronic health record. If you see a primary care provider, you can also send messages to your care team and make appointments. If you have questions, please call your primary care clinic.  If you do not have a primary care provider, please call [phone] and they will assist you.        Care EveryWhere ID     This is your Care EveryWhere ID. This could be used by other organizations to access your [facility] medical records  [id number]        Your Vitals Were     Pulse Last Period Pulse Oximetry             100 04/29/2018 (Exact Date)  96%          Blood Pressure from Last 3 Encounters:   10/18/18 140/89   09/13/18 (!) 156/104   06/14/18 (!) 154/92    Weight from Last 3 Encounters:   06/14/18 82.2 kg (181 lb 3.2 oz)   05/16/18 82.4 kg (181 lb 9.6 oz)   04/30/18 82.1 kg (181 lb)              Today, you had the following     No orders found for display       Primary Care Provider Office Phone # Fax #    Jacquelyn Zaldviar [phone] [address] [facility] [address]        Equal Access to Services     [facility]: Hadii levi saenz hadkito Socarlyle, waaxda luqadaha, qaybta kaalmada ademaximinoyada, lynn dunn . So [facility] [phone].    ATENCIÓN: Si habla español, tiene a montez disposición servicios gratuitos de asistencia lingüística. [facility] [phone].    We comply with applicable federal civil rights laws and [place] laws. We do not discriminate on the basis of race, color, national origin, age, disability, sex, sexual orientation, or gender identity.            Thank you!     Thank you for choosing [facility]  for your care. Our goal is always to provide you with excellent care. Hearing back from our patients is one way we can continue to improve our services. Please take a few minutes to complete the written survey that you may receive in the mail after your visit with us. Thank you!             Your Updated Medication List - Protect others around you: Learn how to safely use, store and throw away your medicines at www.disposemymeds.org.          This list is accurate as of 10/18/18  1:17 PM.  Always use your most recent med list.                   Brand Name Dispense Instructions for use Diagnosis    cetirizine 10 MG tablet    zyrTEC    90 tablet    Take 1 tablet (10 mg) by mouth every evening    Seasonal allergic rhinitis, unspecified chronicity, unspecified trigger       citalopram 20 MG tablet    celeXA    90 tablet    Take 1 tablet (20 mg) by mouth daily    Major  depressive disorder, recurrent episode, mild (H)       fluticasone 50 MCG/ACT spray    FLONASE    48 mL    SPRAY 1 SPRAY INTO BOTH NOSTRILS 2 TIMES DAILY    Chronic rhinitis, unspecified type       LORazepam 1 MG tablet    ATIVAN    20 tablet    Take 1 tablet 30 minutes prior to flight and as needed for severe anxiety.    Fear of flying       MELATONIN PO      Take 5 mg by mouth        metFORMIN 500 MG tablet    GLUCOPHAGE    180 tablet    Take 1 tablet (500 mg) by mouth 2 times daily (with meals)    PCOS (polycystic ovarian syndrome)       Multi-vitamin Tabs tablet      Take 1 tablet by mouth daily

## 2018-10-18 NOTE — PROGRESS NOTES
SUBJECTIVE:  Ivania Gomez returns for MRI results from 09/21/18 of the RIGHT KNEE, which are reviewed with the patient by myself and showed:  1. Patellar chondromalacia.  2. Hoffa's fat pad edema. This can be seen in association with fat pad impingement syndrome and patellofemoral maltracking.    Review of Systems:  Constitutional/General: Negative for fever, chills, change in weight  Integumentary/Skin: Negative for worrisome rashes, moles, or lesions  Neuro: Negative for weakness, dizziness, or paresthesias   Psychiatric: negative for changes in mood or affect    OBJECTIVE:  Physical Exam:  /89 (BP Location: Right arm, Patient Position: Sitting, Cuff Size: Adult Regular)  Pulse 100  LMP 04/29/2018 (Exact Date)  SpO2 96%  General Appearance: healthy, alert and no distress   Skin: no suspicious lesions or rashes  Neuro: Normal strength and tone, mentation intact and speech normal  Vascular: good pulses, and cappillary refill   Lymph: no lymphadenopathy   Psych:  mentation appears normal and affect normal/bright  Resp: no increased work of breathing    Right Knee Exam:  Effusion: none  Non-tender: over fat pad  Tender: mild over lateral facet of the patella  Lateral retinaculum: mildly tight    ASSESSMENT:   1. Fat pad edema, right knee  2. Patellar chondromalacia, right knee    PLAN:  I discussed the findings with the patient. We talked about the options: physical therapy, bracing, steroid injection, The patient will start with physical therapy. Other treatments such as McConnel taping and chopart straps may or may not help. We will hold off on a steroid injection at this time. She is unable to take NSAIDs due to allergy. Follow up as needed.    Total time spent was 15 minutes; with 15 minutes spent face-to-face with patient dedicated to education, counseling and a development of a treatment plan.    THIAGO Mccarty MD  Dept. Orthopedic Surgery  [facility]    This document serves as a  record of the services and decisions personally performed and made by Dr. THIAGO Mccarty MD. It was created on his behalf by Bal Tyalor, a trained medical scribe. The creation of this record is based on the provider's personal observations and the statements of the patient. This document has been checked and approved by the attending provider.   Bal Taylor October 18, 2018 1:50 PM

## 2019-03-04 ENCOUNTER — TELEPHONE (OUTPATIENT)
Dept: FAMILY MEDICINE | Facility: CLINIC | Age: 44
End: 2019-03-04

## 2019-03-04 DIAGNOSIS — E28.2 PCOS (POLYCYSTIC OVARIAN SYNDROME): ICD-10-CM

## 2019-03-04 NOTE — TELEPHONE ENCOUNTER
"Requested Prescriptions   Pending Prescriptions Disp Refills     metFORMIN (GLUCOPHAGE) 500 MG tablet [Pharmacy Med Name: METFORMIN  MG TABLET] 180 tablet 1     Sig: TAKE 1 TABLET (500 MG) BY MOUTH 2 TIMES DAILY (WITH MEALS)    Biguanide Agents Failed - 3/4/2019  1:09 AM       Failed - Blood pressure less than 140/90 in past 6 months    BP Readings from Last 3 Encounters:   10/18/18 140/89   09/13/18 (!) 156/104   06/14/18 (!) 154/92                Failed - Patient has not had a positive pregnancy test within the past 12 mos.        Passed - Patient is age 10 or older       Passed - Recent (12 mo) or future (30 days) visit within the authorizing provider's specialty     Patient had office visit in the last 12 months or has a visit in the next 30 days with authorizing provider or within the authorizing provider's specialty.  See \"Patient Info\" tab in inbasket, or \"Choose Columns\" in Meds & Orders section of the refill encounter.             Passed - Patient does NOT have a diagnosis of CHF.       Passed - Medication is active on med list       Passed - Patient is not pregnant          "

## 2019-03-04 NOTE — TELEPHONE ENCOUNTER
LM needs an appointment for [facility] for refill of medication. Can have refill after Apt. Is made.  AZ Olivares

## 2019-03-12 NOTE — TELEPHONE ENCOUNTER
RN please refill Metformin until seen, patient scheduled physical with PCP 03/27/19  AZ Olivares

## 2019-03-20 ENCOUNTER — DOCUMENTATION ONLY (OUTPATIENT)
Dept: LAB | Facility: CLINIC | Age: 44
End: 2019-03-20

## 2019-03-20 DIAGNOSIS — E28.2 PCOS (POLYCYSTIC OVARIAN SYNDROME): ICD-10-CM

## 2019-03-20 DIAGNOSIS — Z13.6 CARDIOVASCULAR SCREENING; LDL GOAL LESS THAN 160: Primary | ICD-10-CM

## 2019-03-20 DIAGNOSIS — Z13.6 CARDIOVASCULAR SCREENING; LDL GOAL LESS THAN 160: ICD-10-CM

## 2019-03-20 LAB
ANION GAP SERPL CALCULATED.3IONS-SCNC: 9 MMOL/L (ref 3–14)
BUN SERPL-MCNC: 7 MG/DL (ref 7–30)
CALCIUM SERPL-MCNC: 9.2 MG/DL (ref 8.5–10.1)
CHLORIDE SERPL-SCNC: 108 MMOL/L (ref 94–109)
CO2 SERPL-SCNC: 23 MMOL/L (ref 20–32)
CREAT SERPL-MCNC: 0.79 MG/DL (ref 0.52–1.04)
GFR SERPL CREATININE-BSD FRML MDRD: >90 ML/MIN/{1.73_M2}
GLUCOSE SERPL-MCNC: 91 MG/DL (ref 70–99)
POTASSIUM SERPL-SCNC: 4 MMOL/L (ref 3.4–5.3)
SODIUM SERPL-SCNC: 140 MMOL/L (ref 133–144)

## 2019-03-20 PROCEDURE — 36415 COLL VENOUS BLD VENIPUNCTURE: CPT | Performed by: FAMILY MEDICINE

## 2019-03-20 PROCEDURE — 80048 BASIC METABOLIC PNL TOTAL CA: CPT | Performed by: FAMILY MEDICINE

## 2019-03-20 PROCEDURE — 80061 LIPID PANEL: CPT | Performed by: FAMILY MEDICINE

## 2019-03-20 NOTE — PROGRESS NOTES
This patient was here today for fasting labs and only had a cbasic ordered. She looks like she may be due for a lipid panel as well. Please review and order a Future lipid panel if needed as we are holding blood.    Thank you, Sharonda Melendrez MLT

## 2019-03-20 NOTE — PROGRESS NOTES
"   SUBJECTIVE:   CC: Ivania Gomez is an 43 year old woman who presents for preventive health visit.     Healthy Habits:    Do you get at least three servings of calcium containing foods daily (dairy, green leafy vegetables, etc.)? { :242467::\"yes\"}    Amount of exercise or daily activities, outside of work: { :747583}    Problems taking medications regularly { :084460::\"No\"}    Medication side effects: { :798183::\"No\"}    Have you had an eye exam in the past two years? { :546542}    Do you see a dentist twice per year? { :283244}    Do you have sleep apnea, excessive snoring or daytime drowsiness?{ :299867}  {Outside tests to abstract? :222055}    {additional problems to add (Optional):279996}    Today's PHQ-2 Score:   PHQ-2 ( 1999 Pfizer) 7/10/2017 6/20/2016   Q1: Little interest or pleasure in doing things 0 -   Q2: Feeling down, depressed or hopeless 0 -   PHQ-2 Score 0 -   Q1: Little interest or pleasure in doing things Not at all Not at all   Q2: Feeling down, depressed or hopeless Not at all Not at all   PHQ-2 Score 0 0     {PHQ-2 LOOK IN ASSESSMENTS (Optional) :922699}  Abuse: Current or Past(Physical, Sexual or Emotional)- {YES/NO/NA:039735}  Do you feel safe in your environment? {YES/NO/NA:912564}    Social History     Tobacco Use     Smoking status: Never Smoker     Smokeless tobacco: Never Used     Tobacco comment: nonsmoking household   Substance Use Topics     Alcohol use: Yes     Comment: occasional     If you drink alcohol do you typically have >3 drinks per day or >7 drinks per week? {ETOH :443288}                     Reviewed orders with patient.  Reviewed health maintenance and updated orders accordingly - {Yes/No:032360::\"Yes\"}  {Chronicprobdata (Optional):626799}    {Mammo Decision Support (Optional):139766}    Pertinent mammograms are reviewed under the imaging tab.  History of abnormal Pap smear: {PAP HX:255054}  PAP / HPV Latest Ref Rng & Units 7/13/2017 4/11/2014 9/7/2011   PAP - NIL NIL NIL " "  HPV 16 DNA NEG Negative - -   HPV 18 DNA NEG Negative - -   OTHER HR HPV NEG Negative - -     Reviewed and updated as needed this visit by clinical staff         Reviewed and updated as needed this visit by Provider        {HISTORY OPTIONS (Optional):332813}    ROS:  { :351624}    OBJECTIVE:   LMP 04/29/2018 (Exact Date)   EXAM:  {Exam Choices:635699}    {Diagnostic Test Results (Optional):626433::\"Diagnostic Test Results:\",\"none \"}    ASSESSMENT/PLAN:   {Diag Picklist:412427}    COUNSELING:   {FEMALE COUNSELING MESSAGES:841693::\"Reviewed preventive health counseling, as reflected in patient instructions\"}    BP Readings from Last 1 Encounters:   10/18/18 140/89     Estimated body mass index is 30.94 kg/m  as calculated from the following:    Height as of 7/13/17: 1.63 m (5' 4.17\").    Weight as of 6/14/18: 82.2 kg (181 lb 3.2 oz).    {BP Counseling- Complete if BP >= 120/80  (Optional):167109}  {Weight Management Plan (ACO) Complete if BMI is abnormal-  Ages 18-64  BMI >24.9.  Age 65+ with BMI <23 or >30 (Optional):933191}     reports that  has never smoked. she has never used smokeless tobacco.  {Tobacco Cessation -- Complete if patient is a smoker (Optional):963677}    Counseling Resources:  ATP IV Guidelines  Pooled Cohorts Equation Calculator  Breast Cancer Risk Calculator  FRAX Risk Assessment  ICSI Preventive Guidelines  Dietary Guidelines for Americans, 2010  [employer]'s MyPlate  ASA Prophylaxis  Lung CA Screening    Jacquelyn Zaldivar MD  [facility]  "

## 2019-03-21 LAB
CHOLEST SERPL-MCNC: 169 MG/DL
HDLC SERPL-MCNC: 40 MG/DL
LDLC SERPL CALC-MCNC: 104 MG/DL
NONHDLC SERPL-MCNC: 129 MG/DL
TRIGL SERPL-MCNC: 124 MG/DL

## 2019-03-22 NOTE — RESULT ENCOUNTER NOTE
Ivania,  Here are your lab results.  We will discuss these at your upcoming visit.   See you soon.  Jacquelyn Zaldivar MD

## 2019-03-26 ASSESSMENT — ENCOUNTER SYMPTOMS
HEADACHES: 0
FEVER: 0
HEMATURIA: 0
DIARRHEA: 0
WEAKNESS: 0
FREQUENCY: 0
NAUSEA: 0
SORE THROAT: 0
ARTHRALGIAS: 0
HEARTBURN: 0
JOINT SWELLING: 0
DIZZINESS: 0
CHILLS: 0
NERVOUS/ANXIOUS: 0
DYSURIA: 0
BREAST MASS: 0
HEMATOCHEZIA: 0
SHORTNESS OF BREATH: 0
COUGH: 0
CONSTIPATION: 0
PALPITATIONS: 0
MYALGIAS: 0
ABDOMINAL PAIN: 0
PARESTHESIAS: 0
EYE PAIN: 0

## 2019-03-26 NOTE — PROGRESS NOTES
SUBJECTIVE:   CC: Ivania Gomez is an 43 year old woman who presents for preventive health visit.     Physical   Annual:     Getting at least 3 servings of Calcium per day:  Yes    Bi-annual eye exam:  NO    Dental care twice a year:  Yes    Sleep apnea or symptoms of sleep apnea:  None    Diet:  Regular (no restrictions)    Frequency of exercise:  2-3 days/week    Duration of exercise:  30-45 minutes    Taking medications regularly:  Yes    Medication side effects:  None    Additional concerns today:  No    PHQ-2 Total Score: 0              Today's PHQ-2 Score:   PHQ-2 ( 1999 Pfizer) 3/26/2019   Q1: Little interest or pleasure in doing things 0   Q2: Feeling down, depressed or hopeless 0   PHQ-2 Score 0   Q1: Little interest or pleasure in doing things Not at all   Q2: Feeling down, depressed or hopeless Not at all   PHQ-2 Score 0       Abuse: Current or Past(Physical, Sexual or Emotional)- No  Do you feel safe in your environment? Yes    Social History     Tobacco Use     Smoking status: Never Smoker     Smokeless tobacco: Never Used     Tobacco comment: nonsmoking household   Substance Use Topics     Alcohol use: Yes     Comment: occasional     Alcohol Use 3/26/2019   If you drink alcohol do you typically have greater than 3 drinks per day OR greater than 7 drinks per week? No   No flowsheet data found.    Reviewed orders with patient.  Reviewed health maintenance and updated orders accordingly - Yes    G 2 P 2   Patient's last menstrual period was 04/29/2018 (exact date).     Fasting: No   Td: tdap 2012       Last 3 Pap and HPV Results:   PAP / HPV Latest Ref Rng & Units 7/13/2017 4/11/2014 9/7/2011   PAP - NIL NIL NIL   HPV 16 DNA NEG Negative - -   HPV 18 DNA NEG Negative - -   OTHER HR HPV NEG Negative - -                  Cholesterol:   Lab Results   Component Value Date    CHOL 169 03/20/2019     Lab Results   Component Value Date    HDL 40 03/20/2019     Lab Results   Component Value Date      2019     Lab Results   Component Value Date    TRIG 124 2019     Lab Results   Component Value Date    CHOLHDLRATIO 5.2 2015         MM/18  Dexa:  NA     Flex/colo: NA      Seat Belt: Yes    Sunscreen use: Yes   Calcium Intake: adeq  Health Care Directive: Yes   Sexually Active: Yes     Current contraception: hysterectomy  History of abnormal Pap smear: No  Family history of colon/breast/ovarian cancer: No  Regular self breast exam: Yes  History of abnormal mammogram: No      Labs reviewed in EPIC  BP Readings from Last 3 Encounters:   19 138/84   10/18/18 140/89   18 (!) 156/104    Wt Readings from Last 3 Encounters:   19 82.5 kg (181 lb 12.8 oz)   18 82.2 kg (181 lb 3.2 oz)   18 82.4 kg (181 lb 9.6 oz)                  Patient Active Problem List   Diagnosis     CARDIOVASCULAR SCREENING; LDL GOAL LESS THAN 160     Mild major depression (H)     PCOS (polycystic ovarian syndrome)     S/P laparoscopic cholecystectomy     Acne vulgaris     History of dysplastic nevus     Melanocytic nevus     History of gestational diabetes     Chronic rhinitis     Hypertriglyceridemia     Submucous leiomyoma of uterus     Excessive or frequent menstruation     Anxiety     Past Surgical History:   Procedure Laterality Date     BIOPSY      moles removed     CHOLECYSTECTOMY       COLPOSCOPY CERVIX, BIOPSY CERVIX, ENDOCERVICAL CURETTAGE, COMBINED      benign     EYE SURGERY      lasix     EYE SURGERY      Lasik     HYSTERECTOMY, PAP NO LONGER INDICATED  2018    hysterectomy with bilateral salpingectomy, ovaries remain       Social History     Tobacco Use     Smoking status: Never Smoker     Smokeless tobacco: Never Used     Tobacco comment: nonsmoking household   Substance Use Topics     Alcohol use: Yes     Comment: occasional     Family History   Problem Relation Age of Onset     Cancer Mother         vulvar sarcoma     Hypertension Mother      Other Cancer Mother          vulvar sarcoma     Thyroid Disease Mother      Prostate Cancer Paternal Grandfather      Hypertension Maternal Grandmother      Cancer Maternal Grandfather         lung     Hyperlipidemia Brother      Thyroid Disease Sister      Anxiety Disorder Sister      Thyroid Disease Sister          Current Outpatient Medications   Medication Sig Dispense Refill     cetirizine (ZYRTEC) 10 MG tablet Take 1 tablet (10 mg) by mouth every evening 90 tablet 3     citalopram (CELEXA) 20 MG tablet Take 1 tablet (20 mg) by mouth daily 90 tablet 1     fluticasone (FLONASE) 50 MCG/ACT spray SPRAY 1 SPRAY INTO BOTH NOSTRILS 2 TIMES DAILY 48 mL 3     LORazepam (ATIVAN) 1 MG tablet Take 1 tablet 30 minutes prior to flight and as needed for severe anxiety. 10 tablet 0     MELATONIN PO Take 5 mg by mouth       metFORMIN (GLUCOPHAGE) 500 MG tablet TAKE 1 TABLET (500 MG) BY MOUTH 2 TIMES DAILY (WITH MEALS) 180 tablet 1     multivitamin, therapeutic with minerals (MULTI-VITAMIN) TABS Take 1 tablet by mouth daily         Mammogram Screening: Patient under age 50, mutual decision reflected in health maintenance.      Pertinent mammograms are reviewed under the imaging tab.  Reviewed and updated as needed this visit by clinical staff  Tobacco  Allergies  Meds  Problems  Med Hx  Surg Hx  Fam Hx  Soc Hx          Reviewed and updated as needed this visit by Provider  Tobacco  Allergies  Meds  Problems  Med Hx  Surg Hx  Fam Hx            Review of Systems   Constitutional: Negative for chills and fever.   HENT: Negative for congestion, ear pain, hearing loss and sore throat.    Eyes: Negative for pain and visual disturbance.   Respiratory: Negative for cough and shortness of breath.    Cardiovascular: Negative for chest pain, palpitations and peripheral edema.   Gastrointestinal: Negative for abdominal pain, constipation, diarrhea, heartburn, hematochezia and nausea.   Breasts:  Negative for tenderness, breast mass and discharge.  "  Genitourinary: Negative for dysuria, frequency, genital sores, hematuria, pelvic pain, urgency, vaginal bleeding and vaginal discharge.   Musculoskeletal: Negative for arthralgias, joint swelling and myalgias.   Skin: Negative for rash.   Neurological: Negative for dizziness, weakness, headaches and paresthesias.   Psychiatric/Behavioral: Negative for mood changes. The patient is not nervous/anxious.           OBJECTIVE:   /84   Pulse 111   Temp 98.4  F (36.9  C) (Oral)   Resp 18   Ht 1.626 m (5' 4\")   Wt 82.5 kg (181 lb 12.8 oz)   LMP 04/29/2018 (Exact Date)   BMI 31.21 kg/m    Physical Exam  GENERAL: healthy, alert and no distress  EYES: Eyes grossly normal to inspection, PERRL and conjunctivae and sclerae normal  HENT: ear canals and TM's normal, nose and mouth without ulcers or lesions  NECK: no adenopathy, no asymmetry, masses, or scars and thyroid normal to palpation  RESP: lungs clear to auscultation - no rales, rhonchi or wheezes  BREAST: normal without masses, tenderness or nipple discharge and no palpable axillary masses or adenopathy  CV: regular rate and rhythm, normal S1 S2, no S3 or S4, no murmur, click or rub, no peripheral edema and peripheral pulses strong  ABDOMEN: soft, nontender, no hepatosplenomegaly, no masses and bowel sounds normal  MS: no gross musculoskeletal defects noted, no edema  SKIN: no suspicious lesions or rashes  NEURO: Normal strength and tone, mentation intact and speech normal  PSYCH: mentation appears normal, affect normal/bright    Diagnostic Test Results:  none     ASSESSMENT/PLAN:   (Z00.00) Routine general medical examination at a health care facility  (primary encounter diagnosis)  Comment: preventive needs reviewed  Plan: see orders in Epic.     (F33.0) Major depressive disorder, recurrent episode, mild (H)  Comment: stable,   Plan: citalopram (CELEXA) 20 MG tablet, OFFICE/OUTPT         VISIT,EST,LEVL II         Refill x 6 months     (F40.243) Fear of " "flying  Comment: has trip coming up   Plan: LORazepam (ATIVAN) 1 MG tablet, OFFICE/OUTPT         VISIT,EST,LEVL II        10 tabs given    (J30.2) Seasonal allergic rhinitis, unspecified trigger  Comment: worsening  Plan: cetirizine-pseudoePHEDrine ER (ZYRTEC-D ALLERGY        & CONGESTION) 5-120 MG 12 hr tablet,         OFFICE/OUTPT VISIT,EST,LEVL II        Refill x 1 yr consider adding singulair, pt on flonase  May need to avoid sudafed due to bp.      COUNSELING:  Reviewed preventive health counseling, as reflected in patient instructions  Special attention given to:        Regular exercise       Healthy diet/nutrition    BP Readings from Last 1 Encounters:   03/27/19 138/84     Estimated body mass index is 31.21 kg/m  as calculated from the following:    Height as of this encounter: 1.626 m (5' 4\").    Weight as of this encounter: 82.5 kg (181 lb 12.8 oz).    BP Screening:   Last 3 BP Readings:    BP Readings from Last 3 Encounters:   03/27/19 138/84   10/18/18 140/89   09/13/18 (!) 156/104       The following was recommended to the patient:  Re-screen within 4 weeks and recommend lifestyle modifications  Weight management plan: Discussed healthy diet and exercise guidelines     reports that  has never smoked. she has never used smokeless tobacco.      Counseling Resources:  ATP IV Guidelines  Pooled Cohorts Equation Calculator  Breast Cancer Risk Calculator  FRAX Risk Assessment  ICSI Preventive Guidelines  Dietary Guidelines for Americans, 2010  USDA's MyPlate  ASA Prophylaxis  Lung CA Screening    Jacquelyn Zaldivar MD  [employer]  "

## 2019-03-27 ENCOUNTER — OFFICE VISIT (OUTPATIENT)
Dept: FAMILY MEDICINE | Facility: CLINIC | Age: 44
End: 2019-03-27
Payer: COMMERCIAL

## 2019-03-27 VITALS
DIASTOLIC BLOOD PRESSURE: 84 MMHG | TEMPERATURE: 98.4 F | HEART RATE: 111 BPM | BODY MASS INDEX: 31.04 KG/M2 | HEIGHT: 64 IN | SYSTOLIC BLOOD PRESSURE: 138 MMHG | WEIGHT: 181.8 LBS | RESPIRATION RATE: 18 BRPM

## 2019-03-27 DIAGNOSIS — F40.243 FEAR OF FLYING: ICD-10-CM

## 2019-03-27 DIAGNOSIS — J30.2 SEASONAL ALLERGIC RHINITIS, UNSPECIFIED TRIGGER: ICD-10-CM

## 2019-03-27 DIAGNOSIS — F33.0 MAJOR DEPRESSIVE DISORDER, RECURRENT EPISODE, MILD (H): ICD-10-CM

## 2019-03-27 DIAGNOSIS — Z00.00 ROUTINE GENERAL MEDICAL EXAMINATION AT A HEALTH CARE FACILITY: Primary | ICD-10-CM

## 2019-03-27 PROCEDURE — 99213 OFFICE O/P EST LOW 20 MIN: CPT | Mod: 25 | Performed by: FAMILY MEDICINE

## 2019-03-27 PROCEDURE — 99396 PREV VISIT EST AGE 40-64: CPT | Performed by: FAMILY MEDICINE

## 2019-03-27 RX ORDER — LORAZEPAM 1 MG/1
TABLET ORAL
Qty: 10 TABLET | Refills: 0 | Status: SHIPPED | OUTPATIENT
Start: 2019-03-27

## 2019-03-27 RX ORDER — CETIRIZINE HYDROCHLORIDE, PSEUDOEPHEDRINE HYDROCHLORIDE 5; 120 MG/1; MG/1
1 TABLET, FILM COATED, EXTENDED RELEASE ORAL 2 TIMES DAILY
Qty: 180 TABLET | Refills: 3 | Status: SHIPPED | OUTPATIENT
Start: 2019-03-27 | End: 2020-03-24

## 2019-03-27 RX ORDER — CITALOPRAM HYDROBROMIDE 20 MG/1
20 TABLET ORAL DAILY
Qty: 90 TABLET | Refills: 1 | Status: SHIPPED | OUTPATIENT
Start: 2019-03-27 | End: 2019-09-30

## 2019-03-27 ASSESSMENT — MIFFLIN-ST. JEOR: SCORE: 1464.64

## 2019-03-27 ASSESSMENT — PATIENT HEALTH QUESTIONNAIRE - PHQ9: SUM OF ALL RESPONSES TO PHQ QUESTIONS 1-9: 0

## 2019-03-27 NOTE — NURSING NOTE
"Chief Complaint   Patient presents with     Physical     update surgical history to reflect hysterectomy     Health Maintenance     phq-9       Initial /88   Pulse 111   Temp 98.4  F (36.9  C) (Oral)   Resp 18   Ht 1.626 m (5' 4\")   Wt 82.5 kg (181 lb 12.8 oz)   LMP 04/29/2018 (Exact Date)   BMI 31.21 kg/m   Estimated body mass index is 31.21 kg/m  as calculated from the following:    Height as of this encounter: 1.626 m (5' 4\").    Weight as of this encounter: 82.5 kg (181 lb 12.8 oz).  Medication Reconciliation: complete  Hans Ortega CMA    "

## 2019-06-11 ENCOUNTER — OFFICE VISIT (OUTPATIENT)
Dept: DERMATOLOGY | Facility: CLINIC | Age: 44
End: 2019-06-11
Payer: COMMERCIAL

## 2019-06-11 DIAGNOSIS — D22.9 MULTIPLE BENIGN NEVI: ICD-10-CM

## 2019-06-11 DIAGNOSIS — Z86.018 HISTORY OF DYSPLASTIC NEVUS: ICD-10-CM

## 2019-06-11 DIAGNOSIS — D48.5 NEOPLASM OF UNCERTAIN BEHAVIOR OF SKIN: Primary | ICD-10-CM

## 2019-06-11 PROCEDURE — 11102 TANGNTL BX SKIN SINGLE LES: CPT | Performed by: DERMATOLOGY

## 2019-06-11 PROCEDURE — 99213 OFFICE O/P EST LOW 20 MIN: CPT | Mod: 25 | Performed by: DERMATOLOGY

## 2019-06-11 PROCEDURE — 88305 TISSUE EXAM BY PATHOLOGIST: CPT | Mod: TC | Performed by: DERMATOLOGY

## 2019-06-11 ASSESSMENT — PAIN SCALES - GENERAL: PAINLEVEL: NO PAIN (0)

## 2019-06-11 NOTE — NURSING NOTE
Ivania Gomez's goals for this visit include:   Chief Complaint   Patient presents with     Skin Check     no areas of concern hx DN       She requests these members of her care team be copied on today's visit information:     PCP: Jacquelyn Zaldivar    Referring Provider:  ESTABLISHED PATIENT  No address on file    LMP 04/29/2018 (Exact Date)     Do you need any medication refills at today's visit? Ariadne Gray LPN

## 2019-06-11 NOTE — PATIENT INSTRUCTIONS

## 2019-06-11 NOTE — PROGRESS NOTES
[facility] Dermatology Note      Dermatology Problem List:  1. History of dysplastic nevi  -Collision of two compound dysplastic nevi, both with mild atypia, left upper arm, s/p biopsy 5/17/2016  -Compound dysplastic melanocytic nevus, central chest, 6/14/2012    -Junctional dysplastic nevus with moderate atypia, margins narrowly free, 2010, right arm medial  -Compound dysplastic nevus with mild atypia, margins free, 2010, right arm lateral  -Compound dysplastic nevus with moderate atypia, margins free, left forearm, 2010  -Compound dysplastic nevus with mild atypia, left chest, 2010  -Compound dysplastic nevus with mild atypia, margins free, right inner arm, 2010  -Compound dysplastic nevus with moderate atypia, right ear, 2009  -Junctional dysplastic nevus with moderate atypia, right buttock, s/p excision 2009  -Junctional dysplastic nevus with moderate ayptia, negative margin, right posterior shoulder, 2009  -Inflamed junctional dysplastic nevus with moderate atypica, right superior shoulder, neg margins, 2009  -Junctional dysplastic nevus with moderate atypia, right anterior arm, 2009  -Junctional dysplastic nevus with moderate atypia, left superior shoulder, left inferior shoulder, left inner arm, 2009  -Dysplastic nevi, right chest and left breast  2.Irritated compound nevus, left buttock with mild to moderate cystologic atypia, 2007  3. Acne vulgaris  -Current Tx: Tretinoin 0.025% cream  -Previous Tx:  Differin, minocycline with dyspigmentation, doxycycline  4. Halo nevus, right neck, 2010    Encounter Date: Jun 11, 2019    CC:  Chief Complaint   Patient presents with     Skin Check     no areas of concern hx DN         History of Present Illness:  Ms. Ivania Gomez is a 43 year old female with history of dysplastic nevi who presents for a skin check. She was last seen on 6/19/18 when no evidence of DN was found. Today the patient reports of a new spot on the left knee. Also a lesion on  the right thigh. No other concerns.    Past Medical History:   Patient Active Problem List   Diagnosis     CARDIOVASCULAR SCREENING; LDL GOAL LESS THAN 160     Mild major depression (H)     PCOS (polycystic ovarian syndrome)     S/P laparoscopic cholecystectomy     Acne vulgaris     History of dysplastic nevus     Melanocytic nevus     History of gestational diabetes     Chronic rhinitis     Hypertriglyceridemia     Submucous leiomyoma of uterus     Excessive or frequent menstruation     Anxiety     Past Medical History:   Diagnosis Date     Acne vulgaris      Allergies      Anxiety      ASCUS on Pap smear 1994    colp benign     Depressive disorder     post partum and anxiety issues     Diabetes mellitus (H)      History of dysplastic nevus      Hoffa's fat pad disease (H)      Melanocytic nevus 6/14/2012     Obesity, unspecified      PCOS (polycystic ovarian syndrome)      Past Surgical History:   Procedure Laterality Date     BIOPSY      moles removed     CHOLECYSTECTOMY  2011     COLPOSCOPY CERVIX, BIOPSY CERVIX, ENDOCERVICAL CURETTAGE, COMBINED  1994    benign     EYE SURGERY  2009    lasix     EYE SURGERY      Lasik     HYSTERECTOMY, PAP NO LONGER INDICATED  05/25/2018    hysterectomy with bilateral salpingectomy, ovaries remain     Social History:  The patient works as an . The patient uses 1-2  alcoholic beverages per week. The patient denies use of tanning beds.  Kept in chart for convenience.       Family History:  There is no family history of melanoma.  There is no family history of asthma, eczema, psoriasis, or hay fever.   Family hx of dysplastic nevi in mother and sister.   Kept in chart for convenience.       Medications:  Current Outpatient Medications   Medication Sig Dispense Refill     cetirizine-pseudoePHEDrine ER (ZYRTEC-D ALLERGY & CONGESTION) 5-120 MG 12 hr tablet Take 1 tablet by mouth 2 times daily 180 tablet 3     citalopram (CELEXA) 20 MG tablet Take 1 tablet (20 mg) by  mouth daily 90 tablet 1     fluticasone (FLONASE) 50 MCG/ACT spray SPRAY 1 SPRAY INTO BOTH NOSTRILS 2 TIMES DAILY 48 mL 3     LORazepam (ATIVAN) 1 MG tablet Take 1 tablet 30 minutes prior to flight and as needed for severe anxiety. 10 tablet 0     MELATONIN PO Take 5 mg by mouth       metFORMIN (GLUCOPHAGE) 500 MG tablet TAKE 1 TABLET (500 MG) BY MOUTH 2 TIMES DAILY (WITH MEALS) 180 tablet 1     multivitamin, therapeutic with minerals (MULTI-VITAMIN) TABS Take 1 tablet by mouth daily       Allergies   Allergen Reactions     Ibuprofen Hives and Swelling     Naproxen Hives and Swelling     Review of Systems:  -Skin: As above in HPI. No additional skin concerns.   -Const: The patient is generally feeling well today.     Physical exam:  GEN: This is a well developed, well-nourished female in no acute distress, in a pleasant mood.    SKIN: Total skin excluding the undergarment areas was performed. The exam included the head/face, neck, both arms, chest, back, abdomen, both legs, digits and/or nails.   - Nails are painted.   - Multiple regular brown pigmented macules and papules are identified on the trunk and extremities.   - 1 mm pigmented macule on the left knee - regular and homogenous  - 2 mm violaceous papule on the right meidal thigh   - No other lesions of concern on areas examined     Impression/Plan:  1.  History of dysplastic nevi, no clinical evidence of recurrence    Last total skin exam 6/11/2019    Recommended checking nails for streak and brown spots between nail polish changes.     Recommend sunscreens SPF #30 or greater, protective clothing and avoidance of tanning beds.    ABCD's of melanoma were reviewed with patient and handout provided.     2.  Maule, left knee, DDx consitent with a nevus     Recheck at next visit given new     3.  NUB, right medial thigh, DDx thombrust cherry angioma    Shave biopsy:  After discussion of benefits and risks including but not limited to bleeding/bruising,  pain/swelling, infection, scar, incomplete removal, nerve damage/numbness, recurrence, and non-diagnostic biopsy, written consent, verbal consent and photographs were obtained. Time-out was performed. The area was cleaned with isopropyl alcohol. 0.5mL of 1% lidocaine with epinephrine was injected to obtain adequate anesthesia of the lesion on the right medial thigh. A shave biopsy was performed. Hemostasis was achieved with aluminium chloride. Vaseline and a sterile dressing were applied. The patient tolerated the procedure and no complications were noted. The patient was provided with verbal and written post care instructions.     4.  Multiple clinically benign nevi    No further intervention required.      Follow-up in 4 months, recheck knee- Dr. Thornton, confirm lesion on knee not evolving, appears benign today    Staff Involved:  Scribe/Staff    Scribe Disclosure  I, Mariama Enrique, am serving as a scribe to document services personally performed by Dr. Charlee Reaves MD, based on data collection and the provider's statements to me.     Provider Disclosure:   The documentation recorded by the scribe accurately reflects the services I personally performed and the decisions made by me.    Charlee Reaves MD    Department of Dermatology  [facility]: Phone: [phone], Fax:[phone]  [facility]: Phone: [phone], Fax: [phone]

## 2019-06-11 NOTE — NURSING NOTE
The following medication was given:     MEDICATION:  Lidocaine with epinephrine 1% 1:082876  ROUTE: SQ  SITE: see procedure note  DOSE: 2cc   LOT #: [id number]  : Bon  EXPIRATION DATE: 1Feb2020  NDC#: 6646-2858-01   Was there drug waste? No  Multi-dose vial: Yes    Fallon Negron LPN  June 11, 2019

## 2019-06-11 NOTE — LETTER
6/11/2019         RE: Ivania Gomez  [address] [facility] [place] [address]        Dear Colleague,    Thank you for referring your patient, Ivania Gomez, to the [facility]. Please see a copy of my visit note below.    [facility] Dermatology Note      Dermatology Problem List:  1. History of dysplastic nevi  -Collision of two compound dysplastic nevi, both with mild atypia, left upper arm, s/p biopsy 5/17/2016  -Compound dysplastic melanocytic nevus, central chest, 6/14/2012    -Junctional dysplastic nevus with moderate atypia, margins narrowly free, 2010, right arm medial  -Compound dysplastic nevus with mild atypia, margins free, 2010, right arm lateral  -Compound dysplastic nevus with moderate atypia, margins free, left forearm, 2010  -Compound dysplastic nevus with mild atypia, left chest, 2010  -Compound dysplastic nevus with mild atypia, margins free, right inner arm, 2010  -Compound dysplastic nevus with moderate atypia, right ear, 2009  -Junctional dysplastic nevus with moderate atypia, right buttock, s/p excision 2009  -Junctional dysplastic nevus with moderate ayptia, negative margin, right posterior shoulder, 2009  -Inflamed junctional dysplastic nevus with moderate atypica, right superior shoulder, neg margins, 2009  -Junctional dysplastic nevus with moderate atypia, right anterior arm, 2009  -Junctional dysplastic nevus with moderate atypia, left superior shoulder, left inferior shoulder, left inner arm, 2009  -Dysplastic nevi, right chest and left breast  2.Irritated compound nevus, left buttock with mild to moderate cystologic atypia, 2007  3. Acne vulgaris  -Current Tx: Tretinoin 0.025% cream  -Previous Tx:  Differin, minocycline with dyspigmentation, doxycycline  4. Halo nevus, right neck, 2010    Encounter Date: Jun 11, 2019    CC:  Chief Complaint   Patient presents with     Skin Check     no areas of concern hx DN         History of Present  Illness:  Ms. Ivania Gomez is a 43 year old female with history of dysplastic nevi who presents for a skin check. She was last seen on 6/19/18 when no evidence of DN was found. Today the patient reports of a new spot on the left knee. Also a lesion on the right thigh. No other concerns.    Past Medical History:   Patient Active Problem List   Diagnosis     CARDIOVASCULAR SCREENING; LDL GOAL LESS THAN 160     Mild major depression (H)     PCOS (polycystic ovarian syndrome)     S/P laparoscopic cholecystectomy     Acne vulgaris     History of dysplastic nevus     Melanocytic nevus     History of gestational diabetes     Chronic rhinitis     Hypertriglyceridemia     Submucous leiomyoma of uterus     Excessive or frequent menstruation     Anxiety     Past Medical History:   Diagnosis Date     Acne vulgaris      Allergies      Anxiety      ASCUS on Pap smear 1994    colp benign     Depressive disorder     post partum and anxiety issues     Diabetes mellitus (H)      History of dysplastic nevus      Hoffa's fat pad disease (H)      Melanocytic nevus 6/14/2012     Obesity, unspecified      PCOS (polycystic ovarian syndrome)      Past Surgical History:   Procedure Laterality Date     BIOPSY      moles removed     CHOLECYSTECTOMY  2011     COLPOSCOPY CERVIX, BIOPSY CERVIX, ENDOCERVICAL CURETTAGE, COMBINED  1994    benign     EYE SURGERY  2009    lasix     EYE SURGERY      Lasik     HYSTERECTOMY, PAP NO LONGER INDICATED  05/25/2018    hysterectomy with bilateral salpingectomy, ovaries remain     Social History:  The patient works as an . The patient uses 1-2  alcoholic beverages per week. The patient denies use of tanning beds.  Kept in chart for convenience.       Family History:  There is no family history of melanoma.  There is no family history of asthma, eczema, psoriasis, or hay fever.   Family hx of dysplastic nevi in mother and sister.   Kept in chart for convenience.       Medications:  Current  Outpatient Medications   Medication Sig Dispense Refill     cetirizine-pseudoePHEDrine ER (ZYRTEC-D ALLERGY & CONGESTION) 5-120 MG 12 hr tablet Take 1 tablet by mouth 2 times daily 180 tablet 3     citalopram (CELEXA) 20 MG tablet Take 1 tablet (20 mg) by mouth daily 90 tablet 1     fluticasone (FLONASE) 50 MCG/ACT spray SPRAY 1 SPRAY INTO BOTH NOSTRILS 2 TIMES DAILY 48 mL 3     LORazepam (ATIVAN) 1 MG tablet Take 1 tablet 30 minutes prior to flight and as needed for severe anxiety. 10 tablet 0     MELATONIN PO Take 5 mg by mouth       metFORMIN (GLUCOPHAGE) 500 MG tablet TAKE 1 TABLET (500 MG) BY MOUTH 2 TIMES DAILY (WITH MEALS) 180 tablet 1     multivitamin, therapeutic with minerals (MULTI-VITAMIN) TABS Take 1 tablet by mouth daily       Allergies   Allergen Reactions     Ibuprofen Hives and Swelling     Naproxen Hives and Swelling     Review of Systems:  -Skin: As above in HPI. No additional skin concerns.   -Const: The patient is generally feeling well today.     Physical exam:  GEN: This is a well developed, well-nourished female in no acute distress, in a pleasant mood.    SKIN: Total skin excluding the undergarment areas was performed. The exam included the head/face, neck, both arms, chest, back, abdomen, both legs, digits and/or nails.   - Nails are painted.   - Multiple regular brown pigmented macules and papules are identified on the trunk and extremities.   - 1 mm pigmented macule on the left knee - regular and homogenous  - 2 mm violaceous papule on the right meidal thigh   - No other lesions of concern on areas examined     Impression/Plan:  1.  History of dysplastic nevi, no clinical evidence of recurrence    Last total skin exam 6/11/2019    Recommended checking nails for streak and brown spots between nail polish changes.     Recommend sunscreens SPF #30 or greater, protective clothing and avoidance of tanning beds.    ABCD's of melanoma were reviewed with patient and handout provided.     2.   Maule, left knee, DDx consitent with a nevus     Recheck at next visit given new     3.  NUB, right medial thigh, DDx thombrust cherry angioma    Shave biopsy:  After discussion of benefits and risks including but not limited to bleeding/bruising, pain/swelling, infection, scar, incomplete removal, nerve damage/numbness, recurrence, and non-diagnostic biopsy, written consent, verbal consent and photographs were obtained. Time-out was performed. The area was cleaned with isopropyl alcohol. 0.5mL of 1% lidocaine with epinephrine was injected to obtain adequate anesthesia of the lesion on the right medial thigh. A shave biopsy was performed. Hemostasis was achieved with aluminium chloride. Vaseline and a sterile dressing were applied. The patient tolerated the procedure and no complications were noted. The patient was provided with verbal and written post care instructions.     4.  Multiple clinically benign nevi    No further intervention required.      Follow-up in 4 months, recheck knee- Dr. Thornton, confirm lesion on knee not evolving, appears benign today    Staff Involved:  Scribe/Staff    Scribe Disclosure  I, Mariama Enrique, am serving as a scribe to document services personally performed by Dr. Charlee Reaves MD, based on data collection and the provider's statements to me.     Provider Disclosure:   The documentation recorded by the scribe accurately reflects the services I personally performed and the decisions made by me.    Charlee Reaves MD    Department of Dermatology  [facility]: Phone: [phone], Fax:[phone]  [facility]: Phone: [phone], Fax: [phone]                Again, thank you for allowing me to participate in the care of your patient.        Sincerely,        Charlee Reaves MD

## 2019-06-14 LAB — COPATH REPORT: NORMAL

## 2019-09-14 DIAGNOSIS — E28.2 PCOS (POLYCYSTIC OVARIAN SYNDROME): ICD-10-CM

## 2019-09-30 ENCOUNTER — ALLIED HEALTH/NURSE VISIT (OUTPATIENT)
Dept: NURSING | Facility: CLINIC | Age: 44
End: 2019-09-30
Payer: COMMERCIAL

## 2019-09-30 DIAGNOSIS — Z23 NEED FOR PROPHYLACTIC VACCINATION AND INOCULATION AGAINST INFLUENZA: Primary | ICD-10-CM

## 2019-09-30 DIAGNOSIS — F33.0 MAJOR DEPRESSIVE DISORDER, RECURRENT EPISODE, MILD (H): ICD-10-CM

## 2019-09-30 PROCEDURE — 90471 IMMUNIZATION ADMIN: CPT

## 2019-09-30 PROCEDURE — 90686 IIV4 VACC NO PRSV 0.5 ML IM: CPT

## 2019-09-30 RX ORDER — CITALOPRAM HYDROBROMIDE 20 MG/1
TABLET ORAL
Qty: 90 TABLET | Refills: 1 | Status: SHIPPED | OUTPATIENT
Start: 2019-09-30 | End: 2020-03-23

## 2019-09-30 NOTE — TELEPHONE ENCOUNTER
Prescription approved per [facility] Refill Protocol.    PHQ-9 SCORE 9/16/2018 3/27/2019 9/30/2019   PHQ-9 Total Score - - -   PHQ-9 Total Score MyChart 0 - 0   PHQ-9 Total Score 0 0 0     CHERELLE-7 SCORE 7/13/2017 9/16/2018 9/30/2019   Total Score - - -   Total Score - 0 (minimal anxiety) 0 (minimal anxiety)   Total Score 0 0 0       Elaine Rey RN

## 2020-03-23 ENCOUNTER — DOCUMENTATION ONLY (OUTPATIENT)
Dept: LAB | Facility: CLINIC | Age: 45
End: 2020-03-23

## 2020-03-23 ENCOUNTER — MYC REFILL (OUTPATIENT)
Dept: FAMILY MEDICINE | Facility: CLINIC | Age: 45
End: 2020-03-23

## 2020-03-23 DIAGNOSIS — E28.2 PCOS (POLYCYSTIC OVARIAN SYNDROME): ICD-10-CM

## 2020-03-23 DIAGNOSIS — F33.0 MAJOR DEPRESSIVE DISORDER, RECURRENT EPISODE, MILD (H): ICD-10-CM

## 2020-03-23 DIAGNOSIS — J30.2 SEASONAL ALLERGIC RHINITIS, UNSPECIFIED TRIGGER: ICD-10-CM

## 2020-03-23 DIAGNOSIS — Z13.6 CARDIOVASCULAR SCREENING; LDL GOAL LESS THAN 160: Primary | ICD-10-CM

## 2020-03-23 NOTE — PROGRESS NOTES
LM and informed of below message and to contact me back directly [phone]. Lakisha Dangelo TC/Pt Rep

## 2020-03-23 NOTE — TELEPHONE ENCOUNTER
Patient is due for an office visit.  Please advise, would you like patient to do a Virtual Visit for follow up?  If so, which one?   Elaine Rey RN

## 2020-03-23 NOTE — PROGRESS NOTES
Physical scheduled for 4/7/20. Is this essential? Or Please review lab orders, sign and close encounter. Lakisha Dangelo TC/Pt Rep

## 2020-03-24 ENCOUNTER — MYC MEDICAL ADVICE (OUTPATIENT)
Dept: FAMILY MEDICINE | Facility: CLINIC | Age: 45
End: 2020-03-24

## 2020-03-24 RX ORDER — CITALOPRAM HYDROBROMIDE 20 MG/1
20 TABLET ORAL DAILY
Qty: 90 TABLET | Refills: 1 | Status: SHIPPED | OUTPATIENT
Start: 2020-03-24 | End: 2020-10-06

## 2020-03-24 RX ORDER — CETIRIZINE HYDROCHLORIDE, PSEUDOEPHEDRINE HYDROCHLORIDE 5; 120 MG/1; MG/1
1 TABLET, FILM COATED, EXTENDED RELEASE ORAL 2 TIMES DAILY
Qty: 180 TABLET | Refills: 3 | Status: SHIPPED | OUTPATIENT
Start: 2020-03-24 | End: 2021-04-06

## 2020-03-24 ASSESSMENT — ANXIETY QUESTIONNAIRES
7. FEELING AFRAID AS IF SOMETHING AWFUL MIGHT HAPPEN: NOT AT ALL
1. FEELING NERVOUS, ANXIOUS, OR ON EDGE: NOT AT ALL
GAD7 TOTAL SCORE: 0
GAD7 TOTAL SCORE: 0
4. TROUBLE RELAXING: NOT AT ALL
5. BEING SO RESTLESS THAT IT IS HARD TO SIT STILL: NOT AT ALL
GAD7 TOTAL SCORE: 0
3. WORRYING TOO MUCH ABOUT DIFFERENT THINGS: NOT AT ALL
2. NOT BEING ABLE TO STOP OR CONTROL WORRYING: NOT AT ALL
7. FEELING AFRAID AS IF SOMETHING AWFUL MIGHT HAPPEN: NOT AT ALL
6. BECOMING EASILY ANNOYED OR IRRITABLE: NOT AT ALL

## 2020-03-24 ASSESSMENT — PATIENT HEALTH QUESTIONNAIRE - PHQ9
10. IF YOU CHECKED OFF ANY PROBLEMS, HOW DIFFICULT HAVE THESE PROBLEMS MADE IT FOR YOU TO DO YOUR WORK, TAKE CARE OF THINGS AT HOME, OR GET ALONG WITH OTHER PEOPLE: NOT DIFFICULT AT ALL
SUM OF ALL RESPONSES TO PHQ QUESTIONS 1-9: 1
SUM OF ALL RESPONSES TO PHQ QUESTIONS 1-9: 1

## 2020-03-24 NOTE — TELEPHONE ENCOUNTER
PHQ 3/27/2019 9/30/2019 3/24/2020   PHQ-9 Total Score 0 0 1   Q9: Thoughts of better off dead/self-harm past 2 weeks Not at all Not at all Not at all     CHERELLE-7 SCORE 9/16/2018 9/30/2019 3/24/2020   Total Score - - -   Total Score 0 (minimal anxiety) 0 (minimal anxiety) 0 (minimal anxiety)   Total Score 0 0 0       Please advise on Metformin refill.  Prescription for Citalopram was sent to the pharmacy.    Elaine Rey RN

## 2020-03-24 NOTE — PROGRESS NOTES
Checked appt desk, patient has canceled appts through [employer]. Lakisha Dangelo TC/Pt Rep

## 2020-03-25 ASSESSMENT — ANXIETY QUESTIONNAIRES: GAD7 TOTAL SCORE: 0

## 2020-03-25 ASSESSMENT — PATIENT HEALTH QUESTIONNAIRE - PHQ9: SUM OF ALL RESPONSES TO PHQ QUESTIONS 1-9: 1

## 2020-03-26 DIAGNOSIS — E28.2 PCOS (POLYCYSTIC OVARIAN SYNDROME): ICD-10-CM

## 2020-03-26 DIAGNOSIS — Z13.6 CARDIOVASCULAR SCREENING; LDL GOAL LESS THAN 160: ICD-10-CM

## 2020-03-26 LAB
ANION GAP SERPL CALCULATED.3IONS-SCNC: 6 MMOL/L (ref 3–14)
BUN SERPL-MCNC: 4 MG/DL (ref 7–30)
CALCIUM SERPL-MCNC: 9.2 MG/DL (ref 8.5–10.1)
CHLORIDE SERPL-SCNC: 105 MMOL/L (ref 94–109)
CHOLEST SERPL-MCNC: 191 MG/DL
CO2 SERPL-SCNC: 26 MMOL/L (ref 20–32)
CREAT SERPL-MCNC: 0.72 MG/DL (ref 0.52–1.04)
GFR SERPL CREATININE-BSD FRML MDRD: >90 ML/MIN/{1.73_M2}
GLUCOSE SERPL-MCNC: 90 MG/DL (ref 70–99)
HDLC SERPL-MCNC: 40 MG/DL
LDLC SERPL CALC-MCNC: 116 MG/DL
NONHDLC SERPL-MCNC: 151 MG/DL
POTASSIUM SERPL-SCNC: 4 MMOL/L (ref 3.4–5.3)
SODIUM SERPL-SCNC: 137 MMOL/L (ref 133–144)
TRIGL SERPL-MCNC: 174 MG/DL

## 2020-03-26 PROCEDURE — 36415 COLL VENOUS BLD VENIPUNCTURE: CPT | Performed by: FAMILY MEDICINE

## 2020-03-26 PROCEDURE — 80048 BASIC METABOLIC PNL TOTAL CA: CPT | Performed by: FAMILY MEDICINE

## 2020-03-26 PROCEDURE — 80061 LIPID PANEL: CPT | Performed by: FAMILY MEDICINE

## 2020-10-05 DIAGNOSIS — F33.0 MAJOR DEPRESSIVE DISORDER, RECURRENT EPISODE, MILD (H): ICD-10-CM

## 2020-10-05 DIAGNOSIS — E28.2 PCOS (POLYCYSTIC OVARIAN SYNDROME): ICD-10-CM

## 2020-10-05 NOTE — LETTER
October 7, 2020    Ivania Gomez  [address] [facility] [place] [address]    Dear Ivania,       We recently received a refill request for Citalopram (CELEXA) and MetFORMIN (GLUCOPHAGE) .  We have refilled this for a one time 6 month supply only because you are due for a:    Physical  office visit Due March 2021  Please call to schedule soon as Dr Zaldivar's physicals are scheduling out at least 3 months or more.    Please call at your earliest convenience so that there will not be a delay with your future refills.          Thank you,   Your [facility] Team/mkr  [phone]

## 2020-10-05 NOTE — TELEPHONE ENCOUNTER
Routing refill request to provider for review/approval because:  Patient needs to be seen because it has been more than 1 year since last office visit.  Last ov 3/27/19  Glenda DALEYN, RN

## 2020-10-06 ENCOUNTER — MYC MEDICAL ADVICE (OUTPATIENT)
Dept: FAMILY MEDICINE | Facility: CLINIC | Age: 45
End: 2020-10-06

## 2020-10-06 RX ORDER — CITALOPRAM HYDROBROMIDE 20 MG/1
TABLET ORAL
Qty: 30 TABLET | Refills: 5 | Status: SHIPPED | OUTPATIENT
Start: 2020-10-06 | End: 2021-04-04

## 2020-10-06 ASSESSMENT — PATIENT HEALTH QUESTIONNAIRE - PHQ9
10. IF YOU CHECKED OFF ANY PROBLEMS, HOW DIFFICULT HAVE THESE PROBLEMS MADE IT FOR YOU TO DO YOUR WORK, TAKE CARE OF THINGS AT HOME, OR GET ALONG WITH OTHER PEOPLE: NOT DIFFICULT AT ALL
SUM OF ALL RESPONSES TO PHQ QUESTIONS 1-9: 0
SUM OF ALL RESPONSES TO PHQ QUESTIONS 1-9: 0

## 2020-10-06 NOTE — TELEPHONE ENCOUNTER
6 month meds given, please collect phq9 now.    Please notify pt to schedule physical 3/2021, should call soon

## 2020-10-07 ASSESSMENT — PATIENT HEALTH QUESTIONNAIRE - PHQ9: SUM OF ALL RESPONSES TO PHQ QUESTIONS 1-9: 0

## 2020-11-12 ENCOUNTER — VIRTUAL VISIT (OUTPATIENT)
Dept: DERMATOLOGY | Facility: CLINIC | Age: 45
End: 2020-11-12
Payer: COMMERCIAL

## 2020-11-12 DIAGNOSIS — Z86.018 HISTORY OF DYSPLASTIC NEVUS: Primary | ICD-10-CM

## 2020-11-12 PROCEDURE — 99213 OFFICE O/P EST LOW 20 MIN: CPT | Mod: TEL | Performed by: DERMATOLOGY

## 2020-11-12 ASSESSMENT — PAIN SCALES - GENERAL: PAINLEVEL: NO PAIN (0)

## 2020-11-12 NOTE — LETTER
11/12/2020         RE: Ivan[place]  [address] [facility] [place] [address]        Dear Colleague,    Thank you for referring your patient, Ivania Gomez, to the [facility]. Please see a copy of my visit note below.    [facility] Dermatology Record:  Store and Forward and Telephone [phone]       Dermatology Problem List:  1. History of dysplastic nevi  -Collision of two compound dysplastic nevi, both with mild atypia, left upper arm, s/p biopsy 5/17/2016  -Compound dysplastic melanocytic nevus, central chest, 6/14/2012    -Junctional dysplastic nevus with moderate atypia, margins narrowly free, 2010, right arm medial  -Compound dysplastic nevus with mild atypia, margins free, 2010, right arm lateral  -Compound dysplastic nevus with moderate atypia, margins free, left forearm, 2010  -Compound dysplastic nevus with mild atypia, left chest, 2010  -Compound dysplastic nevus with mild atypia, margins free, right inner arm, 2010  -Compound dysplastic nevus with moderate atypia, right ear, 2009  -Junctional dysplastic nevus with moderate atypia, right buttock, s/p excision 2009  -Junctional dysplastic nevus with moderate ayptia, negative margin, right posterior shoulder, 2009  -Inflamed junctional dysplastic nevus with moderate atypica, right superior shoulder, neg margins, 2009  -Junctional dysplastic nevus with moderate atypia, right anterior arm, 2009  -Junctional dysplastic nevus with moderate atypia, left superior shoulder, left inferior shoulder, left inner arm, 2009  -Dysplastic nevi, right chest and left breast  2.Irritated compound nevus, left buttock with mild to moderate cystologic atypia, 2007  3. Acne vulgaris  -Current Tx: Tretinoin 0.025% cream  -Previous Tx:  Differin, minocycline with dyspigmentation, doxycycline  4. Halo nevus, right neck, 2010    Encounter Date: Nov 12, 2020    CC:   Chief Complaint   Patient presents with     Derm Problem     Spot check        History of Present Illness:  Ivania Gomez is a 45 year old female who presents for follow up last seen in 2019. angiokeratoma biopsied. Has hx of multiple dysplastic nevi.     Patient reports mole on the right upper cheek, the lowest is darker in color. The middle one and top present for years and no changes    Right upper chest mole is also darker, flat and smooth    Wants left thigh also checked    Has had all for more than 5 years  ROS: Patient is generally feeling well today      Physical Examination:  General: Well-appearing , appropriately-developed individual.  Skin: Focused examination including face and extremity was performed.   -multiple pigmented lesions in photos of face, extremities, papules brown , unfocused and not all interpretable  -right cheek area 3 pigmented lesion, lowest stuck on well demarcated, middle lesion with mild erythema  -right chest with 2 pigmented flat macules well demarcated and equal in color  Labs:  Angiokeratoma last demr path    Past Medical History:   Patient Active Problem List   Diagnosis     CARDIOVASCULAR SCREENING; LDL GOAL LESS THAN 160     Mild major depression (H)     PCOS (polycystic ovarian syndrome)     S/P laparoscopic cholecystectomy     Acne vulgaris     History of dysplastic nevus     Melanocytic nevus     History of gestational diabetes     Chronic rhinitis     Hypertriglyceridemia     Submucous leiomyoma of uterus     Excessive or frequent menstruation     Anxiety     Past Medical History:   Diagnosis Date     Acne vulgaris      Allergies      Anxiety      ASCUS on Pap smear 1994    colp benign     Depressive disorder     post partum and anxiety issues     Diabetes mellitus (H)      History of dysplastic nevus      Hoffa's fat pad disease (H)      Melanocytic nevus 6/14/2012     Obesity, unspecified      PCOS (polycystic ovarian syndrome)      Past Surgical History:   Procedure Laterality Date     BIOPSY      moles removed     CHOLECYSTECTOMY  2011      COLPOSCOPY CERVIX, BIOPSY CERVIX, ENDOCERVICAL CURETTAGE, COMBINED  1994    benign     EYE SURGERY  2009    lasix     EYE SURGERY      Lasik     HYSTERECTOMY, PAP NO LONGER INDICATED  05/25/2018    hysterectomy with bilateral salpingectomy, ovaries remain       Social History:  Patient reports that she has never smoked. She has never used smokeless tobacco. She reports current alcohol use. She reports that she does not use drugs.    Family History:  Family History   Problem Relation Age of Onset     Cancer Mother         vulvar sarcoma     Hypertension Mother      Other Cancer Mother         vulvar sarcoma     Thyroid Disease Mother      Prostate Cancer Paternal Grandfather      Hypertension Maternal Grandmother      Cancer Maternal Grandfather         lung     Hyperlipidemia Brother      Thyroid Disease Sister      Anxiety Disorder Sister      Thyroid Disease Sister        Medications:  Current Outpatient Medications   Medication     cetirizine (ZYRTEC) 10 MG tablet     cetirizine-pseudoePHEDrine ER (ZYRTEC-D ALLERGY & CONGESTION) 5-120 MG 12 hr tablet     citalopram (CELEXA) 20 MG tablet     fluticasone (FLONASE) 50 MCG/ACT spray     LORazepam (ATIVAN) 1 MG tablet     MELATONIN PO     metFORMIN (GLUCOPHAGE) 500 MG tablet     multivitamin, therapeutic with minerals (MULTI-VITAMIN) TABS     No current facility-administered medications for this visit.           Allergies   Allergen Reactions     Ibuprofen Hives and Swelling     Naproxen Hives and Swelling           Impression and Recommendations (Patient Counseled on the Following):  1.  History of dysplastic nev     Last total skin exam 6/11/2019 and due, schedule       2.  Maule, left knee pigmented, DDx consitent with a nevus     Recheck at next visit in person  3. Pigmented lesion in photos in the setting of numerous dysplastic nevi  -need in person assessment          Follow-up:   Follow-up with dermatology in approximately within 6 weeks for nevus assessment.  Earlier for new or changing lesions or rash. Okay for MG or CSC or associated skin care.      Staff only    Charlee Reaves MD    Department of Dermatology  [facility]: Phone: [phone], Fax:[phone]  [facility]: Phone: [phone], Fax: [phone]      _____________________________________________________________________________    Teledermatology information:  - Location of patient: [place]  - Patient presented as: return  - Location of teledermatologist:  ([facility] )  - Reason teledermatology is appropriate:  of National Emergency Regarding Coronavirus disease (COVID 19) Outbreak  - Image quality and interpretability: acceptable  - Physician has received verbal consent for a Video/Photos Visit from the patient? YES  - In-person dermatology visit recommendation: yes - for physician visit  - Date of images:11/9/2020  - Service start time:11:27am  - Service end time:11:33am  - Date of report: 11/12/2020         Again, thank you for allowing me to participate in the care of your patient.        Sincerely,        Charlee Reaves MD

## 2020-11-12 NOTE — NURSING NOTE
"Teledermatology Nurse Call for RETURN patients seen within the last 3 years:      The patient was contacted by phone and we reviewed they have a visit in teledermatology upcoming with an MD or BRIANNA;  Importantly, \"a teledermatology visit may not be as thorough as an in-person visit, and the quality of the photograph and/or video sent may not be of the same quality as that taken by the dermatology clinic.\"     We have found that certain health care needs can be provided without the need for an in-person physical exam.   If a prescription is necessary we can send it directly to your pharmacy.  If lab work is needed we can place an order for that and you can then stop by our lab to have the test done at a later time.Visits are billed at different rates depending on your insurance coverage. Please reach out to your insurance provider with any questions.    The patient chose to:                                                                                                                                                                                                                 Consent to a teledermatology visit with "[employer]" photos. Patient told by nursing these are already uploaded. Instructions sent to patient via "[employer]". They verified they will be in the state of [place] at the time of the encounter.                                                                                                                                                                                                                                     The patient denied skin pain, fever, mucosal symptoms(lesions, blisters, sores in the mouth, nose, eyes, or genitals)  IF PATIENT ENDORSES ANY OF THESE STOP AND PAGE ON CALL ATTENDING      Chief Complaint   Patient presents with     Derm Problem     Spot check     1. Right upper cheek mole - third bottom in photo. Darker in color. Feels slightly raised. Feels a little scaly.   2. Right upper chest " mole - Darker in color. Flat and smoothe.   3. Mole on left thigh - No changes in shape, size or color. Smooth and flat.     She's had all these moles for 5 + years. Denies bleeding, itching or pain.  No current or past treatments on these areas.     LXIONG3, MEDICAL ASSISTANT

## 2020-11-12 NOTE — PATIENT INSTRUCTIONS
[facility] Dermatology Visit    Thank you for allowing us to participate in your care. Your findings, instructions and follow-up plan are as follows:  Come to clinic in person    When should I call my doctor?    If you are worsening or not improving, please, contact us or seek urgent care as noted below.     Who should I call with questions (adults)?    [facility] (adult and pediatric): [phone]     [facility] (adult): [phone]    For urgent needs outside of business hours call the [facility] at [phone] and ask for the dermatology resident on call    If this is a medical emergency and you are unable to reach an ER, Call 911      Who should I call with questions (pediatric)?  [facility]- Pediatric Dermatology  Dr. Maria Antonia Harris, Dr. Steve Blackwell, Dr. Luz Faust, Emelina Casarez, PA  Dr. Beverly Marquez, Dr. Rissa Hamilton & Dr. Mike Goldstein  Non Urgent  Nurse Triage Line; [phone]- Trinh and Hayley LANDIN Care Coordinators   [place] (/Complex ) [phone]    If you need a prescription refill, please contact your pharmacy. Refills are approved or denied by our Physicians during normal business hours, Monday through Fridays  Per office policy, refills will not be granted if you have not been seen within the past year (or sooner depending on your child's condition)    Scheduling Information:  Pediatric Appointment Scheduling and Call Center [phone]  Radiology Scheduling- [phone]  Sedation Unit Scheduling- [phone]  [place] Scheduling- General [phone]; Pediatric Dermatology [phone]  Main  Services: [phone]  Syriac: [phone]  Libyan: [phone]  Hmong/Lithuanian/Kazakh: [phone]  Preadmission Nursing Department Fax Number: [phone] (Fax all pre-operative paperwork to this number)    For urgent matters  arising during evenings, weekends, or holidays that cannot wait for normal business hours please call [phone] and ask for the Dermatology Resident On-Call to be paged.

## 2020-11-12 NOTE — PROGRESS NOTES
[facility] Dermatology Record:  Store and Forward and Telephone [phone]       Dermatology Problem List:  1. History of dysplastic nevi  -Collision of two compound dysplastic nevi, both with mild atypia, left upper arm, s/p biopsy 5/17/2016  -Compound dysplastic melanocytic nevus, central chest, 6/14/2012    -Junctional dysplastic nevus with moderate atypia, margins narrowly free, 2010, right arm medial  -Compound dysplastic nevus with mild atypia, margins free, 2010, right arm lateral  -Compound dysplastic nevus with moderate atypia, margins free, left forearm, 2010  -Compound dysplastic nevus with mild atypia, left chest, 2010  -Compound dysplastic nevus with mild atypia, margins free, right inner arm, 2010  -Compound dysplastic nevus with moderate atypia, right ear, 2009  -Junctional dysplastic nevus with moderate atypia, right buttock, s/p excision 2009  -Junctional dysplastic nevus with moderate ayptia, negative margin, right posterior shoulder, 2009  -Inflamed junctional dysplastic nevus with moderate atypica, right superior shoulder, neg margins, 2009  -Junctional dysplastic nevus with moderate atypia, right anterior arm, 2009  -Junctional dysplastic nevus with moderate atypia, left superior shoulder, left inferior shoulder, left inner arm, 2009  -Dysplastic nevi, right chest and left breast  2.Irritated compound nevus, left buttock with mild to moderate cystologic atypia, 2007  3. Acne vulgaris  -Current Tx: Tretinoin 0.025% cream  -Previous Tx:  Differin, minocycline with dyspigmentation, doxycycline  4. Halo nevus, right neck, 2010    Encounter Date: Nov 12, 2020    CC:   Chief Complaint   Patient presents with     Derm Problem     Spot check       History of Present Illness:  Ivania Gomez is a 45 year old female who presents for follow up last seen in 2019. angiokeratoma biopsied. Has hx of multiple dysplastic nevi.     Patient reports mole on the right upper cheek, the lowest is darker in color. The  middle one and top present for years and no changes    Right upper chest mole is also darker, flat and smooth    Wants left thigh also checked    Has had all for more than 5 years  ROS: Patient is generally feeling well today      Physical Examination:  General: Well-appearing , appropriately-developed individual.  Skin: Focused examination including face and extremity was performed.   -multiple pigmented lesions in photos of face, extremities, papules brown , unfocused and not all interpretable  -right cheek area 3 pigmented lesion, lowest stuck on well demarcated, middle lesion with mild erythema  -right chest with 2 pigmented flat macules well demarcated and equal in color  Labs:  Angiokeratoma last demr path    Past Medical History:   Patient Active Problem List   Diagnosis     CARDIOVASCULAR SCREENING; LDL GOAL LESS THAN 160     Mild major depression (H)     PCOS (polycystic ovarian syndrome)     S/P laparoscopic cholecystectomy     Acne vulgaris     History of dysplastic nevus     Melanocytic nevus     History of gestational diabetes     Chronic rhinitis     Hypertriglyceridemia     Submucous leiomyoma of uterus     Excessive or frequent menstruation     Anxiety     Past Medical History:   Diagnosis Date     Acne vulgaris      Allergies      Anxiety      ASCUS on Pap smear 1994    colp benign     Depressive disorder     post partum and anxiety issues     Diabetes mellitus (H)      History of dysplastic nevus      Hoffa's fat pad disease (H)      Melanocytic nevus 6/14/2012     Obesity, unspecified      PCOS (polycystic ovarian syndrome)      Past Surgical History:   Procedure Laterality Date     BIOPSY      moles removed     CHOLECYSTECTOMY  2011     COLPOSCOPY CERVIX, BIOPSY CERVIX, ENDOCERVICAL CURETTAGE, COMBINED  1994    benign     EYE SURGERY  2009    lasix     EYE SURGERY      Lasik     HYSTERECTOMY, PAP NO LONGER INDICATED  05/25/2018    hysterectomy with bilateral salpingectomy, ovaries remain        Social History:  Patient reports that she has never smoked. She has never used smokeless tobacco. She reports current alcohol use. She reports that she does not use drugs.    Family History:  Family History   Problem Relation Age of Onset     Cancer Mother         vulvar sarcoma     Hypertension Mother      Other Cancer Mother         vulvar sarcoma     Thyroid Disease Mother      Prostate Cancer Paternal Grandfather      Hypertension Maternal Grandmother      Cancer Maternal Grandfather         lung     Hyperlipidemia Brother      Thyroid Disease Sister      Anxiety Disorder Sister      Thyroid Disease Sister        Medications:  Current Outpatient Medications   Medication     cetirizine (ZYRTEC) 10 MG tablet     cetirizine-pseudoePHEDrine ER (ZYRTEC-D ALLERGY & CONGESTION) 5-120 MG 12 hr tablet     citalopram (CELEXA) 20 MG tablet     fluticasone (FLONASE) 50 MCG/ACT spray     LORazepam (ATIVAN) 1 MG tablet     MELATONIN PO     metFORMIN (GLUCOPHAGE) 500 MG tablet     multivitamin, therapeutic with minerals (MULTI-VITAMIN) TABS     No current facility-administered medications for this visit.           Allergies   Allergen Reactions     Ibuprofen Hives and Swelling     Naproxen Hives and Swelling           Impression and Recommendations (Patient Counseled on the Following):  1.  History of dysplastic nev     Last total skin exam 6/11/2019 and due, schedule       2.  Maule, left knee pigmented, DDx consitent with a nevus     Recheck at next visit in person  3. Pigmented lesion in photos in the setting of numerous dysplastic nevi  -need in person assessment          Follow-up:   Follow-up with dermatology in approximately within 6 weeks for nevus assessment. Earlier for new or changing lesions or rash. Okay for MG or CSC or associated skin care.      Staff only    Charlee Reaves MD    Department of Dermatology  [facility]: Phone:  [phone], Fax:[phone]  [facility]: Phone: [phone], Fax: [phone]      _____________________________________________________________________________    Teledermatology information:  - Location of patient: [place]  - Patient presented as: return  - Location of teledermatologist:  [facility] )  - Reason teledermatology is appropriate:  of National Emergency Regarding Coronavirus disease (COVID 19) Outbreak  - Image quality and interpretability: acceptable  - Physician has received verbal consent for a Video/Photos Visit from the patient? YES  - In-person dermatology visit recommendation: yes - for physician visit  - Date of images:11/9/2020  - Service start time:11:27am  - Service end time:11:33am  - Date of report: 11/12/2020

## 2020-12-12 ENCOUNTER — HEALTH MAINTENANCE LETTER (OUTPATIENT)
Age: 45
End: 2020-12-12

## 2020-12-22 ENCOUNTER — OFFICE VISIT (OUTPATIENT)
Dept: DERMATOLOGY | Facility: CLINIC | Age: 45
End: 2020-12-22
Payer: COMMERCIAL

## 2020-12-22 DIAGNOSIS — D48.5 NEOPLASM OF UNCERTAIN BEHAVIOR OF SKIN: ICD-10-CM

## 2020-12-22 DIAGNOSIS — Z86.018 HISTORY OF DYSPLASTIC NEVUS: Primary | ICD-10-CM

## 2020-12-22 DIAGNOSIS — D22.9 MULTIPLE BENIGN NEVI: ICD-10-CM

## 2020-12-22 DIAGNOSIS — L82.1 SK (SEBORRHEIC KERATOSIS): ICD-10-CM

## 2020-12-22 PROCEDURE — 88305 TISSUE EXAM BY PATHOLOGIST: CPT | Performed by: DERMATOLOGY

## 2020-12-22 PROCEDURE — 99213 OFFICE O/P EST LOW 20 MIN: CPT | Mod: 25 | Performed by: DERMATOLOGY

## 2020-12-22 PROCEDURE — 11103 TANGNTL BX SKIN EA SEP/ADDL: CPT | Performed by: DERMATOLOGY

## 2020-12-22 PROCEDURE — 11102 TANGNTL BX SKIN SINGLE LES: CPT | Performed by: DERMATOLOGY

## 2020-12-22 ASSESSMENT — PAIN SCALES - GENERAL: PAINLEVEL: NO PAIN (0)

## 2020-12-22 NOTE — LETTER
12/22/2020         RE: Ivania Gomez  [id number] [address] [facility] [place] [address]        Dear Colleague,    Thank you for referring your patient, Ivania Gomez, to the [facility]. Please see a copy of my visit note below.    [facility] Dermatology Note    Dermatology Problem List:  1. History of dysplastic nevi  -Collision of two compound dysplastic nevi, both with mild atypia, left upper arm, s/p biopsy 5/17/2016  -Compound dysplastic melanocytic nevus, central chest, 6/14/2012    -Junctional dysplastic nevus with moderate atypia, margins narrowly free, 2010, right arm medial  -Compound dysplastic nevus with mild atypia, margins free, 2010, right arm lateral  -Compound dysplastic nevus with moderate atypia, margins free, left forearm, 2010  -Compound dysplastic nevus with mild atypia, left chest, 2010  -Compound dysplastic nevus with mild atypia, margins free, right inner arm, 2010  -Compound dysplastic nevus with moderate atypia, right ear, 2009  -Junctional dysplastic nevus with moderate atypia, right buttock, s/p excision 2009  -Junctional dysplastic nevus with moderate ayptia, negative margin, right posterior shoulder, 2009  -Inflamed junctional dysplastic nevus with moderate atypica, right superior shoulder, neg margins, 2009  -Junctional dysplastic nevus with moderate atypia, right anterior arm, 2009  -Junctional dysplastic nevus with moderate atypia, left superior shoulder, left inferior shoulder, left inner arm, 2009  -Dysplastic nevi, right chest and left breast  2.Irritated compound nevus, left buttock with mild to moderate cystologic atypia, 2007  3. Acne vulgaris  -Current Tx: Tretinoin 0.025% cream  -Previous Tx:  Differin, minocycline with dyspigmentation, doxycycline  4. Halo nevus, right neck, 2010  5. Family hx of melanoma  -Aunt, no first deg relative    Encounter Date: Dec 22, 2020    CC:  Chief Complaint   Patient presents with     Skin Check      recheck left knee, chest and near right eye hx DN     History of Present Illness:  Ms. Ivania Gomez is a 45 year old female who presents for evaluation of pigmented lesions.  Patient was last seen on 11/12/2020 when she had concerns about moles on her right upper cheek, right upper chest and left knee. She was instructed to follow up for in person assessment. Today, she has multiple spots she would like examined today, including her left knee, right upper chest and right upper cheek. She has a spot on her left arm that may be slightly bigger. No fever, chills or night sweats.      Past Medical History:   Patient Active Problem List   Diagnosis     CARDIOVASCULAR SCREENING; LDL GOAL LESS THAN 160     Mild major depression (H)     PCOS (polycystic ovarian syndrome)     S/P laparoscopic cholecystectomy     Acne vulgaris     History of dysplastic nevus     Melanocytic nevus     History of gestational diabetes     Chronic rhinitis     Hypertriglyceridemia     Submucous leiomyoma of uterus     Excessive or frequent menstruation     Anxiety     Past Medical History:   Diagnosis Date     Acne vulgaris      Allergies      Anxiety      ASCUS on Pap smear 1994    colp benign     Depressive disorder     post partum and anxiety issues     Diabetes mellitus (H)      History of dysplastic nevus      Hoffa's fat pad disease (H)      Melanocytic nevus 6/14/2012     Obesity, unspecified      PCOS (polycystic ovarian syndrome)      Past Surgical History:   Procedure Laterality Date     BIOPSY      moles removed     CHOLECYSTECTOMY  2011     COLPOSCOPY CERVIX, BIOPSY CERVIX, ENDOCERVICAL CURETTAGE, COMBINED  1994    benign     EYE SURGERY  2009    lasix     EYE SURGERY      Lasik     HYSTERECTOMY, PAP NO LONGER INDICATED  05/25/2018    hysterectomy with bilateral salpingectomy, ovaries remain       Social History:  Patient reports that she has never smoked. She has never used smokeless tobacco. She reports current alcohol use. She  reports that she does not use drugs.    Family History:  Family History   Problem Relation Age of Onset     Cancer Mother         vulvar sarcoma     Hypertension Mother      Other Cancer Mother         vulvar sarcoma     Thyroid Disease Mother      Prostate Cancer Paternal Grandfather      Hypertension Maternal Grandmother      Cancer Maternal Grandfather         lung     Hyperlipidemia Brother      Thyroid Disease Sister      Anxiety Disorder Sister      Thyroid Disease Sister        Medications:  Current Outpatient Medications   Medication Sig Dispense Refill     cetirizine (ZYRTEC) 10 MG tablet TAKE 1 TABLET (10 MG) BY MOUTH EVERY EVENING 90 tablet 3     cetirizine-pseudoePHEDrine ER (ZYRTEC-D ALLERGY & CONGESTION) 5-120 MG 12 hr tablet Take 1 tablet by mouth 2 times daily 180 tablet 3     citalopram (CELEXA) 20 MG tablet TAKE 1 TABLET BY MOUTH EVERY DAY 30 tablet 5     fluticasone (FLONASE) 50 MCG/ACT spray SPRAY 1 SPRAY INTO BOTH NOSTRILS 2 TIMES DAILY 48 mL 3     LORazepam (ATIVAN) 1 MG tablet Take 1 tablet 30 minutes prior to flight and as needed for severe anxiety. 10 tablet 0     MELATONIN PO Take 5 mg by mouth       metFORMIN (GLUCOPHAGE) 500 MG tablet TAKE 1 TABLET BY MOUTH TWICE A DAY WITH MEALS **30 DAYS PER INS** 60 tablet 5     multivitamin, therapeutic with minerals (MULTI-VITAMIN) TABS Take 1 tablet by mouth daily         Allergies   Allergen Reactions     Ibuprofen Hives and Swelling     Naproxen Hives and Swelling       Review of Systems:  -As per HPI  -Constitutional: Otherwise feeling well today, in usual state of health.  -Skin: As above in HPI. No additional skin concerns.    Physical exam:  Vitals: LMP 04/29/2018 (Exact Date)   GEN: This is a well developed, well-nourished female in no acute distress, in a pleasant mood.    SKIN: Total skin excluding the undergarment areas was performed. The exam included the head/face, neck, both arms, chest, back, abdomen, both legs, digits and/or nails.    - Declined genital and buttocks exam.  -Al skin type: II  -There is numerous well healed surgical scar without erythema, nodularity or telangiectasias at the sites of prior biopsies.  -There is a waxy stuck on tan to brown papule on the left arm.  -Multiple regular brown pigmented macules and papules are identified on the face, trunk and extremities.  -Upper Mid Back: 5 mm pigmented lesion with no clear pigment network centrally  -Left Third toe: 3 mm pigmented macule  -No other lesions of concern on areas examined.     Labs:  Biopsy, pending.    Impression/Plan:  1. History of DN  - ABCDs of melanoma were discussed and self skin checks were advised, Sun precaution was advised including the use of sun screens of SPF 30 or higher, sun protective clothing, and avoidance of tanning beds.  - Recommended following with Dr. Thornton in the future due to patient's history of DN  - Recommended skin exams every 6 months.    2. Multiple clinically benign nevi on the face, trunk and extremities.  - Reassured of the benign nature. We will clinically monitor this area.    3. Neoplasm of uncertain behavior on the upper mid back. The differential diagnosis includes DN vs other.   - Shave biopsy:  After discussion of benefits and risks including but not limited to bleeding/bruising, pain/swelling, infection, scar, incomplete removal, nerve damage/numbness, recurrence, and non-diagnostic biopsy, written consent, verbal consent and photographs were obtained. Time-out was performed. The area was cleaned with isopropyl alcohol. 0.5mL of 1% lidocaine with epinephrine was injected to obtain adequate anesthesia of the lesion. A shave biopsy was performed. Hemostasis was achieved with aluminium chloride. Vaseline and a sterile dressing were applied. The patient tolerated the procedure and no complications were noted. The patient was provided with verbal and written post care instructions.     4. Neoplasm of uncertain behavior  on the left third toe. The differential diagnosis includes DN vs other.   - Shave biopsy:  After discussion of benefits and risks including but not limited to bleeding/bruising, pain/swelling, infection, scar, incomplete removal, nerve damage/numbness, recurrence, and non-diagnostic biopsy, written consent, verbal consent and photographs were obtained. Time-out was performed. The area was cleaned with isopropyl alcohol. 0.5mL of 1% lidocaine with epinephrine was injected to obtain adequate anesthesia of the lesion. A shave biopsy was performed. Hemostasis was achieved with aluminium chloride. Vaseline and a sterile dressing were applied. The patient tolerated the procedure and no complications were noted. The patient was provided with verbal and written post care instructions.       CC No referring provider defined for this encounter. on close of this encounter.  Follow-up in 6 months, earlier for new or changing lesions.       Staff Involved:  Scribe/Staff    Scribe Disclosure:   I, Bal Boyd, am serving as a scribe to document services personally performed by this physician, Dr. Charlee Reaves, based on data collection and the provider's statements to me.     Provider Disclosure:   The documentation recorded by the scribe accurately reflects the services I personally performed and the decisions made by me.    Charlee Reaves MD    Department of Dermatology  [facility]: Phone: [phone], Fax:[phone]  [facility]: Phone: [phone], Fax: [phone]          Again, thank you for allowing me to participate in the care of your patient.        Sincerely,        Charlee Reaves MD

## 2020-12-22 NOTE — NURSING NOTE
The following medication was given:     MEDICATION:  Lidocaine with epinephrine 1% [id number]  ROUTE: SQ  SITE: see procedure note  DOSE: 1cc  LOT #: [id number]  : [employer]  EXPIRATION DATE: 7/2021  NDC#: 1303-1201-37   Was there drug waste? 1cc  Multi-dose vial: Yes    Argenis Gray LPN  December 22, 2020

## 2020-12-22 NOTE — PATIENT INSTRUCTIONS
[facility] Dermatology Visit    Thank you for allowing us to participate in your care. Your findings, instructions and follow-up plan are as follows:         Wound Care After a Biopsy    What is a skin biopsy?  A skin biopsy allows the doctor to examine a very small piece of tissue under the microscope to determine the diagnosis and the best treatment for the skin condition. A local anesthetic (numbing medicine)  is injected with a very small needle into the skin area to be tested. A small piece of skin is taken from the area. Sometimes a suture (stitch) is used.     What are the risks of a skin biopsy?  I will experience scar, bleeding, swelling, pain, crusting and redness. I may experience incomplete removal or recurrence. Risks of this procedure are excessive bleeding, bruising, infection, nerve damage, numbness, thick (hypertrophic or keloidal) scar and non-diagnostic biopsy.    How should I care for my wound for the first 24 hours?    Keep the wound dry and covered for 24 hours    If it bleeds, hold direct pressure on the area for 15 minutes. If bleeding does not stop then go to the emergency room    Avoid strenuous exercise the first 1-2 days or as your doctor instructs you    How should I care for the wound after 24 hours?    After 24 hours, remove the bandage    You may bathe or shower as normal    If you had a scalp biopsy, you can shampoo as usual and can use shower water to clean the biopsy site daily    Clean the wound twice a day with gentle soap and water    Do not scrub, be gentle    Apply white petroleum/Vaseline after cleaning the wound with a cotton swab or a clean finger, and keep the site covered with a Bandaid /bandage. Bandages are not necessary with a scalp biopsy    If you are unable to cover the site with a Bandaid /bandage, re-apply ointment 2-3 times a day to keep the site moist. Moisture will help with healing    Avoid strenuous activity for first 1-2 days    Avoid  lakes, rivers, pools, and oceans until the stitches are removed or the site is healed    How do I clean my wound?    Wash hands thoroughly with soap or use hand  before all wound care    Clean the wound with gentle soap and water    Apply white petroleum/Vaseline  to wound after it is clean    Replace the Bandaid /bandage to keep the wound covered for the first few days or as instructed by your doctor    If you had a scalp biopsy, warm shower water to the area on a daily basis should suffice    What should I use to clean my wound?     Cotton-tipped applicators (Qtips )    White petroleum jelly (Vaseline ). Use a clean new container and use Q-tips to apply.    Bandaids   as needed    Gentle soap     How should I care for my wound long term?    Do not get your wound dirty    Keep up with wound care for one week or until the area is healed.    A small scab will form and fall off by itself when the area is completely healed. The area will be red and will become pink in color as it heals. Sun protection is very important for how your scar will turn out. Sunscreen with an SPF 30 or greater is recommended once the area is healed.    You should have some soreness but it should be mild and slowly go away over several days. Talk to your doctor about using tylenol for pain,    When should I call my doctor?  If you have increased:     Pain or swelling    Pus or drainage (clear or slightly yellow drainage is ok)    Temperature over 100F    Spreading redness or warmth around wound    When will I hear about my results?  The biopsy results can take 2-3 weeks to come back. The clinic will call you with the results, send you a [employer] message, or have you schedule a follow-up clinic or phone time to discuss the results. Contact our clinics if you do not hear from us in 3 weeks.     Who should I call with questions?    [facility]: [phone]     [facility]:  [phone]    For urgent needs outside of business hours call the [facility] at [phone] and ask for the dermatology resident on call          When should I call my doctor?    If you are worsening or not improving, please, contact us or seek urgent care as noted below.     Who should I call with questions (adults)?    [facility] (adult and pediatric): [phone]     [facility] (adult): [phone]    For urgent needs outside of business hours call the [facility] at [phone] and ask for the dermatology resident on call    If this is a medical emergency and you are unable to reach an ER, Call [phone]      Who should I call with questions (pediatric)?  [facility]- Pediatric Dermatology  Dr. Maria Antonia Harris, Dr. Steve Blackwell, Dr. Luz Faust, Emelina Casarez, PA  Dr. Beverly Marquez, Dr. Rissa Hamilton & Dr. Mike Goldstein  Non Urgent  Nurse Triage Line; [phone]- Trnih and Hayley LANDIN Care Coordinators   [place] (/[facility] ) [phone]    If you need a prescription refill, please contact your pharmacy. Refills are approved or denied by our Physicians during normal business hours, Monday through Fridays  Per office policy, refills will not be granted if you have not been seen within the past year (or sooner depending on your child's condition)    Scheduling Information:  Pediatric Appointment Scheduling and Call Center [phone]  Radiology Scheduling- [phone]  Sedation Unit Scheduling- [phone]  [place] Scheduling- General [phone]; Pediatric Dermatology [phone]  Main  Services: [phone]  Bulgarian: [phone]  Estonian: [phone]  Hmong/Turkmen/Occitan: [phone]  Preadmission Nursing Department Fax Number: [phone] (Fax all pre-operative paperwork to this number)    For urgent matters arising during evenings, weekends, or holidays that  cannot wait for normal business hours please call [phone] and ask for the Dermatology Resident On-Call to be paged.        Patient Education     Checking for Skin Cancer  You can find cancer early by checking your skin each month. There are 3 kinds of skin cancer. They are melanoma, basal cell carcinoma, and squamous cell carcinoma. Doing monthly skin checks is the best way to find new marks or skin changes. Follow the instructions below for checking your skin.   The ABCDEs of checking moles for melanoma   Check your moles or growths for signs of melanoma using ABCDE:     Asymmetry: the sides of the mole or growth don t match    Border: the edges are ragged, notched, or blurred    Color: the color within the mole or growth varies    Diameter: the mole or growth is larger than 6 mm (size of a pencil eraser)    Evolving: the size, shape, or color of the mole or growth is changing (evolving is not shown in the images below)    Checking for other types of skin cancer  Basal cell carcinoma or squamous cell carcinoma have symptoms such as:       A spot or mole that looks different from all other marks on your skin    Changes in how an area feels, such as itching, tenderness, or pain    Changes in the skin's surface, such as oozing, bleeding, or scaliness    A sore that does not heal    New swelling or redness beyond the border of a mole    Who s at risk?  Anyone can get skin cancer. But you are at greater risk if you have:     Fair skin, light-colored hair, or light-colored eyes    Many moles or abnormal moles on your skin    A history of sunburns from sunlight or tanning beds    A family history of skin cancer    A history of exposure to radiation or chemicals    A weakened immune system  If you have had skin cancer in the past, you are at risk for recurring skin cancer.   How to check your skin  Do your monthly skin checkups in front of a full-length mirror. Check all parts of your body, including your:     Head  (ears, face, neck, and scalp)    Torso (front, back, and sides)    Arms (tops, undersides, upper, and lower armpits)    Hands (palms, backs, and fingers, including under the nails)    Buttocks and genitals    Legs (front, back, and sides)    Feet (tops, soles, toes, including under the nails, and between toes)  If you have a lot of moles, take digital photos of them each month. Make sure to take photos both up close and from a distance. These can help you see if any moles change over time.   Most skin changes are not cancer. But if you see any changes in your skin, call your doctor right away. Only he or she can diagnose a problem. If you have skin cancer, seeing your doctor can be the first step toward getting the treatment that could save your life.   [employer] last reviewed this educational content on 4/1/2019 2000-2020 [employer]. [address] [place] [address]. All rights reserved. This information is not intended as a substitute for professional medical care. Always follow your healthcare professional's instructions.       When should I call my doctor?    If you are worsening or not improving, please, contact us or seek urgent care as noted below.     Who should I call with questions (adults)?    [facility] (adult and pediatric): [phone]     [facility] (adult): [phone]    For urgent needs outside of business hours call the [facility] at [phone] and ask for the dermatology resident on call    If this is a medical emergency and you are unable to reach an ER, Call [phone]      Who should I call with questions (pediatric)?  [facility]- Pediatric Dermatology  Dr. Maria Antonia Harris, Dr. Steve Blackwell, Dr. Luz Faust, Emelina Casarez, PA  Dr. Beverly Marquez, Dr. Rissa Hamilton & Dr. Mike Goldstein  Non Urgent  Nurse Triage Line; [phone]- Trinh and Hayley LANDIN Care Coordinators    [place] (/Complex ) [phone]    If you need a prescription refill, please contact your pharmacy. Refills are approved or denied by our Physicians during normal business hours, Monday through Fridays  Per office policy, refills will not be granted if you have not been seen within the past year (or sooner depending on your child's condition)    Scheduling Information:  Pediatric Appointment Scheduling and Call Center [phone]  Radiology Scheduling- [phone]  Sedation Unit Scheduling- [phone]  [place] Scheduling- [facility] [phone]; Pediatric Dermatology [phone]  Main  Services: [phone]  Nigerien: [phone]  Bermudian: [phone]  Hmong/Sylvester/Montserratian: [phone]  Preadmission Nursing Department Fax Number: [phone] (Fax all pre-operative paperwork to this number)    For urgent matters arising during evenings, weekends, or holidays that cannot wait for normal business hours please call [phone] and ask for the Dermatology Resident On-Call to be paged.

## 2020-12-22 NOTE — PROGRESS NOTES
[facility] Dermatology Note    Dermatology Problem List:  1. History of dysplastic nevi  -Collision of two compound dysplastic nevi, both with mild atypia, left upper arm, s/p biopsy 5/17/2016  -Compound dysplastic melanocytic nevus, central chest, 6/14/2012    -Junctional dysplastic nevus with moderate atypia, margins narrowly free, 2010, right arm medial  -Compound dysplastic nevus with mild atypia, margins free, 2010, right arm lateral  -Compound dysplastic nevus with moderate atypia, margins free, left forearm, 2010  -Compound dysplastic nevus with mild atypia, left chest, 2010  -Compound dysplastic nevus with mild atypia, margins free, right inner arm, 2010  -Compound dysplastic nevus with moderate atypia, right ear, 2009  -Junctional dysplastic nevus with moderate atypia, right buttock, s/p excision 2009  -Junctional dysplastic nevus with moderate ayptia, negative margin, right posterior shoulder, 2009  -Inflamed junctional dysplastic nevus with moderate atypica, right superior shoulder, neg margins, 2009  -Junctional dysplastic nevus with moderate atypia, right anterior arm, 2009  -Junctional dysplastic nevus with moderate atypia, left superior shoulder, left inferior shoulder, left inner arm, 2009  -Dysplastic nevi, right chest and left breast  2.Irritated compound nevus, left buttock with mild to moderate cystologic atypia, 2007  3. Acne vulgaris  -Current Tx: Tretinoin 0.025% cream  -Previous Tx:  Differin, minocycline with dyspigmentation, doxycycline  4. Halo nevus, right neck, 2010  5. Family hx of melanoma  -Aunt, no first deg relative    Encounter Date: Dec 22, 2020    CC:  Chief Complaint   Patient presents with     Skin Check     recheck left knee, chest and near right eye hx DN     History of Present Illness:  Ms. Ivania Gomez is a 45 year old female who presents for evaluation of pigmented lesions.  Patient was last seen on 11/12/2020 when she had concerns about moles on her  right upper cheek, right upper chest and left knee. She was instructed to follow up for in person assessment. Today, she has multiple spots she would like examined today, including her left knee, right upper chest and right upper cheek. She has a spot on her left arm that may be slightly bigger. No fever, chills or night sweats.      Past Medical History:   Patient Active Problem List   Diagnosis     CARDIOVASCULAR SCREENING; LDL GOAL LESS THAN 160     Mild major depression (H)     PCOS (polycystic ovarian syndrome)     S/P laparoscopic cholecystectomy     Acne vulgaris     History of dysplastic nevus     Melanocytic nevus     History of gestational diabetes     Chronic rhinitis     Hypertriglyceridemia     Submucous leiomyoma of uterus     Excessive or frequent menstruation     Anxiety     Past Medical History:   Diagnosis Date     Acne vulgaris      Allergies      Anxiety      ASCUS on Pap smear 1994    colp benign     Depressive disorder     post partum and anxiety issues     Diabetes mellitus (H)      History of dysplastic nevus      Hoffa's fat pad disease (H)      Melanocytic nevus 6/14/2012     Obesity, unspecified      PCOS (polycystic ovarian syndrome)      Past Surgical History:   Procedure Laterality Date     BIOPSY      moles removed     CHOLECYSTECTOMY  2011     COLPOSCOPY CERVIX, BIOPSY CERVIX, ENDOCERVICAL CURETTAGE, COMBINED  1994    benign     EYE SURGERY  2009    lasix     EYE SURGERY      Lasik     HYSTERECTOMY, PAP NO LONGER INDICATED  05/25/2018    hysterectomy with bilateral salpingectomy, ovaries remain       Social History:  Patient reports that she has never smoked. She has never used smokeless tobacco. She reports current alcohol use. She reports that she does not use drugs.    Family History:  Family History   Problem Relation Age of Onset     Cancer Mother         vulvar sarcoma     Hypertension Mother      Other Cancer Mother         vulvar sarcoma     Thyroid Disease Mother       Prostate Cancer Paternal Grandfather      Hypertension Maternal Grandmother      Cancer Maternal Grandfather         lung     Hyperlipidemia Brother      Thyroid Disease Sister      Anxiety Disorder Sister      Thyroid Disease Sister        Medications:  Current Outpatient Medications   Medication Sig Dispense Refill     cetirizine (ZYRTEC) 10 MG tablet TAKE 1 TABLET (10 MG) BY MOUTH EVERY EVENING 90 tablet 3     cetirizine-pseudoePHEDrine ER (ZYRTEC-D ALLERGY & CONGESTION) 5-120 MG 12 hr tablet Take 1 tablet by mouth 2 times daily 180 tablet 3     citalopram (CELEXA) 20 MG tablet TAKE 1 TABLET BY MOUTH EVERY DAY 30 tablet 5     fluticasone (FLONASE) 50 MCG/ACT spray SPRAY 1 SPRAY INTO BOTH NOSTRILS 2 TIMES DAILY 48 mL 3     LORazepam (ATIVAN) 1 MG tablet Take 1 tablet 30 minutes prior to flight and as needed for severe anxiety. 10 tablet 0     MELATONIN PO Take 5 mg by mouth       metFORMIN (GLUCOPHAGE) 500 MG tablet TAKE 1 TABLET BY MOUTH TWICE A DAY WITH MEALS **30 DAYS PER INS** 60 tablet 5     multivitamin, therapeutic with minerals (MULTI-VITAMIN) TABS Take 1 tablet by mouth daily         Allergies   Allergen Reactions     Ibuprofen Hives and Swelling     Naproxen Hives and Swelling       Review of Systems:  -As per HPI  -Constitutional: Otherwise feeling well today, in usual state of health.  -Skin: As above in HPI. No additional skin concerns.    Physical exam:  Vitals: LMP 04/29/2018 (Exact Date)   GEN: This is a well developed, well-nourished female in no acute distress, in a pleasant mood.    SKIN: Total skin excluding the undergarment areas was performed. The exam included the head/face, neck, both arms, chest, back, abdomen, both legs, digits and/or nails.   - Declined genital and buttocks exam.  -Al skin type: II  -There is numerous well healed surgical scar without erythema, nodularity or telangiectasias at the sites of prior biopsies.  -There is a waxy stuck on tan to brown papule on the  left arm.  -Multiple regular brown pigmented macules and papules are identified on the face, trunk and extremities.  -Upper Mid Back: 5 mm pigmented lesion with no clear pigment network centrally  -Left Third toe: 3 mm pigmented macule  -No other lesions of concern on areas examined.     Labs:  Biopsy, pending.    Impression/Plan:  1. History of DN  - ABCDs of melanoma were discussed and self skin checks were advised, Sun precaution was advised including the use of sun screens of SPF 30 or higher, sun protective clothing, and avoidance of tanning beds.  - Recommended following with Dr. Thornton in the future due to patient's history of DN  - Recommended skin exams every 6 months.    2. Multiple clinically benign nevi on the face, trunk and extremities.  - Reassured of the benign nature. We will clinically monitor this area.    3. Neoplasm of uncertain behavior on the upper mid back. The differential diagnosis includes DN vs other.   - Shave biopsy:  After discussion of benefits and risks including but not limited to bleeding/bruising, pain/swelling, infection, scar, incomplete removal, nerve damage/numbness, recurrence, and non-diagnostic biopsy, written consent, verbal consent and photographs were obtained. Time-out was performed. The area was cleaned with isopropyl alcohol. 0.5mL of 1% lidocaine with epinephrine was injected to obtain adequate anesthesia of the lesion. A shave biopsy was performed. Hemostasis was achieved with aluminium chloride. Vaseline and a sterile dressing were applied. The patient tolerated the procedure and no complications were noted. The patient was provided with verbal and written post care instructions.     4. Neoplasm of uncertain behavior on the left third toe. The differential diagnosis includes DN vs other.   - Shave biopsy:  After discussion of benefits and risks including but not limited to bleeding/bruising, pain/swelling, infection, scar, incomplete removal, nerve  damage/numbness, recurrence, and non-diagnostic biopsy, written consent, verbal consent and photographs were obtained. Time-out was performed. The area was cleaned with isopropyl alcohol. 0.5mL of 1% lidocaine with epinephrine was injected to obtain adequate anesthesia of the lesion. A shave biopsy was performed. Hemostasis was achieved with aluminium chloride. Vaseline and a sterile dressing were applied. The patient tolerated the procedure and no complications were noted. The patient was provided with verbal and written post care instructions.       CC No referring provider defined for this encounter. on close of this encounter.  Follow-up in 6 months, earlier for new or changing lesions.       Staff Involved:  Scribe/Staff    Scribe Disclosure:   I, Bal Boyd, am serving as a scribe to document services personally performed by this physician, Dr. Charlee Reaves, based on data collection and the provider's statements to me.     Provider Disclosure:   The documentation recorded by the scribe accurately reflects the services I personally performed and the decisions made by me.    Charlee Reaves MD    Department of Dermatology  [facility]: Phone: [phone], Fax:[phone]  [facility]: Phone: [phone], Fax: [phone]

## 2020-12-22 NOTE — NURSING NOTE
Ivania Gomez's goals for this visit include:   Chief Complaint   Patient presents with     Skin Check     recheck left knee, chest and near right eye hx DN       She requests these members of her care team be copied on today's visit information:     PCP: Jacquelyn Zaldivar    Referring Provider:  No referring provider defined for this encounter.    LMP 04/29/2018 (Exact Date)     Do you need any medication refills at today's visit? Ariadne Gray LPN

## 2020-12-28 LAB — COPATH REPORT: NORMAL

## 2021-01-19 NOTE — PATIENT INSTRUCTIONS
Preventive Health Recommendations  Female Ages 40 to 49    Yearly exam:     See your health care provider every year in order to  1. Review health changes.   2. Discuss preventive care.    3. Review your medicines if your doctor prescribed any.      Get a Pap test every three years (unless you have an abnormal result and your provider advises testing more often).      If you get Pap tests with HPV test, you only need to test every 5 years, unless you have an abnormal result. You do not need a Pap test if your uterus was removed (hysterectomy) and you have not had cancer.      You should be tested each year for STDs (sexually transmitted diseases), if you're at risk.     Ask your doctor if you should have a mammogram.      Have a colonoscopy (test for colon cancer) if someone in your family has had colon cancer or polyps before age 50.       Have a cholesterol test every 5 years.       Have a diabetes test (fasting glucose) after age 45. If you are at risk for diabetes, you should have this test every 3 years.    Shots: Get a flu shot each year. Get a tetanus shot every 10 years.     Nutrition:     Eat at least 5 servings of fruits and vegetables each day.    Eat whole-grain bread, whole-wheat pasta and brown rice instead of white grains and rice.    Get adequate Calcium and Vitamin D.      Lifestyle    Exercise at least 150 minutes a week (an average of 30 minutes a day, 5 days a week). This will help you control your weight and prevent disease.    Limit alcohol to one drink per day.    No smoking.     Wear sunscreen to prevent skin cancer.    See your dentist every six months for an exam and cleaning.      Please  call [phone] to schedule your mammogram.

## 2021-01-19 NOTE — PROGRESS NOTES
SUBJECTIVE:   CC: Ivania Gomez is an 45 year old woman who presents for preventive health visit.       Patient has been advised of split billing requirements and indicates understanding: Yes  Healthy Habits:     Getting at least 3 servings of Calcium per day:  Yes    Bi-annual eye exam:  NO    Dental care twice a year:  Yes    Sleep apnea or symptoms of sleep apnea:  None    Diet:  Regular (no restrictions)    Frequency of exercise:  2-3 days/week    Duration of exercise:  30-45 minutes    Taking medications regularly:  Yes    Medication side effects:  None    PHQ-2 Total Score: 0    Additional concerns today:  No              Today's PHQ-2 Score:   PHQ-2 ( 1999 Pfizer) 1/29/2021   Q1: Little interest or pleasure in doing things 0   Q2: Feeling down, depressed or hopeless 0   PHQ-2 Score 0   Q1: Little interest or pleasure in doing things Not at all   Q2: Feeling down, depressed or hopeless Not at all   PHQ-2 Score 0       Abuse: Current or Past (Physical, Sexual or Emotional) - No  Do you feel safe in your environment? Yes        Social History     Tobacco Use     Smoking status: Never Smoker     Smokeless tobacco: Never Used     Tobacco comment: nonsmoking household   Substance Use Topics     Alcohol use: Yes     Comment: occasional         Alcohol Use 1/29/2021   Prescreen: >3 drinks/day or >7 drinks/week? No   Prescreen: >3 drinks/day or >7 drinks/week? -       Reviewed orders with patient.  Reviewed health maintenance and updated orders accordingly - Yes    G 2 P 2   Patient's last menstrual period was 04/29/2018 (exact date).     Fasting: Yes    Td: tdap 1/12       Last 3 Pap and HPV Results:   PAP / HPV Latest Ref Rng & Units 7/13/2017 4/11/2014 9/7/2011   PAP - NIL NIL NIL   HPV 16 DNA NEG Negative - -   HPV 18 DNA NEG Negative - -   OTHER HR HPV NEG Negative - -                  Cholesterol:   Lab Results   Component Value Date    CHOL 191 03/26/2020     Lab Results   Component Value Date    HDL 40  2020     Lab Results   Component Value Date     2020     Lab Results   Component Value Date    TRIG 174 2020     Lab Results   Component Value Date    CHOLHDLRATIO 5.2 2015   The 10-year ASCVD risk score (Nettie GARVIN Jr., et al., 2013) is: 1.2%    Values used to calculate the score:      Age: 45 years      Sex: Female      Is Non- : No      Diabetic: No      Tobacco smoker: No      Systolic Blood Pressure: 124 mmHg      Is BP treated: No      HDL Cholesterol: 40 mg/dL      Total Cholesterol: 191 mg/dL        MM  Dexa:  NA     Flex/colo: NA      Seat Belt: Yes    Sunscreen use: Yes   Calcium Intake: Adeq   Health Care Directive: Yes   Sexually Active: Yes     Current contraception: hysterectomy  History of abnormal Pap smear: No  Family history of colon/breast/ovarian cancer: No  Regular self breast exam: Yes  History of abnormal mammogram: No      Labs reviewed in EPIC  BP Readings from Last 3 Encounters:   21 124/78   19 138/84   10/18/18 140/89    Wt Readings from Last 3 Encounters:   21 83.9 kg (185 lb)   19 82.5 kg (181 lb 12.8 oz)   18 82.2 kg (181 lb 3.2 oz)                  Patient Active Problem List   Diagnosis     CARDIOVASCULAR SCREENING; LDL GOAL LESS THAN 160     Mild major depression (H)     PCOS (polycystic ovarian syndrome)     S/P laparoscopic cholecystectomy     Acne vulgaris     History of dysplastic nevus     Melanocytic nevus     History of gestational diabetes     Chronic rhinitis     Hypertriglyceridemia     Submucous leiomyoma of uterus     Excessive or frequent menstruation     Anxiety     Past Surgical History:   Procedure Laterality Date     BIOPSY      moles removed     CHOLECYSTECTOMY       COLPOSCOPY CERVIX, BIOPSY CERVIX, ENDOCERVICAL CURETTAGE, COMBINED      benign     EYE SURGERY      lasix     EYE SURGERY      Lasik     HYSTERECTOMY, PAP NO LONGER INDICATED  2018    hysterectomy  with bilateral salpingectomy, ovaries remain       Social History     Tobacco Use     Smoking status: Never Smoker     Smokeless tobacco: Never Used     Tobacco comment: nonsmoking household   Substance Use Topics     Alcohol use: Yes     Comment: occasional     Family History   Problem Relation Age of Onset     Cancer Mother         vulvar sarcoma     Hypertension Mother      Other Cancer Mother         vulvar sarcoma     Thyroid Disease Mother      Prostate Cancer Paternal Grandfather      Hypertension Maternal Grandmother      Cancer Maternal Grandfather         lung     Hyperlipidemia Brother      Thyroid Disease Sister      Anxiety Disorder Sister      Thyroid Disease Sister          Current Outpatient Medications   Medication Sig Dispense Refill     cetirizine (ZYRTEC) 10 MG tablet TAKE 1 TABLET (10 MG) BY MOUTH EVERY EVENING 90 tablet 3     cetirizine-pseudoePHEDrine ER (ZYRTEC-D ALLERGY & CONGESTION) 5-120 MG 12 hr tablet Take 1 tablet by mouth 2 times daily 180 tablet 3     citalopram (CELEXA) 20 MG tablet TAKE 1 TABLET BY MOUTH EVERY DAY 30 tablet 5     fluticasone (FLONASE) 50 MCG/ACT spray SPRAY 1 SPRAY INTO BOTH NOSTRILS 2 TIMES DAILY 48 mL 3     LORazepam (ATIVAN) 1 MG tablet Take 1 tablet 30 minutes prior to flight and as needed for severe anxiety. 10 tablet 0     MELATONIN PO Take 5 mg by mouth       metFORMIN (GLUCOPHAGE) 500 MG tablet TAKE 1 TABLET BY MOUTH TWICE A DAY WITH MEALS **30 DAYS PER INS** 60 tablet 5     multivitamin, therapeutic with minerals (MULTI-VITAMIN) TABS Take 1 tablet by mouth daily         Mammogram Screening: Recommended annual mammography    Pertinent mammograms are reviewed under the imaging tab.  Reviewed and updated as needed this visit by clinical staff  Tobacco  Allergies  Meds  Problems  Med Hx  Surg Hx  Fam Hx  Soc Hx          Reviewed and updated as needed this visit by Provider  Tobacco  Allergies  Meds  Problems  Med Hx  Surg Hx  Fam Hx        "      Review of Systems   Constitutional: Negative for chills and fever.   HENT: Negative for congestion, ear pain, hearing loss and sore throat.    Eyes: Negative for pain and visual disturbance.   Respiratory: Negative for cough and shortness of breath.    Cardiovascular: Negative for chest pain, palpitations and peripheral edema.   Gastrointestinal: Negative for abdominal pain, constipation, diarrhea, heartburn, hematochezia and nausea.   Breasts:  Negative for tenderness, breast mass and discharge.   Genitourinary: Negative for dysuria, frequency, genital sores, hematuria, pelvic pain, urgency, vaginal bleeding and vaginal discharge.   Musculoskeletal: Negative for arthralgias, joint swelling and myalgias.   Skin: Negative for rash.   Neurological: Negative for dizziness, weakness, headaches and paresthesias.   Psychiatric/Behavioral: Negative for mood changes. The patient is not nervous/anxious.           OBJECTIVE:   /78   Pulse 100   Temp 98  F (36.7  C) (Oral)   Resp 16   Ht 1.638 m (5' 4.5\")   Wt 83.9 kg (185 lb)   LMP 04/29/2018 (Exact Date)   SpO2 98%   BMI 31.26 kg/m    Physical Exam  GENERAL: healthy, alert and no distress  EYES: Eyes grossly normal to inspection, PERRL and conjunctivae and sclerae normal  HENT: ear canals and TM's normal, nose and mouth without ulcers or lesions  NECK: no adenopathy, no asymmetry, masses, or scars   RESP: lungs clear to auscultation - no rales, rhonchi or wheezes  BREAST: small mobile, non-tender mass on left medial breast near sternal border, tenderness or nipple discharge and no palpable axillary masses or adenopathy  CV: regular rate and rhythm, normal S1 S2, no S3 or S4, no murmur, click or rub, no peripheral edema and peripheral pulses strong  ABDOMEN: soft, nontender, no hepatosplenomegaly, no masses and bowel sounds normal  MS: no gross musculoskeletal defects noted, no edema  SKIN: no suspicious lesions or rashes  NEURO: Normal strength and tone, " "mentation intact and speech normal  PSYCH: mentation appears normal, affect normal/bright        ASSESSMENT/PLAN:   (Z00.00) Routine general medical examination at a health care facility  (primary encounter diagnosis)  Comment: Preventative need reviewed   Plan: See orders in Epic    (F32.0) Mild major depression (H)  (F41.9) Anxiety  Comment: Stable, no new concerns  Plan: Continue with citalopram and lorazepam  Ok to refill until Oct 2021 if requested    (E28.2) PCOS (polycystic ovarian syndrome)  Comment: Ongoing, on Metformin  Plan: Basic metabolic panel  (Ca, Cl, CO2, Creat,         Gluc, K, Na, BUN)  Ok to refill until Oct 2021 if requested    (Z83.49) Family history of thyroid disorder  Comment: Patient requesting check today  Plan: TSH with free T4 reflex    (Z12.31) Encounter for screening mammogram for breast cancer  Comment: Due. Small left mass on left breast present.  Plan: MA Screen Bilateral w/Pop    (Z11.9) Screening examination for infectious disease  Comment: Due once  Plan: **Hepatitis C Screen Reflex to RNA FUTURE         anytime       Patient has been advised of split billing requirements and indicates understanding: Yes  COUNSELING:  Reviewed preventive health counseling, as reflected in patient instructions       Regular exercise       Healthy diet/nutrition    Estimated body mass index is 31.26 kg/m  as calculated from the following:    Height as of this encounter: 1.638 m (5' 4.5\").    Weight as of this encounter: 83.9 kg (185 lb).        She reports that she has never smoked. She has never used smokeless tobacco.      Counseling Resources:  ATP IV Guidelines  Pooled Cohorts Equation Calculator  Breast Cancer Risk Calculator  BRCA-Related Cancer Risk Assessment: FHS-7 Tool  FRAX Risk Assessment  ICSI Preventive Guidelines  Dietary Guidelines for Americans, 2010  USDA's MyPlate  ASA Prophylaxis  Lung CA Screening    Jacquelyn Zaldivar MD  [facility]  "

## 2021-01-29 ASSESSMENT — ENCOUNTER SYMPTOMS
NAUSEA: 0
NERVOUS/ANXIOUS: 0
FEVER: 0
PALPITATIONS: 0
SORE THROAT: 0
SHORTNESS OF BREATH: 0
JOINT SWELLING: 0
DIARRHEA: 0
ABDOMINAL PAIN: 0
WEAKNESS: 0
CONSTIPATION: 0
EYE PAIN: 0
CHILLS: 0
COUGH: 0
PARESTHESIAS: 0
ARTHRALGIAS: 0
DYSURIA: 0
MYALGIAS: 0
FREQUENCY: 0
BREAST MASS: 0
HEMATURIA: 0
HEMATOCHEZIA: 0
DIZZINESS: 0
HEARTBURN: 0
HEADACHES: 0

## 2021-02-01 ENCOUNTER — OFFICE VISIT (OUTPATIENT)
Dept: FAMILY MEDICINE | Facility: CLINIC | Age: 46
End: 2021-02-01
Payer: COMMERCIAL

## 2021-02-01 VITALS
SYSTOLIC BLOOD PRESSURE: 124 MMHG | DIASTOLIC BLOOD PRESSURE: 78 MMHG | RESPIRATION RATE: 16 BRPM | BODY MASS INDEX: 30.82 KG/M2 | HEART RATE: 100 BPM | WEIGHT: 185 LBS | HEIGHT: 65 IN | TEMPERATURE: 98 F | OXYGEN SATURATION: 98 %

## 2021-02-01 DIAGNOSIS — Z11.9 SCREENING EXAMINATION FOR INFECTIOUS DISEASE: ICD-10-CM

## 2021-02-01 DIAGNOSIS — Z00.00 ROUTINE GENERAL MEDICAL EXAMINATION AT A HEALTH CARE FACILITY: Primary | ICD-10-CM

## 2021-02-01 DIAGNOSIS — Z12.31 ENCOUNTER FOR SCREENING MAMMOGRAM FOR BREAST CANCER: ICD-10-CM

## 2021-02-01 DIAGNOSIS — E28.2 PCOS (POLYCYSTIC OVARIAN SYNDROME): ICD-10-CM

## 2021-02-01 DIAGNOSIS — F41.9 ANXIETY: ICD-10-CM

## 2021-02-01 DIAGNOSIS — F32.0 MILD MAJOR DEPRESSION (H): ICD-10-CM

## 2021-02-01 DIAGNOSIS — Z83.49 FAMILY HISTORY OF THYROID DISORDER: ICD-10-CM

## 2021-02-01 LAB
ANION GAP SERPL CALCULATED.3IONS-SCNC: 4 MMOL/L (ref 3–14)
BUN SERPL-MCNC: 7 MG/DL (ref 7–30)
CALCIUM SERPL-MCNC: 9 MG/DL (ref 8.5–10.1)
CHLORIDE SERPL-SCNC: 106 MMOL/L (ref 94–109)
CO2 SERPL-SCNC: 29 MMOL/L (ref 20–32)
CREAT SERPL-MCNC: 0.72 MG/DL (ref 0.52–1.04)
GFR SERPL CREATININE-BSD FRML MDRD: >90 ML/MIN/{1.73_M2}
GLUCOSE SERPL-MCNC: 97 MG/DL (ref 70–99)
HCV AB SERPL QL IA: NONREACTIVE
POTASSIUM SERPL-SCNC: 4 MMOL/L (ref 3.4–5.3)
SODIUM SERPL-SCNC: 139 MMOL/L (ref 133–144)
TSH SERPL DL<=0.005 MIU/L-ACNC: 1.32 MU/L (ref 0.4–4)

## 2021-02-01 PROCEDURE — 84443 ASSAY THYROID STIM HORMONE: CPT | Performed by: FAMILY MEDICINE

## 2021-02-01 PROCEDURE — 86803 HEPATITIS C AB TEST: CPT | Performed by: FAMILY MEDICINE

## 2021-02-01 PROCEDURE — 36415 COLL VENOUS BLD VENIPUNCTURE: CPT | Performed by: FAMILY MEDICINE

## 2021-02-01 PROCEDURE — 80048 BASIC METABOLIC PNL TOTAL CA: CPT | Performed by: FAMILY MEDICINE

## 2021-02-01 PROCEDURE — 99214 OFFICE O/P EST MOD 30 MIN: CPT | Mod: 25 | Performed by: FAMILY MEDICINE

## 2021-02-01 PROCEDURE — 99396 PREV VISIT EST AGE 40-64: CPT | Performed by: FAMILY MEDICINE

## 2021-02-01 ASSESSMENT — ENCOUNTER SYMPTOMS
HEADACHES: 0
HEARTBURN: 0
FREQUENCY: 0
DYSURIA: 0
NERVOUS/ANXIOUS: 0
DIARRHEA: 0
COUGH: 0
ARTHRALGIAS: 0
NAUSEA: 0
EYE PAIN: 0
SORE THROAT: 0
ABDOMINAL PAIN: 0
SHORTNESS OF BREATH: 0
DIZZINESS: 0
WEAKNESS: 0
PARESTHESIAS: 0
CONSTIPATION: 0
MYALGIAS: 0
HEMATURIA: 0
JOINT SWELLING: 0
BREAST MASS: 0
HEMATOCHEZIA: 0
CHILLS: 0
FEVER: 0
PALPITATIONS: 0

## 2021-02-01 ASSESSMENT — MIFFLIN-ST. JEOR: SCORE: 1477.09

## 2021-02-01 NOTE — NURSING NOTE
"Chief Complaint   Patient presents with     Physical       Initial BP (!) 144/83   Pulse 115   Temp 98  F (36.7  C) (Oral)   Resp 16   Ht 1.638 m (5' 4.5\")   Wt 83.9 kg (185 lb)   LMP 04/29/2018 (Exact Date)   SpO2 98%   BMI 31.26 kg/m   Estimated body mass index is 31.26 kg/m  as calculated from the following:    Height as of this encounter: 1.638 m (5' 4.5\").    Weight as of this encounter: 83.9 kg (185 lb).  Medication Reconciliation: complete  Hans Ortega CMA    "

## 2021-02-21 ENCOUNTER — HEALTH MAINTENANCE LETTER (OUTPATIENT)
Age: 46
End: 2021-02-21

## 2021-04-04 DIAGNOSIS — F33.0 MAJOR DEPRESSIVE DISORDER, RECURRENT EPISODE, MILD (H): ICD-10-CM

## 2021-04-04 DIAGNOSIS — E28.2 PCOS (POLYCYSTIC OVARIAN SYNDROME): ICD-10-CM

## 2021-04-04 RX ORDER — CITALOPRAM HYDROBROMIDE 20 MG/1
TABLET ORAL
Qty: 30 TABLET | Refills: 5 | Status: SHIPPED | OUTPATIENT
Start: 2021-04-04 | End: 2021-10-03

## 2021-04-05 DIAGNOSIS — J30.2 SEASONAL ALLERGIC RHINITIS, UNSPECIFIED TRIGGER: ICD-10-CM

## 2021-04-05 NOTE — TELEPHONE ENCOUNTER
Routing refill request to provider for review/approval because:  Drug not on the [facility] refill protocol     Qi DALEYN, RN

## 2021-04-06 RX ORDER — CETIRIZINE HCL/PSEUDOEPHEDRINE 5 MG-120MG
TABLET, EXTENDED RELEASE 12 HR ORAL
Qty: 60 TABLET | Refills: 11 | Status: SHIPPED | OUTPATIENT
Start: 2021-04-06 | End: 2022-02-05

## 2021-04-22 DIAGNOSIS — Z12.31 ENCOUNTER FOR SCREENING MAMMOGRAM FOR BREAST CANCER: ICD-10-CM

## 2021-04-22 PROCEDURE — 77067 SCR MAMMO BI INCL CAD: CPT | Mod: TC | Performed by: RADIOLOGY

## 2021-04-22 PROCEDURE — 77063 BREAST TOMOSYNTHESIS BI: CPT | Mod: TC | Performed by: RADIOLOGY

## 2021-09-26 ENCOUNTER — HEALTH MAINTENANCE LETTER (OUTPATIENT)
Age: 46
End: 2021-09-26

## 2021-09-30 ENCOUNTER — MYC MEDICAL ADVICE (OUTPATIENT)
Dept: FAMILY MEDICINE | Facility: CLINIC | Age: 46
End: 2021-09-30

## 2021-09-30 DIAGNOSIS — E28.2 PCOS (POLYCYSTIC OVARIAN SYNDROME): ICD-10-CM

## 2021-09-30 NOTE — TELEPHONE ENCOUNTER
Prescription approved per [facility] Refill Protocol. Patient will need to be seen by PCP in Feb 2022.   Coby Cyr RN, BSN   [facility] [place]

## 2021-10-01 ASSESSMENT — ANXIETY QUESTIONNAIRES
GAD7 TOTAL SCORE: 0
2. NOT BEING ABLE TO STOP OR CONTROL WORRYING: NOT AT ALL
1. FEELING NERVOUS, ANXIOUS, OR ON EDGE: NOT AT ALL
3. WORRYING TOO MUCH ABOUT DIFFERENT THINGS: NOT AT ALL
5. BEING SO RESTLESS THAT IT IS HARD TO SIT STILL: NOT AT ALL
4. TROUBLE RELAXING: NOT AT ALL
8. IF YOU CHECKED OFF ANY PROBLEMS, HOW DIFFICULT HAVE THESE MADE IT FOR YOU TO DO YOUR WORK, TAKE CARE OF THINGS AT HOME, OR GET ALONG WITH OTHER PEOPLE?: NOT DIFFICULT AT ALL
GAD7 TOTAL SCORE: 0
GAD7 TOTAL SCORE: 0
7. FEELING AFRAID AS IF SOMETHING AWFUL MIGHT HAPPEN: NOT AT ALL
7. FEELING AFRAID AS IF SOMETHING AWFUL MIGHT HAPPEN: NOT AT ALL
6. BECOMING EASILY ANNOYED OR IRRITABLE: NOT AT ALL

## 2021-10-01 ASSESSMENT — PATIENT HEALTH QUESTIONNAIRE - PHQ9
SUM OF ALL RESPONSES TO PHQ QUESTIONS 1-9: 0
SUM OF ALL RESPONSES TO PHQ QUESTIONS 1-9: 0
10. IF YOU CHECKED OFF ANY PROBLEMS, HOW DIFFICULT HAVE THESE PROBLEMS MADE IT FOR YOU TO DO YOUR WORK, TAKE CARE OF THINGS AT HOME, OR GET ALONG WITH OTHER PEOPLE: NOT DIFFICULT AT ALL

## 2021-10-02 ASSESSMENT — ANXIETY QUESTIONNAIRES: GAD7 TOTAL SCORE: 0

## 2021-10-03 DIAGNOSIS — F33.0 MAJOR DEPRESSIVE DISORDER, RECURRENT EPISODE, MILD (H): ICD-10-CM

## 2021-10-03 RX ORDER — CITALOPRAM HYDROBROMIDE 20 MG/1
TABLET ORAL
Qty: 30 TABLET | Refills: 4 | Status: SHIPPED | OUTPATIENT
Start: 2021-10-03 | End: 2022-02-05

## 2022-01-26 NOTE — PROGRESS NOTES
"   SUBJECTIVE:   CC: Ivania Gomez is an 46 year old woman who presents for preventive health visit.       Patient has been advised of split billing requirements and indicates understanding: Yes  Healthy Habits:     Getting at least 3 servings of Calcium per day:  Yes    Bi-annual eye exam:  NO    Dental care twice a year:  Yes    Sleep apnea or symptoms of sleep apnea:  None    Diet:  Regular (no restrictions)    Frequency of exercise:  1 day/week    Duration of exercise:  30-45 minutes    Taking medications regularly:  Yes    Medication side effects:  None    PHQ-2 Total Score: 0    Additional concerns today:  No          PROBLEMS TO ADD ON...  Dizziness/BPPV  Duration of complaint: onset in early 20s. Has used Epley maneuver for years, current episodes result in inability to complete maneuver d/t severe nausea.   Eye Problem  Duration of complaint: a few weeks. Aunt recently passed away and patient, \"spent a lot of time crying.\" Denies pain, erythema, or poor eye makeup hygiene habits.       Today's PHQ-2 Score:   PHQ-2 ( 1999 Pfizer) 2/4/2022   Q1: Little interest or pleasure in doing things 0   Q2: Feeling down, depressed or hopeless 0   PHQ-2 Score 0   PHQ-2 Total Score (12-17 Years)- Positive if 3 or more points; Administer PHQ-A if positive -   Q1: Little interest or pleasure in doing things Not at all   Q2: Feeling down, depressed or hopeless Not at all   PHQ-2 Score 0       Abuse: Current or Past (Physical, Sexual or Emotional) - No  Do you feel safe in your environment? Yes    Have you ever done Advance Care Planning? (For example, a Health Directive, POLST, or a discussion with a medical provider or your loved ones about your wishes): No, advance care planning information given to patient to review.  Patient plans to discuss their wishes with loved ones or provider.      Social History     Tobacco Use     Smoking status: Never Smoker     Smokeless tobacco: Never Used     Tobacco comment: nonsmoking " household   Substance Use Topics     Alcohol use: Yes     Comment: occasional         Alcohol Use 2022   Prescreen: >3 drinks/day or >7 drinks/week? No   Prescreen: >3 drinks/day or >7 drinks/week? -       Reviewed orders with patient.  Reviewed health maintenance and updated orders accordingly - Yes    G 2 P 2   Patient's last menstrual period was 2018 (exact date).     Fasting: No  Td: tdap 2022      Flu: 2021, [facility]     Covid: Pf  Boost 11/15/2021     Shingrix: NA     PPV: NA      Last 3 Pap and HPV Results:   PAP / HPV Latest Ref Rng & Units 2017   PAP (Historical) - NIL NIL NIL   HPV16 NEG Negative - -   HPV18 NEG Negative - -   HRHPV NEG Negative - -   s/p benign hysterectomy , pap no longer indicated               Cholesterol:   Lab Results   Component Value Date    CHOL 191 2022    CHOL 191 2020     Lab Results   Component Value Date    HDL 40 2022    HDL 40 2020     Lab Results   Component Value Date     2022     2020     Lab Results   Component Value Date    TRIG 109 2022    TRIG 174 2020     Lab Results   Component Value Date    CHOLHDLRATIO 5.2 2015         MM2021  Dexa:  NA     Flex/colo: order placed, will check with insurance      Seat Belt: Yes    Sunscreen use: Yes   Calcium Intake: Adequate Health Care Directive: Yes   Sexually Active: Yes     Current contraception: hysterectomy  History of abnormal Pap smear: No  Family history of colon/breast/ovarian cancer: No  Regular self breast exam: Yes  History of abnormal mammogram: No  Recent eye exam: Yes  Recent dental visit: Yes   Follows with Derm: Yes       Labs reviewed in EPIC  BP Readings from Last 3 Encounters:   21 124/78   19 138/84   10/18/18 140/89    Wt Readings from Last 3 Encounters:   21 83.9 kg (185 lb)   19 82.5 kg (181 lb 12.8 oz)   18 82.2 kg (181 lb 3.2 oz)                   Patient Active Problem List   Diagnosis     CARDIOVASCULAR SCREENING; LDL GOAL LESS THAN 160     Mild major depression (H)     PCOS (polycystic ovarian syndrome)     S/P laparoscopic cholecystectomy     Acne vulgaris     History of dysplastic nevus     Melanocytic nevus     History of gestational diabetes     Chronic rhinitis     Hypertriglyceridemia     Submucous leiomyoma of uterus     Excessive or frequent menstruation     Anxiety     Past Surgical History:   Procedure Laterality Date     BIOPSY      moles removed     CHOLECYSTECTOMY  2011     COLPOSCOPY CERVIX, BIOPSY CERVIX, ENDOCERVICAL CURETTAGE, COMBINED  1994    benign     EYE SURGERY  2009    lasix     EYE SURGERY      Lasik     HYSTERECTOMY, PAP NO LONGER INDICATED  05/25/2018    hysterectomy with bilateral salpingectomy, ovaries remain       Social History     Tobacco Use     Smoking status: Never Smoker     Smokeless tobacco: Never Used     Tobacco comment: nonsmoking household   Substance Use Topics     Alcohol use: Yes     Comment: occasional     Family History   Problem Relation Age of Onset     Cancer Mother         vulvar sarcoma     Hypertension Mother      Other Cancer Mother         vulvar sarcoma     Thyroid Disease Mother      Prostate Cancer Paternal Grandfather      Hypertension Maternal Grandmother      Cancer Maternal Grandfather         lung     Hyperlipidemia Brother      Thyroid Disease Sister      Anxiety Disorder Sister      Thyroid Disease Sister          Current Outpatient Medications   Medication Sig Dispense Refill     cetirizine (ZYRTEC) 10 MG tablet TAKE 1 TABLET (10 MG) BY MOUTH EVERY EVENING 90 tablet 3     citalopram (CELEXA) 20 MG tablet TAKE 1 TABLET BY MOUTH EVERY DAY 30 tablet 4     fluticasone (FLONASE) 50 MCG/ACT spray SPRAY 1 SPRAY INTO BOTH NOSTRILS 2 TIMES DAILY 48 mL 3     LORazepam (ATIVAN) 1 MG tablet Take 1 tablet 30 minutes prior to flight and as needed for severe anxiety. 10 tablet 0      MELATONIN PO Take 5 mg by mouth       metFORMIN (GLUCOPHAGE) 500 MG tablet TAKE 1 TABLET BY MOUTH TWICE A DAY WITH MEALS **30 DAYS PER INS** 60 tablet 5     multivitamin, therapeutic with minerals (MULTI-VITAMIN) TABS Take 1 tablet by mouth daily       ZYRTEC-D ALLERGY & CONGESTION 5-120 MG 12 hr tablet TAKE 1 TABLET BY MOUTH TWICE A DAY 60 tablet 11       Breast Cancer Screening:  Any new diagnosis of family breast, ovarian, or bowel cancer? No    FHS-7: No flowsheet data found.    Mammogram Screening: Recommended annual mammography  Pertinent mammograms are reviewed under the imaging tab.      Reviewed and updated as needed this visit by clinical staff  Tobacco  Allergies  Meds  Problems  Med Hx  Surg Hx  Fam Hx         Reviewed and updated as needed this visit by Provider  Tobacco  Allergies  Meds  Problems  Med Hx  Surg Hx  Fam Hx        Past Medical History:   Diagnosis Date     Acne vulgaris      Allergies      Anxiety      ASCUS on Pap smear     colp benign     Depressive disorder     post partum and anxiety issues     Diabetes mellitus (H)      History of dysplastic nevus      Hoffa's fat pad disease (H)      Melanocytic nevus 2012     Obesity, unspecified      PCOS (polycystic ovarian syndrome)       Past Surgical History:   Procedure Laterality Date     BIOPSY      moles removed     CHOLECYSTECTOMY       COLPOSCOPY CERVIX, BIOPSY CERVIX, ENDOCERVICAL CURETTAGE, COMBINED      benign     EYE SURGERY      lasix     EYE SURGERY      Lasik     HYSTERECTOMY, PAP NO LONGER INDICATED  2018    hysterectomy with bilateral salpingectomy, ovaries remain     OB History    Para Term  AB Living   2 2 2 0 0 2   SAB IAB Ectopic Multiple Live Births   0 0 0 0 2      # Outcome Date GA Lbr [place]rem[place]ne/2nd Weight Sex Delivery Anes PTL Lv   2 Term 12 40w0d  3.969 kg (8 lb 12 oz) M -SEC   XIMENA      Name: justice   1 Term 07 40w0d  4.026 kg (8 lb 14 oz) M  EPI   "XIMENA      Birth Comments: 3rd degree tear       Review of Systems   Constitutional: Negative for chills and fever.   HENT: Negative for congestion, ear pain, hearing loss and sore throat.    Eyes: Negative for pain and visual disturbance.   Respiratory: Negative for cough and shortness of breath.    Cardiovascular: Negative for chest pain, palpitations and peripheral edema.   Gastrointestinal: Negative for abdominal pain, constipation, diarrhea, heartburn, hematochezia and nausea.   Breasts:  Negative for tenderness, breast mass and discharge.   Genitourinary: Negative for dysuria, frequency, genital sores, hematuria, pelvic pain, urgency, vaginal bleeding and vaginal discharge.   Musculoskeletal: Positive for arthralgias (Bilateral knees; describes as \"manageable\"). Negative for joint swelling and myalgias.   Skin: Negative for rash.   Neurological: Negative for dizziness, weakness, headaches and paresthesias.   Psychiatric/Behavioral: Negative for mood changes. The patient is not nervous/anxious.           OBJECTIVE:   BP (!) 140/102   Pulse 105   Temp 97.8  F (36.6  C) (Oral)   Resp 16   Ht 1.638 m (5' 4.5\")   Wt 83.5 kg (184 lb)   LMP 04/29/2018 (Exact Date)   BMI 31.10 kg/m    Physical Exam  GENERAL: healthy, alert and no distress  EYES: Eyes grossly normal to inspection, PERRL and conjunctivae and sclerae without discoloration, eyelids- single 1mm chalazion on L lower lash line  HENT: ear canals and TM's normal, nose and mouth without ulcers or lesions  NECK: no adenopathy, no asymmetry, masses, or scars and thyroid normal to palpation  RESP: lungs clear to auscultation - no rales, rhonchi or wheezes  BREAST: without masses, tenderness or nipple discharge and no palpable axillary masses or adenopathy  CV: regular rate and rhythm, normal S1 S2, no S3 or S4, no murmur, click or rub, no peripheral edema and peripheral pulses strong  ABDOMEN: soft, nontender, no hepatosplenomegaly, no masses and bowel sounds " "normal  MS: no gross musculoskeletal defects noted, no edema  SKIN: no suspicious lesions or rashes  NEURO: Normal strength and tone, mentation intact and speech normal  PSYCH: mentation appears normal, affect normal/bright    Diagnostic Test Results:  Labs reviewed in Epic    ASSESSMENT/PLAN:   (Z00.00) Routine general medical examination at a health care facility  (primary encounter diagnosis)  (Z12.11) Special screening for malignant neoplasms, colon  Comment: preventative needs reviewed  Plan: Mammogram ordered. Order placed for colonoscopy, check with insurance for coverage. Adult Gastro Ref - Procedure Only.  Follow-up in 6 months ([employer] message & non-fasting lab work)     (E28.2) PCOS (polycystic ovarian syndrome)  Comment: stable  Plan: metFORMIN (GLUCOPHAGE) 500 MG tablet        Continue     (F33.0) Major depressive disorder, recurrent episode, mild (H)  Comment: stable  Plan: citalopram (CELEXA) 20 MG tablet        Continue    (J30.2) Seasonal allergic rhinitis, unspecified trigger  Comment: stable  Plan: cetirizine-pseudoePHEDrine ER (ZYRTEC-D ALLERGY        & CONGESTION) 5-120 MG 12 hr tablet        Continue     (H81.13) Benign paroxysmal positional vertigo due to bilateral vestibular disorder  Comment: More difficult to manage, poor response to independent Epley maneuvers.  Plan: meclizine (ANTIVERT) 25 MG tablet        Discussed side effects. Recommend PT referral, pt politely declined.          COUNSELING:  Reviewed preventive health counseling, as reflected in patient instructions  Special attention given to:        Regular exercise       Healthy diet/nutrition    Estimated body mass index is 31.26 kg/m  as calculated from the following:    Height as of 2/1/21: 1.638 m (5' 4.5\").    Weight as of 2/1/21: 83.9 kg (185 lb).        She reports that she has never smoked. She has never used smokeless tobacco.     Restart home-BP monitoring with validated cuff. Follow-up in six months with [employer] message " to review home BP measurements.       Counseling Resources:  ATP IV Guidelines  Pooled Cohorts Equation Calculator  Breast Cancer Risk Calculator  BRCA-Related Cancer Risk Assessment: FHS-7 Tool  FRAX Risk Assessment  ICSI Preventive Guidelines  Dietary Guidelines for Americans, 2010  USDA's MyPlate  ASA Prophylaxis  Lung CA Screening    Elvia Washington RN  DNP-FNP Student, [facility]  *patient seen by NP student with supervision from MD Jacquelyn Zaldivar MD  [facility]

## 2022-01-26 NOTE — PATIENT INSTRUCTIONS
Preventive Health Recommendations  Female Ages 40 to 49    Yearly exam:     See your health care provider every year in order to  1. Review health changes.   2. Discuss preventive care.    3. Review your medicines if your doctor prescribed any.      Get a Pap test every three years (unless you have an abnormal result and your provider advises testing more often).      If you get Pap tests with HPV test, you only need to test every 5 years, unless you have an abnormal result. You do not need a Pap test if your uterus was removed (hysterectomy) and you have not had cancer.      You should be tested each year for STDs (sexually transmitted diseases), if you're at risk.     Ask your doctor if you should have a mammogram.      Have a colonoscopy (test for colon cancer) if someone in your family has had colon cancer or polyps before age 50.       Have a cholesterol test every 5 years.       Have a diabetes test (fasting glucose) after age 45. If you are at risk for diabetes, you should have this test every 3 years.    Shots: Get a flu shot each year. Get a tetanus shot every 10 years.     Nutrition:     Eat at least 5 servings of fruits and vegetables each day.    Eat whole-grain bread, whole-wheat pasta and brown rice instead of white grains and rice.    Get adequate Calcium and Vitamin D.      Lifestyle    Exercise at least 150 minutes a week (an average of 30 minutes a day, 5 days a week). This will help you control your weight and prevent disease.    Limit alcohol to one drink per day.    No smoking.     Wear sunscreen to prevent skin cancer.    See your dentist every six months for an exam and cleaning.      Work on vertigo.

## 2022-01-31 ENCOUNTER — DOCUMENTATION ONLY (OUTPATIENT)
Dept: LAB | Facility: CLINIC | Age: 47
End: 2022-01-31
Payer: COMMERCIAL

## 2022-01-31 DIAGNOSIS — Z86.32 HISTORY OF GESTATIONAL DIABETES: ICD-10-CM

## 2022-01-31 DIAGNOSIS — E78.1 HYPERTRIGLYCERIDEMIA: ICD-10-CM

## 2022-01-31 DIAGNOSIS — E28.2 PCOS (POLYCYSTIC OVARIAN SYNDROME): Primary | ICD-10-CM

## 2022-01-31 NOTE — PROGRESS NOTES
Ivania Gomez has an upcoming lab appointment:    Future Appointments   Date Time Provider Department Center   2/2/2022  7:45 AM [facility]   2/7/2022  7:30 AM Jacquelyn Zaldivar MD [facility]   3/10/2022 11:15 AM Pooja Thornton MD [facility]     Patient is scheduled for the following lab(s): PV fasting labs     There is no order available. Please review and place either future orders or HMPO (Review of Health Maintenance Protocol Orders), as appropriate.    Health Maintenance Due   Topic     ANNUAL REVIEW OF HM ORDERS      BMP      CREATININE      Nini Guaman

## 2022-02-02 ENCOUNTER — LAB (OUTPATIENT)
Dept: LAB | Facility: CLINIC | Age: 47
End: 2022-02-02
Payer: COMMERCIAL

## 2022-02-02 DIAGNOSIS — E78.1 HYPERTRIGLYCERIDEMIA: ICD-10-CM

## 2022-02-02 DIAGNOSIS — E28.2 PCOS (POLYCYSTIC OVARIAN SYNDROME): ICD-10-CM

## 2022-02-02 DIAGNOSIS — Z86.32 HISTORY OF GESTATIONAL DIABETES: ICD-10-CM

## 2022-02-02 LAB
ANION GAP SERPL CALCULATED.3IONS-SCNC: 3 MMOL/L (ref 3–14)
BUN SERPL-MCNC: 6 MG/DL (ref 7–30)
CALCIUM SERPL-MCNC: 9.1 MG/DL (ref 8.5–10.1)
CHLORIDE BLD-SCNC: 109 MMOL/L (ref 94–109)
CHOLEST SERPL-MCNC: 191 MG/DL
CO2 SERPL-SCNC: 29 MMOL/L (ref 20–32)
CREAT SERPL-MCNC: 0.7 MG/DL (ref 0.52–1.04)
FASTING STATUS PATIENT QL REPORTED: YES
GFR SERPL CREATININE-BSD FRML MDRD: >90 ML/MIN/1.73M2
GLUCOSE BLD-MCNC: 101 MG/DL (ref 70–99)
HBA1C MFR BLD: 5.5 % (ref 0–5.6)
HDLC SERPL-MCNC: 40 MG/DL
LDLC SERPL CALC-MCNC: 129 MG/DL
NONHDLC SERPL-MCNC: 151 MG/DL
POTASSIUM BLD-SCNC: 3.9 MMOL/L (ref 3.4–5.3)
SODIUM SERPL-SCNC: 141 MMOL/L (ref 133–144)
TRIGL SERPL-MCNC: 109 MG/DL

## 2022-02-02 PROCEDURE — 80061 LIPID PANEL: CPT

## 2022-02-02 PROCEDURE — 80048 BASIC METABOLIC PNL TOTAL CA: CPT

## 2022-02-02 PROCEDURE — 83036 HEMOGLOBIN GLYCOSYLATED A1C: CPT

## 2022-02-02 PROCEDURE — 36415 COLL VENOUS BLD VENIPUNCTURE: CPT

## 2022-02-04 ASSESSMENT — ENCOUNTER SYMPTOMS
MYALGIAS: 0
DIZZINESS: 0
PALPITATIONS: 0
ABDOMINAL PAIN: 0
COUGH: 0
SORE THROAT: 0
CHILLS: 0
HEMATOCHEZIA: 0
NERVOUS/ANXIOUS: 0
HEARTBURN: 0
NAUSEA: 0
WEAKNESS: 0
JOINT SWELLING: 0
HEADACHES: 0
FEVER: 0
SHORTNESS OF BREATH: 0
HEMATURIA: 0
EYE PAIN: 0
CONSTIPATION: 0
PARESTHESIAS: 0
ARTHRALGIAS: 1
FREQUENCY: 0
DYSURIA: 0
DIARRHEA: 0
BREAST MASS: 0

## 2022-02-05 ASSESSMENT — ENCOUNTER SYMPTOMS
NAUSEA: 0
WEAKNESS: 0
EYE PAIN: 0
PALPITATIONS: 0
CHILLS: 0
JOINT SWELLING: 0
ABDOMINAL PAIN: 0
DYSURIA: 0
BREAST MASS: 0
FEVER: 0
CONSTIPATION: 0
NERVOUS/ANXIOUS: 0
MYALGIAS: 0
HEMATOCHEZIA: 0
HEADACHES: 0
COUGH: 0
SHORTNESS OF BREATH: 0
HEARTBURN: 0
HEMATURIA: 0
DIZZINESS: 0
FREQUENCY: 0
SORE THROAT: 0
PARESTHESIAS: 0
DIARRHEA: 0

## 2022-02-07 ENCOUNTER — OFFICE VISIT (OUTPATIENT)
Dept: FAMILY MEDICINE | Facility: CLINIC | Age: 47
End: 2022-02-07
Payer: COMMERCIAL

## 2022-02-07 ENCOUNTER — MYC MEDICAL ADVICE (OUTPATIENT)
Dept: FAMILY MEDICINE | Facility: CLINIC | Age: 47
End: 2022-02-07

## 2022-02-07 VITALS
TEMPERATURE: 97.8 F | SYSTOLIC BLOOD PRESSURE: 140 MMHG | DIASTOLIC BLOOD PRESSURE: 102 MMHG | BODY MASS INDEX: 30.66 KG/M2 | HEART RATE: 105 BPM | WEIGHT: 184 LBS | RESPIRATION RATE: 16 BRPM | HEIGHT: 65 IN

## 2022-02-07 DIAGNOSIS — F33.0 MAJOR DEPRESSIVE DISORDER, RECURRENT EPISODE, MILD (H): ICD-10-CM

## 2022-02-07 DIAGNOSIS — J30.2 SEASONAL ALLERGIC RHINITIS, UNSPECIFIED TRIGGER: ICD-10-CM

## 2022-02-07 DIAGNOSIS — E28.2 PCOS (POLYCYSTIC OVARIAN SYNDROME): ICD-10-CM

## 2022-02-07 DIAGNOSIS — Z00.00 ROUTINE GENERAL MEDICAL EXAMINATION AT A HEALTH CARE FACILITY: Primary | ICD-10-CM

## 2022-02-07 DIAGNOSIS — H81.13 BENIGN PAROXYSMAL POSITIONAL VERTIGO DUE TO BILATERAL VESTIBULAR DISORDER: ICD-10-CM

## 2022-02-07 DIAGNOSIS — Z12.11 SPECIAL SCREENING FOR MALIGNANT NEOPLASMS, COLON: ICD-10-CM

## 2022-02-07 PROCEDURE — 90471 IMMUNIZATION ADMIN: CPT | Performed by: FAMILY MEDICINE

## 2022-02-07 PROCEDURE — 90715 TDAP VACCINE 7 YRS/> IM: CPT | Performed by: FAMILY MEDICINE

## 2022-02-07 PROCEDURE — 99396 PREV VISIT EST AGE 40-64: CPT | Mod: 25 | Performed by: FAMILY MEDICINE

## 2022-02-07 RX ORDER — CETIRIZINE HYDROCHLORIDE, PSEUDOEPHEDRINE HYDROCHLORIDE 5; 120 MG/1; MG/1
TABLET, FILM COATED, EXTENDED RELEASE ORAL
Qty: 180 TABLET | Refills: 3 | Status: SHIPPED | OUTPATIENT
Start: 2022-02-07 | End: 2023-02-08

## 2022-02-07 RX ORDER — MECLIZINE HYDROCHLORIDE 25 MG/1
25 TABLET ORAL 3 TIMES DAILY PRN
Qty: 90 TABLET | Refills: 1 | Status: SHIPPED | OUTPATIENT
Start: 2022-02-07

## 2022-02-07 RX ORDER — CITALOPRAM HYDROBROMIDE 20 MG/1
20 TABLET ORAL DAILY
Qty: 90 TABLET | Refills: 1 | Status: SHIPPED | OUTPATIENT
Start: 2022-02-07 | End: 2022-08-16

## 2022-02-07 ASSESSMENT — PAIN SCALES - GENERAL: PAINLEVEL: NO PAIN (0)

## 2022-02-07 ASSESSMENT — MIFFLIN-ST. JEOR: SCORE: 1467.56

## 2022-02-07 ASSESSMENT — ENCOUNTER SYMPTOMS: ARTHRALGIAS: 1

## 2022-02-07 NOTE — NURSING NOTE
Prior to immunization administration, verified patients identity using patient s name and date of birth. Please see Immunization Activity for additional information.     Screening Questionnaire for Adult Immunization    Are you sick today?   No   Do you have allergies to medications, food, a vaccine component or latex?   Yes   Have you ever had a serious reaction after receiving a vaccination?   No   Do you have a long-term health problem with heart, lung, kidney, or metabolic disease (e.g., diabetes), asthma, a blood disorder, no spleen, complement component deficiency, a cochlear implant, or a spinal fluid leak?  Are you on long-term aspirin therapy?   No   Do you have cancer, leukemia, HIV/AIDS, or any other immune system problem?   No   Do you have a parent, brother, or sister with an immune system problem?   No   In the past 3 months, have you taken medications that affect  your immune system, such as prednisone, other steroids, or anticancer drugs; drugs for the treatment of rheumatoid arthritis, Crohn s disease, or psoriasis; or have you had radiation treatments?   No   Have you had a seizure, or a brain or other nervous system problem?   No   During the past year, have you received a transfusion of blood or blood    products, or been given immune (gamma) globulin or antiviral drug?   No   For women: Are you pregnant or is there a chance you could become       pregnant during the next month?   No   Have you received any vaccinations in the past 4 weeks?   No     Immunization questionnaire was positive for at least one answer.  Notified Dr. Zaldivar .        Per orders of Dr. Zaldivar , injection of tdap  given by Hans Sexton CMA. Patient instructed to remain in clinic for 15 minutes afterwards, and to report any adverse reaction to me immediately.       Screening performed by Hans Sexton CMA on 2/7/2022 at 8:30 AM.

## 2022-03-09 NOTE — PROGRESS NOTES
[facility] Dermatology Note  Encounter Date: Mar 10, 2022  Office Visit     Dermatology Problem List:  Last skin check 3/2022, recommended yearly  1. History of dysplastic nevi  -Compound dysplastic nevus with mild atypia, upper mid back, bx 12/22/20  -Collision of two compound dysplastic nevi, both with mild atypia, left upper arm, s/p biopsy 5/17/2016  -Compound dysplastic melanocytic nevus, central chest, 6/14/2012    -Junctional dysplastic nevus with moderate atypia, margins narrowly free, 2010, right arm medial  -Compound dysplastic nevus with mild atypia, margins free, 2010, right arm lateral  -Compound dysplastic nevus with moderate atypia, margins free, left forearm, 2010  -Compound dysplastic nevus with mild atypia, left chest, 2010  -Compound dysplastic nevus with mild atypia, margins free, right inner arm, 2010  -Compound dysplastic nevus with moderate atypia, right ear, 2009  -Junctional dysplastic nevus with moderate atypia, right buttock, s/p excision 2009  -Junctional dysplastic nevus with moderate ayptia, negative margin, right posterior shoulder, 2009  -Inflamed junctional dysplastic nevus with moderate atypica, right superior shoulder, neg margins, 2009  -Junctional dysplastic nevus with moderate atypia, right anterior arm, 2009  -Junctional dysplastic nevus with moderate atypia, left superior shoulder, left inferior shoulder, left inner arm, 2009  -Dysplastic nevi, right chest and left breast  -Irritated compound nevus, left buttock with mild to moderate cystologic atypia, 2007  2. Acne vulgaris  -Current Tx: Tretinoin 0.025% cream  -Previous Tx:  Differin, minocycline with dyspigmentation, doxycycline  3. Halo nevus, right neck, 2010  4. Benign biopsies  - Compound melanocytic nevus, left third toe, bx 12/22/20  5. Dermatitis, left lateral inferior flank  - Empiric TAC 0.1% cream, consider biopsy if fails to resolve after 1 month    Family History: Aunt with melanoma. Mom had  vulvar sarcoma.  Social History:  for [employer]. Has 15 and 10 year old boys  ____________________________________________    ASSESSMENT/PLAN:    # History of DN: No evidence of recurrence. Many but all mild or moderate. While these are benign, they are a marker for increased melanoma risk.  - Recommended yearly skin checks.    # Sun damaged skin with solar lentigines. Chronic. Stable.   - Recommended sunscreens SPF #30 or greater, protective clothing and avoidance of tanning beds.     # Benign findings include: sebaceous hyperplasia, seborrheic keratoses, cherry angioma, dermatofibroma. Self limited, minor.   - No further intervention required. Patient to report changes.  - Patient reassured of the benign nature of these lesions.    # Multiple clinically benign nevi. Chronic. Stable.   - No further intervention required. Patient to report changes.  - Patient reassured of the benign nature of these lesions.    # Soft ill defined subcutaneous nodule, 4x4 cm on the left upper back. Ddx lipoma vs tight muscle.    - Will monitor for changes.    # Rash, left lateral flank. Not perfect for eczema, but will try empiric treating. Recommended triam 0.1% cream BID x4 weeks. If not resolved then, advised to call office for follow up appt.      Procedures Performed:   None.    Follow-up: 1 year(s) in-person, or earlier for new or changing lesions    Staff and Scribe:     Scribe Disclosure:   I, Bal Boyd, am serving as a scribe to document services personally performed by this physician, Dr. Pooja Thornton, based on data collection and the provider's statements to me.     Provider Disclosure:   The documentation recorded by the scribe accurately reflects the services I personally performed and the decisions made by me.    Pooja Thornton MD    Department of Dermatology  [facility]: Phone: [phone],  Fax:[phone]  [facility]: Phone: [phone], Fax: [phone]    ____________________________________________    CC: Skin Check (family hx of melanoma- personal hx of dn/ areas of concern- none)    HPI:  Ms. Ivania Gomez is a(n) 46 year old female who presents today as a return patient for a skin check.    Last seen 12/22/20 by Dr. Reaves for a skin check. At that time, a biopsy was taken from the upper mid back (DN with mild atypia) and left third toe (compound melanocytic nevus).     Today, she has no specific areas of concern. She noticed a rash on the left flank this morning. The area has not been itchy.    Patient is otherwise feeling well, without additional concerns.    Labs:  NA    Physical exam:  Vitals: LMP 04/29/2018 (Exact Date)   SKIN: Total skin excluding the undergarment areas was performed. The exam included the head/face, neck, both arms, chest, back, abdomen, both legs, buttocks, digits and/or nails.   - Al Type II  - Soft ill defined subcutaneous nodule, 4x4 cm on the left upper back  - Well healed scars in areas of past DN. No pigment recurrence.  - Scattered brown macules on sun exposed areas.  - There are yellow oily papules with central umbilication located on the face.  - There are dome shaped bright red papules on the trunk and extremities.   - Multiple regular brown pigmented macules and papules are identified on the trunk and extremities. Several on the trunk are larger than 5 mm in size with congenital appearance on dermoscopy. Greater than 100 nevi in all. Most are on the upper body.  - There is a waxy stuck on tan to brown papule on the left upper arm.  - There is a firm tan/flesh colored papule that dimples with lateral pressure on the right lateral knee and left medial thigh.  - Pink xerotic papules on the left inferior lateral flank  - No other lesions of concern on areas examined.     Medications:  Current Outpatient  Medications   Medication     cetirizine-pseudoePHEDrine ER (ZYRTEC-D ALLERGY & CONGESTION) 5-120 MG 12 hr tablet     citalopram (CELEXA) 20 MG tablet     fluticasone (FLONASE) 50 MCG/ACT spray     LORazepam (ATIVAN) 1 MG tablet     meclizine (ANTIVERT) 25 MG tablet     MELATONIN PO     metFORMIN (GLUCOPHAGE) 500 MG tablet     multivitamin, therapeutic with minerals (MULTI-VITAMIN) TABS     No current facility-administered medications for this visit.      Past Medical History:   Patient Active Problem List   Diagnosis     CARDIOVASCULAR SCREENING; LDL GOAL LESS THAN 160     Mild major depression (H)     PCOS (polycystic ovarian syndrome)     S/P laparoscopic cholecystectomy     Acne vulgaris     History of dysplastic nevus     Melanocytic nevus     History of gestational diabetes     Chronic rhinitis     Hypertriglyceridemia     Submucous leiomyoma of uterus     Excessive or frequent menstruation     Anxiety     Past Medical History:   Diagnosis Date     Acne vulgaris      Allergies      Anxiety      ASCUS on Pap smear 1994    colp benign     Depressive disorder     post partum and anxiety issues     Diabetes mellitus (H)      History of dysplastic nevus      Hoffa's fat pad disease (H)      Melanocytic nevus 6/14/2012     Obesity, unspecified      PCOS (polycystic ovarian syndrome)         CC No referring provider defined for this encounter. on close of this encounter.

## 2022-03-10 ENCOUNTER — OFFICE VISIT (OUTPATIENT)
Dept: DERMATOLOGY | Facility: CLINIC | Age: 47
End: 2022-03-10
Payer: COMMERCIAL

## 2022-03-10 DIAGNOSIS — L20.84 INTRINSIC ECZEMA: ICD-10-CM

## 2022-03-10 DIAGNOSIS — D22.9 MULTIPLE BENIGN NEVI: ICD-10-CM

## 2022-03-10 DIAGNOSIS — L57.8 SUN-DAMAGED SKIN: ICD-10-CM

## 2022-03-10 DIAGNOSIS — D18.01 CHERRY ANGIOMA: ICD-10-CM

## 2022-03-10 DIAGNOSIS — L81.4 SOLAR LENTIGO: ICD-10-CM

## 2022-03-10 DIAGNOSIS — D17.1 LIPOMA OF TORSO: ICD-10-CM

## 2022-03-10 DIAGNOSIS — L73.8 SEBACEOUS HYPERPLASIA: ICD-10-CM

## 2022-03-10 DIAGNOSIS — Z86.018 HISTORY OF DYSPLASTIC NEVUS: Primary | ICD-10-CM

## 2022-03-10 DIAGNOSIS — D23.9 DERMATOFIBROMA: ICD-10-CM

## 2022-03-10 DIAGNOSIS — L82.1 SEBORRHEIC KERATOSES: ICD-10-CM

## 2022-03-10 PROCEDURE — 99214 OFFICE O/P EST MOD 30 MIN: CPT | Performed by: DERMATOLOGY

## 2022-03-10 RX ORDER — TRIAMCINOLONE ACETONIDE 1 MG/G
OINTMENT TOPICAL
Qty: 30 G | Refills: 2 | Status: SHIPPED | OUTPATIENT
Start: 2022-03-10

## 2022-03-10 NOTE — PATIENT INSTRUCTIONS
For a body soap in the shower, I recommend Dove bar soap, unscented for sensitive skin. For a liquid soap, I recommend Cetaphil gentle facial cleanser or Vanicream gentle facial cleanser.     If your skin is itchy, apply the topical steroid triamcinolone twice a day on the skin that is itchy and then apply the moisturizer.     Every morning and night (even if you are not itchy), apply a moisturizer. You should apply this after getting out of the shower, preferably on your entire body. I recommend a cream based moisturizer, like CeraVe, Vanicream or Cetaphil.

## 2022-03-10 NOTE — NURSING NOTE
Ivania Gomez's goals for this visit include:   Chief Complaint   Patient presents with     Skin Check     family hx of melanoma- personal hx of dn/ areas of concern- none     She requests these members of her care team be copied on today's visit information:     PCP: Jacquelyn Zaldivar    Referring Provider:  No referring provider defined for this encounter.    LMP 04/29/2018 (Exact Date)     Do you need any medication refills at today's visit? No  Vivian Oropeza, CMA on 3/10/2022 at 11:05 AM

## 2022-03-10 NOTE — LETTER
3/10/2022         RE: Ivania Gomez  [id number] [place] [address]        Dear Colleague,    Thank you for referring your patient, Ivania Gomez, to the [facility]. Please see a copy of my visit note below.    [facility] Dermatology Note  Encounter Date: Mar 10, 2022  Office Visit     Dermatology Problem List:  Last skin check 3/2022, recommended yearly  1. History of dysplastic nevi  -Compound dysplastic nevus with mild atypia, upper mid back, bx 12/22/20  -Collision of two compound dysplastic nevi, both with mild atypia, left upper arm, s/p biopsy 5/17/2016  -Compound dysplastic melanocytic nevus, central chest, 6/14/2012    -Junctional dysplastic nevus with moderate atypia, margins narrowly free, 2010, right arm medial  -Compound dysplastic nevus with mild atypia, margins free, 2010, right arm lateral  -Compound dysplastic nevus with moderate atypia, margins free, left forearm, 2010  -Compound dysplastic nevus with mild atypia, left chest, 2010  -Compound dysplastic nevus with mild atypia, margins free, right inner arm, 2010  -Compound dysplastic nevus with moderate atypia, right ear, 2009  -Junctional dysplastic nevus with moderate atypia, right buttock, s/p excision 2009  -Junctional dysplastic nevus with moderate ayptia, negative margin, right posterior shoulder, 2009  -Inflamed junctional dysplastic nevus with moderate atypica, right superior shoulder, neg margins, 2009  -Junctional dysplastic nevus with moderate atypia, right anterior arm, 2009  -Junctional dysplastic nevus with moderate atypia, left superior shoulder, left inferior shoulder, left inner arm, 2009  -Dysplastic nevi, right chest and left breast  -Irritated compound nevus, left buttock with mild to moderate cystologic atypia, 2007  2. Acne vulgaris  -Current Tx: Tretinoin 0.025% cream  -Previous Tx:  Differin, minocycline with dyspigmentation, doxycycline  3. Halo nevus, right  neck, 2010  4. Benign biopsies  - Compound melanocytic nevus, left third toe, bx 12/22/20  5. Dermatitis, left lateral inferior flank  - Empiric TAC 0.1% cream, consider biopsy if fails to resolve after 1 month    Family History: Aunt with melanoma. Mom had vulvar sarcoma.  Social History:  for [employer]. Has 15 and 10 year old boys  ____________________________________________    ASSESSMENT/PLAN:    # History of DN: No evidence of recurrence. Many but all mild or moderate. While these are benign, they are a marker for increased melanoma risk.  - Recommended yearly skin checks.    # Sun damaged skin with solar lentigines. Chronic. Stable.   - Recommended sunscreens SPF #30 or greater, protective clothing and avoidance of tanning beds.     # Benign findings include: sebaceous hyperplasia, seborrheic keratoses, cherry angioma, dermatofibroma. Self limited, minor.   - No further intervention required. Patient to report changes.  - Patient reassured of the benign nature of these lesions.    # Multiple clinically benign nevi. Chronic. Stable.   - No further intervention required. Patient to report changes.  - Patient reassured of the benign nature of these lesions.    # Soft ill defined subcutaneous nodule, 4x4 cm on the left upper back. Ddx lipoma vs tight muscle.    - Will monitor for changes.    # Rash, left lateral flank. Not perfect for eczema, but will try empiric treating. Recommended triam 0.1% cream BID x4 weeks. If not resolved then, advised to call office for follow up appt.      Procedures Performed:   None.    Follow-up: 1 year(s) in-person, or earlier for new or changing lesions    Staff and Scribe:     Scribe Disclosure:   I, Bal Boyd, am serving as a scribe to document services personally performed by this physician, Dr. Pooja Thornton, based on data collection and the provider's statements to me.     Provider Disclosure:   The documentation recorded by the scribe  accurately reflects the services I personally performed and the decisions made by me.    Pooja Thornton MD    Department of Dermatology  [facility]: Phone: [phone], Fax:[phone]  [facility]: Phone: [phone], Fax: [phone]    ____________________________________________    CC: Skin Check (family hx of melanoma- personal hx of dn/ areas of concern- none)    HPI:  Ms. Ivania Gomez is a(n) 46 year old female who presents today as a return patient for a skin check.    Last seen 12/22/20 by Dr. Reaves for a skin check. At that time, a biopsy was taken from the upper mid back (DN with mild atypia) and left third toe (compound melanocytic nevus).     Today, she has no specific areas of concern. She noticed a rash on the left flank this morning. The area has not been itchy.    Patient is otherwise feeling well, without additional concerns.    Labs:  NA    Physical exam:  Vitals: LMP 04/29/2018 (Exact Date)   SKIN: Total skin excluding the undergarment areas was performed. The exam included the head/face, neck, both arms, chest, back, abdomen, both legs, buttocks, digits and/or nails.   - Al Type II  - Soft ill defined subcutaneous nodule, 4x4 cm on the left upper back  - Well healed scars in areas of past DN. No pigment recurrence.  - Scattered brown macules on sun exposed areas.  - There are yellow oily papules with central umbilication located on the face.  - There are dome shaped bright red papules on the trunk and extremities.   - Multiple regular brown pigmented macules and papules are identified on the trunk and extremities. Several on the trunk are larger than 5 mm in size with congenital appearance on dermoscopy. Greater than 100 nevi in all. Most are on the upper body.  - There is a waxy stuck on tan to brown papule on the left upper arm.  - There is a firm  tan/flesh colored papule that dimples with lateral pressure on the right lateral knee and left medial thigh.  - Pink xerotic papules on the left inferior lateral flank  - No other lesions of concern on areas examined.     Medications:  Current Outpatient Medications   Medication     cetirizine-pseudoePHEDrine ER (ZYRTEC-D ALLERGY & CONGESTION) 5-120 MG 12 hr tablet     citalopram (CELEXA) 20 MG tablet     fluticasone (FLONASE) 50 MCG/ACT spray     LORazepam (ATIVAN) 1 MG tablet     meclizine (ANTIVERT) 25 MG tablet     MELATONIN PO     metFORMIN (GLUCOPHAGE) 500 MG tablet     multivitamin, therapeutic with minerals (MULTI-VITAMIN) TABS     No current facility-administered medications for this visit.      Past Medical History:   Patient Active Problem List   Diagnosis     CARDIOVASCULAR SCREENING; LDL GOAL LESS THAN 160     Mild major depression (H)     PCOS (polycystic ovarian syndrome)     S/P laparoscopic cholecystectomy     Acne vulgaris     History of dysplastic nevus     Melanocytic nevus     History of gestational diabetes     Chronic rhinitis     Hypertriglyceridemia     Submucous leiomyoma of uterus     Excessive or frequent menstruation     Anxiety     Past Medical History:   Diagnosis Date     Acne vulgaris      Allergies      Anxiety      ASCUS on Pap smear 1994    colp benign     Depressive disorder     post partum and anxiety issues     Diabetes mellitus (H)      History of dysplastic nevus      Hoffa's fat pad disease (H)      Melanocytic nevus 6/14/2012     Obesity, unspecified      PCOS (polycystic ovarian syndrome)         CC No referring provider defined for this encounter. on close of this encounter.      Again, thank you for allowing me to participate in the care of your patient.        Sincerely,        Pooja Thornton MD

## 2022-04-08 ENCOUNTER — MYC MEDICAL ADVICE (OUTPATIENT)
Dept: FAMILY MEDICINE | Facility: CLINIC | Age: 47
End: 2022-04-08
Payer: COMMERCIAL

## 2022-04-08 DIAGNOSIS — I10 HYPERTENSION GOAL BP (BLOOD PRESSURE) < 140/90: Primary | ICD-10-CM

## 2022-04-11 RX ORDER — LISINOPRIL 10 MG/1
10 TABLET ORAL DAILY
Qty: 30 TABLET | Refills: 1 | Status: SHIPPED | OUTPATIENT
Start: 2022-04-11 | End: 2022-05-05

## 2022-05-05 ENCOUNTER — MYC MEDICAL ADVICE (OUTPATIENT)
Dept: FAMILY MEDICINE | Facility: CLINIC | Age: 47
End: 2022-05-05
Payer: COMMERCIAL

## 2022-05-05 DIAGNOSIS — I10 HYPERTENSION GOAL BP (BLOOD PRESSURE) < 140/90: ICD-10-CM

## 2022-05-05 RX ORDER — LISINOPRIL 20 MG/1
20 TABLET ORAL DAILY
Qty: 90 TABLET | Refills: 1 | Status: SHIPPED | OUTPATIENT
Start: 2022-05-05 | End: 2022-10-28

## 2022-06-13 ENCOUNTER — ANCILLARY PROCEDURE (OUTPATIENT)
Dept: MAMMOGRAPHY | Facility: CLINIC | Age: 47
End: 2022-06-13
Attending: FAMILY MEDICINE
Payer: COMMERCIAL

## 2022-06-13 DIAGNOSIS — Z12.31 VISIT FOR SCREENING MAMMOGRAM: ICD-10-CM

## 2022-06-13 PROCEDURE — 77063 BREAST TOMOSYNTHESIS BI: CPT | Mod: TC | Performed by: RADIOLOGY

## 2022-06-13 PROCEDURE — 77067 SCR MAMMO BI INCL CAD: CPT | Mod: TC | Performed by: RADIOLOGY

## 2022-08-16 RX ORDER — CITALOPRAM HYDROBROMIDE 20 MG/1
20 TABLET ORAL DAILY
Qty: 90 TABLET | Refills: 1 | Status: SHIPPED | OUTPATIENT
Start: 2022-08-16 | End: 2023-02-08

## 2022-08-16 ASSESSMENT — ANXIETY QUESTIONNAIRES
GAD7 TOTAL SCORE: 0
7. FEELING AFRAID AS IF SOMETHING AWFUL MIGHT HAPPEN: NOT AT ALL
1. FEELING NERVOUS, ANXIOUS, OR ON EDGE: NOT AT ALL
5. BEING SO RESTLESS THAT IT IS HARD TO SIT STILL: NOT AT ALL
GAD7 TOTAL SCORE: 0
7. FEELING AFRAID AS IF SOMETHING AWFUL MIGHT HAPPEN: NOT AT ALL
6. BECOMING EASILY ANNOYED OR IRRITABLE: NOT AT ALL
2. NOT BEING ABLE TO STOP OR CONTROL WORRYING: NOT AT ALL
3. WORRYING TOO MUCH ABOUT DIFFERENT THINGS: NOT AT ALL
4. TROUBLE RELAXING: NOT AT ALL
GAD7 TOTAL SCORE: 0

## 2022-08-30 ENCOUNTER — ALLIED HEALTH/NURSE VISIT (OUTPATIENT)
Dept: FAMILY MEDICINE | Facility: CLINIC | Age: 47
End: 2022-08-30

## 2022-08-30 ENCOUNTER — LAB (OUTPATIENT)
Dept: LAB | Facility: CLINIC | Age: 47
End: 2022-08-30
Payer: COMMERCIAL

## 2022-08-30 VITALS — HEART RATE: 82 BPM | SYSTOLIC BLOOD PRESSURE: 114 MMHG | DIASTOLIC BLOOD PRESSURE: 82 MMHG

## 2022-08-30 DIAGNOSIS — E28.2 PCOS (POLYCYSTIC OVARIAN SYNDROME): ICD-10-CM

## 2022-08-30 DIAGNOSIS — Z01.30 BLOOD PRESSURE CHECK: Primary | ICD-10-CM

## 2022-08-30 LAB
ANION GAP SERPL CALCULATED.3IONS-SCNC: 5 MMOL/L (ref 3–14)
BUN SERPL-MCNC: 5 MG/DL (ref 7–30)
CALCIUM SERPL-MCNC: 9.2 MG/DL (ref 8.5–10.1)
CHLORIDE BLD-SCNC: 104 MMOL/L (ref 94–109)
CO2 SERPL-SCNC: 28 MMOL/L (ref 20–32)
CREAT SERPL-MCNC: 0.67 MG/DL (ref 0.52–1.04)
GFR SERPL CREATININE-BSD FRML MDRD: >90 ML/MIN/1.73M2
GLUCOSE BLD-MCNC: 103 MG/DL (ref 70–99)
POTASSIUM BLD-SCNC: 3.8 MMOL/L (ref 3.4–5.3)
SODIUM SERPL-SCNC: 137 MMOL/L (ref 133–144)

## 2022-08-30 PROCEDURE — 80048 BASIC METABOLIC PNL TOTAL CA: CPT

## 2022-08-30 PROCEDURE — 36415 COLL VENOUS BLD VENIPUNCTURE: CPT

## 2022-08-31 DIAGNOSIS — E28.2 PCOS (POLYCYSTIC OVARIAN SYNDROME): ICD-10-CM

## 2022-10-28 DIAGNOSIS — I10 HYPERTENSION GOAL BP (BLOOD PRESSURE) < 140/90: ICD-10-CM

## 2022-10-28 DIAGNOSIS — E78.1 HYPERTRIGLYCERIDEMIA: Primary | ICD-10-CM

## 2022-10-28 RX ORDER — LISINOPRIL 20 MG/1
TABLET ORAL
Qty: 90 TABLET | Refills: 0 | Status: SHIPPED | OUTPATIENT
Start: 2022-10-28 | End: 2023-01-27

## 2022-12-22 ENCOUNTER — TRANSFERRED RECORDS (OUTPATIENT)
Dept: HEALTH INFORMATION MANAGEMENT | Facility: CLINIC | Age: 47
End: 2022-12-22

## 2023-01-27 DIAGNOSIS — I10 HYPERTENSION GOAL BP (BLOOD PRESSURE) < 140/90: ICD-10-CM

## 2023-01-27 RX ORDER — LISINOPRIL 20 MG/1
TABLET ORAL
Qty: 90 TABLET | Refills: 0 | Status: SHIPPED | OUTPATIENT
Start: 2023-01-27 | End: 2023-01-27

## 2023-01-27 RX ORDER — LISINOPRIL 20 MG/1
20 TABLET ORAL DAILY
Qty: 90 TABLET | Refills: 1 | Status: SHIPPED | OUTPATIENT
Start: 2023-01-27 | End: 2023-02-08

## 2023-02-06 ASSESSMENT — ENCOUNTER SYMPTOMS
NAUSEA: 0
ABDOMINAL PAIN: 0
HEMATOCHEZIA: 0
HEMATURIA: 0
BREAST MASS: 0
HEARTBURN: 0
FEVER: 0
DYSURIA: 0
JOINT SWELLING: 0
COUGH: 1
MYALGIAS: 0
WEAKNESS: 0
PARESTHESIAS: 0
NERVOUS/ANXIOUS: 0
HEADACHES: 0
ARTHRALGIAS: 0
CONSTIPATION: 0
DIARRHEA: 0
FREQUENCY: 0
DIZZINESS: 0
PALPITATIONS: 0
CHILLS: 0
EYE PAIN: 0
SHORTNESS OF BREATH: 0
SORE THROAT: 0

## 2023-02-07 ENCOUNTER — LAB (OUTPATIENT)
Dept: LAB | Facility: CLINIC | Age: 48
End: 2023-02-07
Payer: COMMERCIAL

## 2023-02-07 ENCOUNTER — DOCUMENTATION ONLY (OUTPATIENT)
Dept: LAB | Facility: CLINIC | Age: 48
End: 2023-02-07

## 2023-02-07 DIAGNOSIS — Z86.32 HISTORY OF GESTATIONAL DIABETES: ICD-10-CM

## 2023-02-07 DIAGNOSIS — E78.1 HYPERTRIGLYCERIDEMIA: ICD-10-CM

## 2023-02-07 DIAGNOSIS — I10 HYPERTENSION GOAL BP (BLOOD PRESSURE) < 140/90: ICD-10-CM

## 2023-02-07 LAB
ANION GAP SERPL CALCULATED.3IONS-SCNC: 6 MMOL/L (ref 3–14)
BUN SERPL-MCNC: 10 MG/DL (ref 7–30)
CALCIUM SERPL-MCNC: 9.8 MG/DL (ref 8.5–10.1)
CHLORIDE BLD-SCNC: 105 MMOL/L (ref 94–109)
CHOLEST SERPL-MCNC: 195 MG/DL
CO2 SERPL-SCNC: 27 MMOL/L (ref 20–32)
CREAT SERPL-MCNC: 0.7 MG/DL (ref 0.52–1.04)
FASTING STATUS PATIENT QL REPORTED: NO
GFR SERPL CREATININE-BSD FRML MDRD: >90 ML/MIN/1.73M2
GLUCOSE BLD-MCNC: 93 MG/DL (ref 70–99)
HDLC SERPL-MCNC: 37 MG/DL
HOLD SPECIMEN: NORMAL
HOLD SPECIMEN: NORMAL
LDLC SERPL CALC-MCNC: 106 MG/DL
NONHDLC SERPL-MCNC: 158 MG/DL
POTASSIUM BLD-SCNC: 4.4 MMOL/L (ref 3.4–5.3)
SODIUM SERPL-SCNC: 138 MMOL/L (ref 133–144)
TRIGL SERPL-MCNC: 262 MG/DL

## 2023-02-07 PROCEDURE — 36415 COLL VENOUS BLD VENIPUNCTURE: CPT

## 2023-02-07 PROCEDURE — 80061 LIPID PANEL: CPT

## 2023-02-07 PROCEDURE — 80048 BASIC METABOLIC PNL TOTAL CA: CPT

## 2023-02-07 PROCEDURE — 83036 HEMOGLOBIN GLYCOSYLATED A1C: CPT

## 2023-02-07 NOTE — PROGRESS NOTES
Ivania was seen in lab today for PVL, the orders were expected in October. If you need extra testing, we anders extra blood for PVL. If needed Please place add on orders.      Future Appointments   Date Time Provider Department Center   2/8/2023  8:25 AM [facility]     Thank You,  Kiki French

## 2023-02-08 ENCOUNTER — OFFICE VISIT (OUTPATIENT)
Dept: FAMILY MEDICINE | Facility: CLINIC | Age: 48
End: 2023-02-08
Payer: COMMERCIAL

## 2023-02-08 VITALS
SYSTOLIC BLOOD PRESSURE: 139 MMHG | OXYGEN SATURATION: 98 % | TEMPERATURE: 97.7 F | BODY MASS INDEX: 29.62 KG/M2 | HEIGHT: 65 IN | WEIGHT: 177.8 LBS | RESPIRATION RATE: 14 BRPM | HEART RATE: 100 BPM | DIASTOLIC BLOOD PRESSURE: 88 MMHG

## 2023-02-08 DIAGNOSIS — D17.30 LIPOMA OF SKIN AND SUBCUTANEOUS TISSUE: ICD-10-CM

## 2023-02-08 DIAGNOSIS — Z12.11 SPECIAL SCREENING FOR MALIGNANT NEOPLASMS, COLON: ICD-10-CM

## 2023-02-08 DIAGNOSIS — F33.0 MAJOR DEPRESSIVE DISORDER, RECURRENT EPISODE, MILD (H): ICD-10-CM

## 2023-02-08 DIAGNOSIS — Z00.00 ROUTINE GENERAL MEDICAL EXAMINATION AT A HEALTH CARE FACILITY: Primary | ICD-10-CM

## 2023-02-08 DIAGNOSIS — I10 HYPERTENSION GOAL BP (BLOOD PRESSURE) < 140/90: ICD-10-CM

## 2023-02-08 DIAGNOSIS — J30.2 SEASONAL ALLERGIC RHINITIS, UNSPECIFIED TRIGGER: ICD-10-CM

## 2023-02-08 DIAGNOSIS — Z86.32 HISTORY OF GESTATIONAL DIABETES: ICD-10-CM

## 2023-02-08 DIAGNOSIS — E28.2 PCOS (POLYCYSTIC OVARIAN SYNDROME): ICD-10-CM

## 2023-02-08 LAB — HBA1C MFR BLD: 5.3 % (ref 0–5.6)

## 2023-02-08 PROCEDURE — 99396 PREV VISIT EST AGE 40-64: CPT | Performed by: FAMILY MEDICINE

## 2023-02-08 PROCEDURE — 99214 OFFICE O/P EST MOD 30 MIN: CPT | Mod: 25 | Performed by: FAMILY MEDICINE

## 2023-02-08 PROCEDURE — 91312 COVID-19 VACCINE BIVALENT BOOSTER 12+ (PFIZER): CPT | Performed by: FAMILY MEDICINE

## 2023-02-08 PROCEDURE — 0124A COVID-19 VACCINE BIVALENT BOOSTER 12+ (PFIZER): CPT | Performed by: FAMILY MEDICINE

## 2023-02-08 RX ORDER — CETIRIZINE HYDROCHLORIDE, PSEUDOEPHEDRINE HYDROCHLORIDE 5; 120 MG/1; MG/1
TABLET, FILM COATED, EXTENDED RELEASE ORAL
Qty: 180 TABLET | Refills: 3 | Status: SHIPPED | OUTPATIENT
Start: 2023-02-08 | End: 2024-02-27

## 2023-02-08 RX ORDER — CITALOPRAM HYDROBROMIDE 20 MG/1
20 TABLET ORAL DAILY
Qty: 90 TABLET | Refills: 1 | Status: SHIPPED | OUTPATIENT
Start: 2023-02-08 | End: 2023-08-04

## 2023-02-08 RX ORDER — FLUTICASONE PROPIONATE 50 MCG
SPRAY, SUSPENSION (ML) NASAL
Qty: 48 ML | Refills: 3 | Status: SHIPPED | OUTPATIENT
Start: 2023-02-08 | End: 2024-03-21

## 2023-02-08 RX ORDER — LOSARTAN POTASSIUM 50 MG/1
50 TABLET ORAL DAILY
Qty: 90 TABLET | Refills: 1 | Status: SHIPPED | OUTPATIENT
Start: 2023-02-08 | End: 2023-08-04

## 2023-02-08 ASSESSMENT — ENCOUNTER SYMPTOMS
PARESTHESIAS: 0
DIARRHEA: 0
HEMATOCHEZIA: 0
BREAST MASS: 0
ABDOMINAL PAIN: 0
WEAKNESS: 0
MYALGIAS: 0
ARTHRALGIAS: 0
SORE THROAT: 0
JOINT SWELLING: 0
PALPITATIONS: 0
CHILLS: 0
NAUSEA: 0
COUGH: 1
FREQUENCY: 0
SHORTNESS OF BREATH: 0
DIZZINESS: 0
DYSURIA: 0
NERVOUS/ANXIOUS: 0
FEVER: 0
CONSTIPATION: 0
HEARTBURN: 0
HEMATURIA: 0
EYE PAIN: 0
HEADACHES: 0

## 2023-02-08 NOTE — PROGRESS NOTES
SUBJECTIVE:   CC: Ivania is an 47 year old who presents for preventive health visit.     Patient has been advised of split billing requirements and indicates understanding: Yes  Healthy Habits:     Getting at least 3 servings of Calcium per day:  Yes    Bi-annual eye exam:  NO    Dental care twice a year:  Yes    Sleep apnea or symptoms of sleep apnea:  None    Diet:  Regular (no restrictions)    Frequency of exercise:  2-3 days/week    Duration of exercise:  15-30 minutes    Taking medications regularly:  Yes    Medication side effects:  Other    PHQ-2 Total Score: 0    Additional concerns today:  No              Today's PHQ-2 Score:   PHQ-2 ( 1999 Pfizer) 2/6/2023   Q1: Little interest or pleasure in doing things 0   Q2: Feeling down, depressed or hopeless 0   PHQ-2 Score 0   PHQ-2 Total Score (12-17 Years)- Positive if 3 or more points; Administer PHQ-A if positive -   Q1: Little interest or pleasure in doing things Not at all   Q2: Feeling down, depressed or hopeless Not at all   PHQ-2 Score 0           Social History     Tobacco Use     Smoking status: Never     Smokeless tobacco: Never     Tobacco comments:     nonsmoking household   Substance Use Topics     Alcohol use: Yes     Comment: occasional         Alcohol Use 2/6/2023   Prescreen: >3 drinks/day or >7 drinks/week? No   Prescreen: >3 drinks/day or >7 drinks/week? -       Reviewed orders with patient.  Reviewed health maintenance and updated orders accordingly - Yes    G 2 P 2   Patient's last menstrual period was 04/29/2018 (exact date).     Fasting: No   Td: tdap 2/7/2022       Flu: 10/2022      Covid: 2/8/23 - booster given today       Shingrix: NA      PPV: NA      Last 3 Pap and HPV Results:   PAP / HPV Latest Ref Rng & Units 7/13/2017 4/11/2014 9/7/2011   PAP (Historical) - NIL NIL NIL   HPV16 NEG Negative - -   HPV18 NEG Negative - -   HRHPV NEG Negative - -     Status post benign hysterectomy. Health Maintenance and Surgical History  updated.               Cholesterol:   Lab Results   Component Value Date    CHOL 195 2023    CHOL 191 2020     Lab Results   Component Value Date    HDL 37 2023    HDL 40 2020     Lab Results   Component Value Date     2023     2020     Lab Results   Component Value Date    TRIG 262 2023    TRIG 174 2020     Lab Results   Component Value Date    CHOLHDLRATIO 5.2 2015     The 10-year ASCVD risk score (Michelle FOREMAN, et al., 2019) is: 2.6%    Values used to calculate the score:      Age: 47 years      Sex: Female      Is Non- : No      Diabetic: No      Tobacco smoker: No      Systolic Blood Pressure: 139 mmHg      Is BP treated: Yes      HDL Cholesterol: 37 mg/dL      Total Cholesterol: 195 mg/dL     MM2022  Dexa:  NA     Flex/colo: ordered      Seat Belt: Yes    Sunscreen use: Yes   Calcium Intake: adeq  Health Care Directive: Yes   Sexually Active: Yes     Current contraception: hysterectomy  History of abnormal Pap smear: No  Family history of colon/breast/ovarian cancer: No  Regular self breast exam: Yes  History of abnormal mammogram: No      Labs reviewed in EPIC  BP Readings from Last 3 Encounters:   22 114/82   22 (!) 140/102   21 124/78    Wt Readings from Last 3 Encounters:   22 83.5 kg (184 lb)   21 83.9 kg (185 lb)   19 82.5 kg (181 lb 12.8 oz)                  Patient Active Problem List   Diagnosis     CARDIOVASCULAR SCREENING; LDL GOAL LESS THAN 160     Mild major depression (H)     PCOS (polycystic ovarian syndrome)     S/P laparoscopic cholecystectomy     Acne vulgaris     History of dysplastic nevus     Melanocytic nevus     History of gestational diabetes     Chronic rhinitis     Hypertriglyceridemia     Submucous leiomyoma of uterus     Excessive or frequent menstruation     Anxiety     Past Surgical History:   Procedure Laterality Date     BIOPSY      moles removed      CHOLECYSTECTOMY  2011     COLPOSCOPY CERVIX, BIOPSY CERVIX, ENDOCERVICAL CURETTAGE, COMBINED  1994    benign     EYE SURGERY  2009    lasix     EYE SURGERY      Lasik     HYSTERECTOMY, PAP NO LONGER INDICATED  05/25/2018    hysterectomy with bilateral salpingectomy, ovaries remain       Social History     Tobacco Use     Smoking status: Never     Smokeless tobacco: Never     Tobacco comments:     nonsmoking household   Substance Use Topics     Alcohol use: Yes     Comment: occasional     Family History   Problem Relation Age of Onset     Cancer Mother         vulvar sarcoma     Hypertension Mother      Other Cancer Mother         vulvar sarcoma     Thyroid Disease Mother      Colon Polyps Father      Thyroid Disease Sister      Anxiety Disorder Sister      Thyroid Disease Sister      Hyperlipidemia Brother      Hypertension Maternal Grandmother      Cancer Maternal Grandfather         lung     Prostate Cancer Paternal Grandfather          Current Outpatient Medications   Medication Sig Dispense Refill     cetirizine-pseudoePHEDrine ER (ZYRTEC-D ALLERGY & CONGESTION) 5-120 MG 12 hr tablet TAKE 1 TABLET BY MOUTH TWICE A  tablet 3     citalopram (CELEXA) 20 MG tablet Take 1 tablet (20 mg) by mouth daily 90 tablet 1     fluticasone (FLONASE) 50 MCG/ACT spray SPRAY 1 SPRAY INTO BOTH NOSTRILS 2 TIMES DAILY 48 mL 3     lisinopril (ZESTRIL) 20 MG tablet Take 1 tablet (20 mg) by mouth daily 90 tablet 1     LORazepam (ATIVAN) 1 MG tablet Take 1 tablet 30 minutes prior to flight and as needed for severe anxiety. 10 tablet 0     meclizine (ANTIVERT) 25 MG tablet Take 1 tablet (25 mg) by mouth 3 times daily as needed for dizziness 90 tablet 1     MELATONIN PO Take 5 mg by mouth       metFORMIN (GLUCOPHAGE) 500 MG tablet TAKE 1 TABLET BY MOUTH TWICE A DAY WITH MEALS 180 tablet 1     multivitamin, therapeutic with minerals (MULTI-VITAMIN) TABS Take 1 tablet by mouth daily       triamcinolone (KENALOG) 0.1 % external  ointment Apply thin layer twice daily to rash until resolved or up to 6 weeks. 30 g 2       Breast Cancer Screening:    Breast CA Risk Assessment (FHS-7) 2/4/2022   Do you have a family history of breast, colon, or ovarian cancer? No / Unknown         Mammogram Screening: Recommended annual mammography  Pertinent mammograms are reviewed under the imaging tab.      Reviewed and updated as needed this visit by clinical staff   Tobacco  Allergies  Meds  Problems  Med Hx  Surg Hx  Fam Hx          Reviewed and updated as needed this visit by Provider   Tobacco  Allergies  Meds  Problems  Med Hx  Surg Hx  Fam Hx             Review of Systems   Constitutional: Negative for chills and fever.   HENT: Negative for congestion, ear pain, hearing loss and sore throat.    Eyes: Negative for pain and visual disturbance.   Respiratory: Positive for cough. Negative for shortness of breath.    Cardiovascular: Negative for chest pain, palpitations and peripheral edema.   Gastrointestinal: Negative for abdominal pain, constipation, diarrhea, heartburn, hematochezia and nausea.   Breasts:  Negative for tenderness, breast mass and discharge.   Genitourinary: Negative for dysuria, frequency, genital sores, hematuria, pelvic pain, urgency, vaginal bleeding and vaginal discharge.   Musculoskeletal: Negative for arthralgias, joint swelling and myalgias.   Skin: Negative for rash.   Neurological: Negative for dizziness, weakness, headaches and paresthesias.   Psychiatric/Behavioral: Negative for mood changes. The patient is not nervous/anxious.      Patient notes a cough that has been worsening over the last couple months. Waking her up more at night. Describes cough as dry.           OBJECTIVE:   LMP 04/29/2018 (Exact Date)   Physical Exam  Skin:             GENERAL: healthy, alert and no distress  EYES: Eyes grossly normal to inspection, PERRL and conjunctivae and sclerae normal  HENT: ear canals and TM's normal, nose and  mouth without ulcers or lesions  NECK: no adenopathy, no asymmetry, masses, or scars and thyroid normal to palpation  RESP: lungs clear to auscultation - no rales, rhonchi or wheezes  BREAST: normal without masses, tenderness or nipple discharge and no palpable axillary masses or adenopathy  CV: regular rate and rhythm, normal S1 S2, no S3 or S4, no murmur, click or rub, no peripheral edema and peripheral pulses strong  ABDOMEN: soft, nontender, no hepatosplenomegaly, no masses and bowel sounds normal  MS: no gross musculoskeletal defects noted, no edema  SKIN: no suspicious lesions or rashes, two masses on back suspicious for Lipoma, soft and moveable left upper back - 9x 5 cm mass, right upper back 7x4 cm mass   NEURO: Normal strength and tone, mentation intact and speech normal  PSYCH: mentation appears normal, affect normal/bright    Diagnostic Test Results:  Labs reviewed in Epic    ASSESSMENT/PLAN:     (Z00.00) Routine general medical examination at a health care facility  (primary encounter diagnosis)  Comment: preventative health recommendations reviewed, immunizations reviewed, orders reviewed  Plan: COVID booster today  see orders in Epic.       (J30.2) Seasonal allergic rhinitis, unspecified trigger  Comment: Patient notes control on her current management  Plan: cetirizine-pseudoePHEDrine ER (ZYRTEC-D ALLERGY        & CONGESTION) 5-120 MG 12 hr tablet,         OFFICE/OUTPT VISIT,EST,LEVL IV, fluticasone         (FLONASE) 50 MCG/ACT nasal spray        Refilled x 1 year    (F33.0) Major depressive disorder, recurrent episode, mild (H)  Comment: Patient notes moods are controlled on current management  Plan: citalopram (CELEXA) 20 MG tablet, OFFICE/OUTPT         VISIT,EST,LEVL IV        Refilled x 6 months     (E28.2) PCOS (polycystic ovarian syndrome)  Comment: Controlled on current management  Plan: metFORMIN (GLUCOPHAGE) 500 MG tablet,         OFFICE/OUTPT VISIT,EST,LEVL IV        Refilled x6 months, B12  ordered for future due to Metformin use    (I10) Hypertension goal BP (blood pressure) < 140/90  Comment: patient noting dry cough, interfering with sleep- change from Lisinopril to Losartan  Plan: OFFICE/OUTPT VISIT,EST,LEVL IV, losartan         (COZAAR) 50 MG tablet        Change in therapy- sent to pharmacy for 6 months    (D17.30) Lipoma of skin and subcutaneous tissue  Comment: not painful, bothersome.  occas notes in mirror  Plan: reassurance benign nature  Refer to general surgery for removal upon pt request.     (Z86.32) History of gestational diabetes  Comment: increase risk of DM- monitoring   Plan: OFFICE/OUTPT VISIT,EST,LEVL IV, Hemoglobin A1c        Check A1C at next visit     (Z12.11) Special screening for malignant neoplasms, colon  Comment: Patient would like to get a colonoscopy over the summer when she is off work  Plan: Colonoscopy Screening  Referral        Orders in chart, patient will call to schedule            COUNSELING:  Reviewed preventive health counseling, as reflected in patient instructions  Special attention given to:        Regular exercise       Healthy diet/nutrition        She reports that she has never smoked. She has never used smokeless tobacco.          Jacquelyn Zaldivar MD  [facility]

## 2023-05-17 ENCOUNTER — OFFICE VISIT (OUTPATIENT)
Dept: DERMATOLOGY | Facility: CLINIC | Age: 48
End: 2023-05-17
Payer: COMMERCIAL

## 2023-05-17 DIAGNOSIS — L82.1 SEBORRHEIC KERATOSES: ICD-10-CM

## 2023-05-17 DIAGNOSIS — L81.4 LENTIGINES: ICD-10-CM

## 2023-05-17 DIAGNOSIS — D48.9 NEOPLASM OF UNCERTAIN BEHAVIOR: ICD-10-CM

## 2023-05-17 DIAGNOSIS — D17.1 LIPOMA OF TORSO: ICD-10-CM

## 2023-05-17 DIAGNOSIS — Z86.018 HISTORY OF DYSPLASTIC NEVUS: Primary | ICD-10-CM

## 2023-05-17 DIAGNOSIS — D18.01 CHERRY ANGIOMA: ICD-10-CM

## 2023-05-17 DIAGNOSIS — D22.9 MULTIPLE BENIGN NEVI: ICD-10-CM

## 2023-05-17 DIAGNOSIS — B07.8 OTHER VIRAL WARTS: ICD-10-CM

## 2023-05-17 PROCEDURE — 17110 DESTRUCTION B9 LES UP TO 14: CPT | Performed by: PHYSICIAN ASSISTANT

## 2023-05-17 PROCEDURE — 99213 OFFICE O/P EST LOW 20 MIN: CPT | Mod: 25 | Performed by: PHYSICIAN ASSISTANT

## 2023-05-17 PROCEDURE — 11102 TANGNTL BX SKIN SINGLE LES: CPT | Mod: XS | Performed by: PHYSICIAN ASSISTANT

## 2023-05-17 PROCEDURE — 11103 TANGNTL BX SKIN EA SEP/ADDL: CPT | Mod: XS | Performed by: PHYSICIAN ASSISTANT

## 2023-05-17 PROCEDURE — 88305 TISSUE EXAM BY PATHOLOGIST: CPT | Performed by: DERMATOLOGY

## 2023-05-17 ASSESSMENT — PAIN SCALES - GENERAL: PAINLEVEL: NO PAIN (0)

## 2023-05-17 NOTE — LETTER
5/17/2023         RE: Ivania Gomez  [id number] [address] [facility] [address]        Dear Colleague,    Thank you for referring your patient, Ivania Gomez, to the [facility]. Please see a copy of my visit note below.    [facility] Dermatology Note  Encounter Date: May 17, 2023  Office Visit     Dermatology Problem List:  Last skin check 5/2023, recommended yearly  0. NUB x2 - mid lower back, R abdomen  1. History of dysplastic nevi  -Compound dysplastic nevus with mild atypia, upper mid back, bx 12/22/20  -Collision of two compound dysplastic nevi, both with mild atypia, left upper arm, s/p biopsy 5/17/2016  -Compound dysplastic melanocytic nevus, central chest, 6/14/2012    -Junctional dysplastic nevus with moderate atypia, margins narrowly free, 2010, right arm medial  -Compound dysplastic nevus with mild atypia, margins free, 2010, right arm lateral  -Compound dysplastic nevus with moderate atypia, margins free, left forearm, 2010  -Compound dysplastic nevus with mild atypia, left chest, 2010  -Compound dysplastic nevus with mild atypia, margins free, right inner arm, 2010  -Compound dysplastic nevus with moderate atypia, right ear, 2009  -Junctional dysplastic nevus with moderate atypia, right buttock, s/p excision 2009  -Junctional dysplastic nevus with moderate ayptia, negative margin, right posterior shoulder, 2009  -Inflamed junctional dysplastic nevus with moderate atypica, right superior shoulder, neg margins, 2009  -Junctional dysplastic nevus with moderate atypia, right anterior arm, 2009  -Junctional dysplastic nevus with moderate atypia, left superior shoulder, left inferior shoulder, left inner arm, 2009  -Dysplastic nevi, right chest and left breast  -Irritated compound nevus, left buttock with mild to moderate cystologic atypia, 2007  2. Acne vulgaris  -Current Tx: Tretinoin 0.025% cream  -Previous Tx:  Differin, minocycline with  dyspigmentation, doxycycline  3. Halo nevus, right neck, 2010  4. Benign biopsies  - Compound melanocytic nevus, left third toe, bx 12/22/20  5. Dermatitis, left lateral inferior flank  - Empiric TAC 0.1% cream, consider biopsy if fails to resolve after 1 month     Family History: Aunt with melanoma. Mom had vulvar sarcoma.  Social History:  for [employer]. Has 2 boys.  ____________________________________________     ASSESSMENT/PLAN:     # History of dysplastic nevi, no clinical evidence of recurrence.  - ABCDEs: Counseled ABCDEs of melanoma: Asymmetry, Border (irregularity), Color (not uniform, changes in color), Diameter (greater than 6 mm which is about the size of a pencil eraser), and Evolving (any changes in preexisting moles).  - Sun protection: Counseled SPF30+ sunscreen, UPF clothing, sun avoidance, tanning bed avoidance.  - Recommended regular skin checks.      # Soft ill defined subcutaneous mass x2, 4x4 cm on the left upper back and R upper back. Ddx lipoma    - Will monitor for changes.     # NUB x2 - mid lower back, R abdomen  - shave bx x2 - see below    # Verruca plantaris.   - R plantar foot x 1  - cryo today - see below     # Multiple clinically benign nevi on the trunk and extremities. No treatment is necessary at this time unless the lesion changes or becomes symptomatic.    - ABCDEs of melanoma were discussed and self skin checks were advised.    # Seborrheic keratosis, non irritated on the trunk and extremities. Explained to patient benign nature of lesion. No treatment is necessary at this time unless the lesion changes or becomes symptomatic.     - Monitor for changes.    # Cherry Angiomas - trunk and extremities. Explained to patient benign nature of lesion. No treatment is necessary at this time unless the lesion changes or becomes symptomatic.      # Solar lentigines on the sun exposed skin areas. Benign nature was discussed. No further intervention required at  this time.    - Sun precaution was advised including the use of sun screens of SPF 30 or higher, sun protective clothing, and avoidance of tanning beds.        Procedures Performed:   - shave x 2 -After discussion of benefits and risks including but not limited to bleeding, infection, scar, incomplete removal, recurrence, and non-diagnostic biopsy, written consent and photographs were obtained. The area was cleaned with isopropyl alcohol. 2mL of 1% lidocaine with epinephrine was injected to obtain adequate anesthesia of the lesion on the R abdomen and mid lower back. A shave biopsy was performed. Hemostasis was achieved with aluminium chloride. Vaseline and a sterile dressing were applied. The patient tolerated the procedure and no complications were noted. The patient was provided with verbal and written post care instructions.     - Cryotherapy procedure note: After verbal consent and discussion of risks and benefits including but no limited to dyspigmentation/scar, blister, and pain, 1 was(were) treated with 1-2mm freeze border for 2 cycles with liquid nitrogen. Post cryotherapy instructions were provided.     Follow-up: 1 year(s) in-person, or earlier for new or changing lesions    Staff and Scribe:     Scribe Disclosure:   I, Bacilio Torrez, am serving as a scribe to document services personally performed by this physician, Tayler Montalvo PA-C, based on data collection and the provider's statements to me.   Provider Disclosure:   The documentation recorded by the scribe accurately reflects the services I personally performed and the decisions made by me.    All risks, benefits and alternatives were discussed with patient.  Patient is in agreement and understands the assessment and plan.  All questions were answered.    Tayler Montalvo PA-C, RUSTS  [facility] [place]: Phone: [phone], Fax: [phone]  [facility]: Phone:  [phone],  Fax: [phone]  [facility]: Phone: [phone], Fax: [phone]  ____________________________________________    CC: No chief complaint on file.    HPI:  Ms. Ivania Gomez is a(n) 47 year old female who presents today as a return patient for [facility].     Last seen 3/10/22 by Dr. Thornton for a skin check. At that time, pt was recommended to apply triamcinolone 0.1% cream twice daily for 4 weeks for rash on the left lateral flank.    Today, notes wart like lesion on the foot as well as red bump that bleeds often when she catches it on clothing.    Patient is otherwise feeling well, without additional skin concerns.    Labs Reviewed:  N/A    Physical Exam:  Vitals: LMP 04/29/2018 (Exact Date)   SKIN: Full skin, which includes the head/face, both arms, chest, back, abdomen,both legs, genitalia and/or groin buttocks, digits and/or nails, was examined.  - Al type II.  - mid lower back - 8mm brown macule  - R abdomen - 7mm erythematous lesion  - numerous scars on arms and chest - no repigmentation noted  - There are dome shaped bright red papules on the trunk and extremities.   - Multiple regular brown pigmented macules and papules are identified on the trunk and extremities, >100.  - Scattered brown macules on sun exposed areas.  - There are waxy stuck on tan to brown papules on the trunk and extremities.    - There is a verrucous papule with thrombosed capillaries interrupting dermatoglyphics on the R plantar foot x1..   - upper back - 2 4x4 mobile subcutaneous masses  - No other lesions of concern on areas examined.               Medications:  Current Outpatient Medications   Medication     cetirizine-pseudoePHEDrine ER (ZYRTEC-D ALLERGY & CONGESTION) 5-120 MG 12 hr tablet     citalopram (CELEXA) 20 MG tablet     fluticasone (FLONASE) 50 MCG/ACT nasal spray     LORazepam (ATIVAN) 1 MG tablet     losartan (COZAAR) 50 MG tablet     meclizine (ANTIVERT) 25 MG tablet      MELATONIN PO     metFORMIN (GLUCOPHAGE) 500 MG tablet     multivitamin w/minerals (THERA-VIT-M) tablet     triamcinolone (KENALOG) 0.1 % external ointment     No current facility-administered medications for this visit.      Past Medical History:   Patient Active Problem List   Diagnosis     CARDIOVASCULAR SCREENING; LDL GOAL LESS THAN 160     Mild major depression (H)     PCOS (polycystic ovarian syndrome)     S/P laparoscopic cholecystectomy     Acne vulgaris     History of dysplastic nevus     Melanocytic nevus     History of gestational diabetes     Chronic rhinitis     Hypertriglyceridemia     Submucous leiomyoma of uterus     Excessive or frequent menstruation     Anxiety     Hypertension goal BP (blood pressure) < 140/90     Major depressive disorder, recurrent episode, mild (H)     Seasonal allergic rhinitis, unspecified trigger     Past Medical History:   Diagnosis Date     Acne vulgaris      Allergies      Anxiety      ASCUS on Pap smear 1994    colp benign     Depressive disorder     post partum and anxiety issues     Diabetes mellitus (H)      History of dysplastic nevus      Hoffa's fat pad disease (H)      Hypertension goal BP (blood pressure) < 140/90 2/8/2023     Melanocytic nevus 6/14/2012     Obesity, unspecified      PCOS (polycystic ovarian syndrome)        Again, thank you for allowing me to participate in the care of your patient.        Sincerely,        Tayler Montalvo PA-C

## 2023-05-17 NOTE — PROGRESS NOTES
[facility] Dermatology Note  Encounter Date: May 17, 2023  Office Visit     Dermatology Problem List:  Last skin check 5/2023, recommended yearly  0. NUB x2 - mid lower back, R abdomen  1. History of dysplastic nevi  -Compound dysplastic nevus with mild atypia, upper mid back, bx 12/22/20  -Collision of two compound dysplastic nevi, both with mild atypia, left upper arm, s/p biopsy 5/17/2016  -Compound dysplastic melanocytic nevus, central chest, 6/14/2012    -Junctional dysplastic nevus with moderate atypia, margins narrowly free, 2010, right arm medial  -Compound dysplastic nevus with mild atypia, margins free, 2010, right arm lateral  -Compound dysplastic nevus with moderate atypia, margins free, left forearm, 2010  -Compound dysplastic nevus with mild atypia, left chest, 2010  -Compound dysplastic nevus with mild atypia, margins free, right inner arm, 2010  -Compound dysplastic nevus with moderate atypia, right ear, 2009  -Junctional dysplastic nevus with moderate atypia, right buttock, s/p excision 2009  -Junctional dysplastic nevus with moderate ayptia, negative margin, right posterior shoulder, 2009  -Inflamed junctional dysplastic nevus with moderate atypica, right superior shoulder, neg margins, 2009  -Junctional dysplastic nevus with moderate atypia, right anterior arm, 2009  -Junctional dysplastic nevus with moderate atypia, left superior shoulder, left inferior shoulder, left inner arm, 2009  -Dysplastic nevi, right chest and left breast  -Irritated compound nevus, left buttock with mild to moderate cystologic atypia, 2007  2. Acne vulgaris  -Current Tx: Tretinoin 0.025% cream  -Previous Tx:  Differin, minocycline with dyspigmentation, doxycycline  3. Halo nevus, right neck, 2010  4. Benign biopsies  - Compound melanocytic nevus, left third toe, bx 12/22/20  5. Dermatitis, left lateral inferior flank  - Empiric TAC 0.1% cream, consider biopsy if fails to resolve after 1  month     Family History: Aunt with melanoma. Mom had vulvar sarcoma.  Social History:  for [employer]. Has 2 boys.  ____________________________________________     ASSESSMENT/PLAN:     # History of dysplastic nevi, no clinical evidence of recurrence.  - ABCDEs: Counseled ABCDEs of melanoma: Asymmetry, Border (irregularity), Color (not uniform, changes in color), Diameter (greater than 6 mm which is about the size of a pencil eraser), and Evolving (any changes in preexisting moles).  - Sun protection: Counseled SPF30+ sunscreen, UPF clothing, sun avoidance, tanning bed avoidance.  - Recommended regular skin checks.      # Soft ill defined subcutaneous mass x2, 4x4 cm on the left upper back and R upper back. Ddx lipoma    - Will monitor for changes.     # NUB x2 - mid lower back, R abdomen  - shave bx x2 - see below    # Verruca plantaris.   - R plantar foot x 1  - cryo today - see below     # Multiple clinically benign nevi on the trunk and extremities. No treatment is necessary at this time unless the lesion changes or becomes symptomatic.    - ABCDEs of melanoma were discussed and self skin checks were advised.    # Seborrheic keratosis, non irritated on the trunk and extremities. Explained to patient benign nature of lesion. No treatment is necessary at this time unless the lesion changes or becomes symptomatic.     - Monitor for changes.    # Cherry Angiomas - trunk and extremities. Explained to patient benign nature of lesion. No treatment is necessary at this time unless the lesion changes or becomes symptomatic.      # Solar lentigines on the sun exposed skin areas. Benign nature was discussed. No further intervention required at this time.    - Sun precaution was advised including the use of sun screens of SPF 30 or higher, sun protective clothing, and avoidance of tanning beds.        Procedures Performed:   - shave x 2 -After discussion of benefits and risks including but not  limited to bleeding, infection, scar, incomplete removal, recurrence, and non-diagnostic biopsy, written consent and photographs were obtained. The area was cleaned with isopropyl alcohol. 2mL of 1% lidocaine with epinephrine was injected to obtain adequate anesthesia of the lesion on the R abdomen and mid lower back. A shave biopsy was performed. Hemostasis was achieved with aluminium chloride. Vaseline and a sterile dressing were applied. The patient tolerated the procedure and no complications were noted. The patient was provided with verbal and written post care instructions.     - Cryotherapy procedure note: After verbal consent and discussion of risks and benefits including but no limited to dyspigmentation/scar, blister, and pain, 1 was(were) treated with 1-2mm freeze border for 2 cycles with liquid nitrogen. Post cryotherapy instructions were provided.     Follow-up: 1 year(s) in-person, or earlier for new or changing lesions    Staff and Scribe:     Scribe Disclosure:   I, Bacilio Torrez, am serving as a scribe to document services personally performed by this physician, Tayler Montalvo PA-C, based on data collection and the provider's statements to me.   Provider Disclosure:   The documentation recorded by the scribe accurately reflects the services I personally performed and the decisions made by me.    All risks, benefits and alternatives were discussed with patient.  Patient is in agreement and understands the assessment and plan.  All questions were answered.    Tayler Montalvo PA-C, [facility]  [facility] [place]: Phone: [phone], Fax: [phone]  [facility]: Phone: [phone],  Fax: [phone]  [facility]: Phone: [phone], Fax: [phone]  ____________________________________________    CC: No chief complaint on file.    HPI:  Ms. Ivania Gomez is a(n) 47 year old female who presents today  as a return patient for FBSE.     Last seen 3/10/22 by Dr. Thornton for a skin check. At that time, pt was recommended to apply triamcinolone 0.1% cream twice daily for 4 weeks for rash on the left lateral flank.    Today, notes wart like lesion on the foot as well as red bump that bleeds often when she catches it on clothing.    Patient is otherwise feeling well, without additional skin concerns.    Labs Reviewed:  N/A    Physical Exam:  Vitals: LMP 04/29/2018 (Exact Date)   SKIN: Full skin, which includes the head/face, both arms, chest, back, abdomen,both legs, genitalia and/or groin buttocks, digits and/or nails, was examined.  - Al type II.  - mid lower back - 8mm brown macule  - R abdomen - 7mm erythematous lesion  - numerous scars on arms and chest - no repigmentation noted  - There are dome shaped bright red papules on the trunk and extremities.   - Multiple regular brown pigmented macules and papules are identified on the trunk and extremities, >100.  - Scattered brown macules on sun exposed areas.  - There are waxy stuck on tan to brown papules on the trunk and extremities.    - There is a verrucous papule with thrombosed capillaries interrupting dermatoglyphics on the R plantar foot x1..   - upper back - 2 4x4 mobile subcutaneous masses  - No other lesions of concern on areas examined.               Medications:  Current Outpatient Medications   Medication     cetirizine-pseudoePHEDrine ER (ZYRTEC-D ALLERGY & CONGESTION) 5-120 MG 12 hr tablet     citalopram (CELEXA) 20 MG tablet     fluticasone (FLONASE) 50 MCG/ACT nasal spray     LORazepam (ATIVAN) 1 MG tablet     losartan (COZAAR) 50 MG tablet     meclizine (ANTIVERT) 25 MG tablet     MELATONIN PO     metFORMIN (GLUCOPHAGE) 500 MG tablet     multivitamin w/minerals (THERA-VIT-M) tablet     triamcinolone (KENALOG) 0.1 % external ointment     No current facility-administered medications for this visit.      Past Medical History:   Patient  Active Problem List   Diagnosis     CARDIOVASCULAR SCREENING; LDL GOAL LESS THAN 160     Mild major depression (H)     PCOS (polycystic ovarian syndrome)     S/P laparoscopic cholecystectomy     Acne vulgaris     History of dysplastic nevus     Melanocytic nevus     History of gestational diabetes     Chronic rhinitis     Hypertriglyceridemia     Submucous leiomyoma of uterus     Excessive or frequent menstruation     Anxiety     Hypertension goal BP (blood pressure) < 140/90     Major depressive disorder, recurrent episode, mild (H)     Seasonal allergic rhinitis, unspecified trigger     Past Medical History:   Diagnosis Date     Acne vulgaris      Allergies      Anxiety      ASCUS on Pap smear 1994    colp benign     Depressive disorder     post partum and anxiety issues     Diabetes mellitus (H)      History of dysplastic nevus      Hoffa's fat pad disease (H)      Hypertension goal BP (blood pressure) < 140/90 2/8/2023     Melanocytic nevus 6/14/2012     Obesity, unspecified      PCOS (polycystic ovarian syndrome)

## 2023-05-17 NOTE — NURSING NOTE
The following medication was given:     MEDICATION:  Lidocaine with epinephrine 1% 1:[id number]  ROUTE: SQ  SITE: see procedure note  DOSE: 2ml  LOT #: [id number]  : [employer]  EXPIRATION DATE: 9/30/2024  NDC#: 88194-402-84  Was there drug waste? No    Multi-dose vial: Yes    Lizz Potts  May 17, 2023

## 2023-05-17 NOTE — NURSING NOTE
Ivania Gomez's goals for this visit include:   Chief Complaint   Patient presents with     Skin Check     FBSE. Area of concern: none.        She requests these members of her care team be copied on today's visit information:       PCP: Jacquelyn Zaldivar    Referring Provider:  No referring provider defined for this encounter.    LMP 04/29/2018 (Exact Date)     Do you need any medication refills at today's visit? No    Kiki Medina CMA on 5/17/2023 at 8:40 AM

## 2023-05-20 LAB
PATH REPORT.COMMENTS IMP SPEC: NORMAL
PATH REPORT.COMMENTS IMP SPEC: NORMAL
PATH REPORT.FINAL DX SPEC: NORMAL
PATH REPORT.GROSS SPEC: NORMAL
PATH REPORT.MICROSCOPIC SPEC OTHER STN: NORMAL
PATH REPORT.RELEVANT HX SPEC: NORMAL

## 2023-08-04 DIAGNOSIS — E28.2 PCOS (POLYCYSTIC OVARIAN SYNDROME): ICD-10-CM

## 2023-08-04 DIAGNOSIS — I10 HYPERTENSION GOAL BP (BLOOD PRESSURE) < 140/90: ICD-10-CM

## 2023-08-04 DIAGNOSIS — F33.0 MAJOR DEPRESSIVE DISORDER, RECURRENT EPISODE, MILD (H): ICD-10-CM

## 2023-08-04 RX ORDER — LOSARTAN POTASSIUM 50 MG/1
50 TABLET ORAL DAILY
Qty: 90 TABLET | Refills: 1 | Status: SHIPPED | OUTPATIENT
Start: 2023-08-04 | End: 2024-02-05

## 2023-08-04 RX ORDER — CITALOPRAM HYDROBROMIDE 20 MG/1
20 TABLET ORAL DAILY
Qty: 90 TABLET | Refills: 1 | Status: SHIPPED | OUTPATIENT
Start: 2023-08-04 | End: 2024-02-05

## 2023-08-07 ENCOUNTER — ANCILLARY PROCEDURE (OUTPATIENT)
Dept: MAMMOGRAPHY | Facility: CLINIC | Age: 48
End: 2023-08-07
Attending: FAMILY MEDICINE
Payer: COMMERCIAL

## 2023-08-07 DIAGNOSIS — Z12.31 VISIT FOR SCREENING MAMMOGRAM: ICD-10-CM

## 2023-08-07 PROCEDURE — 77063 BREAST TOMOSYNTHESIS BI: CPT | Mod: TC | Performed by: RADIOLOGY

## 2023-08-07 PROCEDURE — 77067 SCR MAMMO BI INCL CAD: CPT | Mod: TC | Performed by: RADIOLOGY

## 2023-08-30 ENCOUNTER — LAB (OUTPATIENT)
Dept: LAB | Facility: CLINIC | Age: 48
End: 2023-08-30
Payer: COMMERCIAL

## 2023-08-30 DIAGNOSIS — I10 HYPERTENSION GOAL BP (BLOOD PRESSURE) < 140/90: ICD-10-CM

## 2023-08-30 DIAGNOSIS — E28.2 PCOS (POLYCYSTIC OVARIAN SYNDROME): ICD-10-CM

## 2023-08-30 LAB
ANION GAP SERPL CALCULATED.3IONS-SCNC: 10 MMOL/L (ref 7–15)
BUN SERPL-MCNC: 7.2 MG/DL (ref 6–20)
CALCIUM SERPL-MCNC: 9.2 MG/DL (ref 8.6–10)
CHLORIDE SERPL-SCNC: 103 MMOL/L (ref 98–107)
CREAT SERPL-MCNC: 0.69 MG/DL (ref 0.51–0.95)
DEPRECATED HCO3 PLAS-SCNC: 25 MMOL/L (ref 22–29)
GFR SERPL CREATININE-BSD FRML MDRD: >90 ML/MIN/1.73M2
GLUCOSE SERPL-MCNC: 93 MG/DL (ref 70–99)
POTASSIUM SERPL-SCNC: 4.3 MMOL/L (ref 3.4–5.3)
SODIUM SERPL-SCNC: 138 MMOL/L (ref 136–145)
VIT B12 SERPL-MCNC: 343 PG/ML (ref 232–1245)

## 2023-08-30 PROCEDURE — 80048 BASIC METABOLIC PNL TOTAL CA: CPT

## 2023-08-30 PROCEDURE — 36415 COLL VENOUS BLD VENIPUNCTURE: CPT

## 2023-08-30 PROCEDURE — 82607 VITAMIN B-12: CPT

## 2023-11-02 ENCOUNTER — TELEPHONE (OUTPATIENT)
Dept: GASTROENTEROLOGY | Facility: CLINIC | Age: 48
End: 2023-11-02
Payer: COMMERCIAL

## 2023-11-02 NOTE — TELEPHONE ENCOUNTER
"Endoscopy Scheduling Screen    Have you had a positive Covid test in the last 14 days?  No    Are you active on MyChart?   Yes    What insurance is in the chart?  Other:  [employer]    Ordering/Referring Provider: BENNIE DUDLEY    (If ordering provider performs procedure, schedule with ordering provider unless otherwise instructed. )    BMI: Estimated body mass index is 30.05 kg/m  as calculated from the following:    Height as of 2/8/23: 1.638 m (5' 4.5\").    Weight as of 2/8/23: 80.6 kg (177 lb 12.8 oz).     Sedation Ordered  moderate sedation.   If patient BMI > 50 do not schedule in [facility].    If patient BMI > 45 do not schedule at [facility].    Are you taking methadone or Suboxone?  No    Are you taking any prescription medications for pain 3 or more times per week?   No    Do you have a history of malignant hyperthermia or adverse reaction to anesthesia?  No    (Females) Are you currently pregnant?   No     Have you been diagnosed or told you have pulmonary hypertension?   No    Do you have an LVAD?  No    Have you been told you have moderate to severe sleep apnea?  No    Have you been told you have COPD, asthma, or any other lung disease?  No    Do you have any heart conditions?  No     Have you ever had an organ transplant?   No    Have you ever had or are you awaiting a heart or lung transplant?   No    Have you had a stroke or transient ischemic attack (TIA aka \"mini stroke\" in the last 6 months?   No    Have you been diagnosed with or been told you have cirrhosis of the liver?   No    Are you currently on dialysis?   No    Do you need assistance transferring?   No    BMI: Estimated body mass index is 30.05 kg/m  as calculated from the following:    Height as of 2/8/23: 1.638 m (5' 4.5\").    Weight as of 2/8/23: 80.6 kg (177 lb 12.8 oz).     Is patients BMI > 40 and scheduling location [facility]?  No    Do you take an injectable medication for weight loss or diabetes (excluding insulin)?  No    Do you " take the medication Naltrexone?  No    Do you take blood thinners?  No       Prep   Are you currently on dialysis or do you have chronic kidney disease?  No    Do you have a diagnosis of diabetes?  No    Do you have a diagnosis of cystic fibrosis (CF)?  No    On a regular basis do you go 3 -5 days between bowel movements?  No    BMI > 40?  No    Preferred Pharmacy:    [id number] [place], [place] - [address] [address]  Phone: [phone] Fax: [phone]        Final Scheduling Details   Colonoscopy prep sent?  Standard MiraLAX    Procedure scheduled  Colonoscopy    Surgeon:  Klaus     Date of procedure:  1/17/24     Pre-OP / PAC:   No - Not required for this site.    Location  [facility] - Per order.    Sedation   Moderate Sedation - Per order.      Patient Reminders:   You will receive a call from a Nurse to review instructions and health history.  This assessment must be completed prior to your procedure.  Failure to complete the Nurse assessment may result in the procedure being cancelled.      On the day of your procedure, please designate an adult(s) who can drive you home stay with you for the next 24 hours. The medicines used in the exam will make you sleepy. You will not be able to drive.      You cannot take public transportation, ride share services, or non-medical taxi service without a responsible caregiver.  Medical transport services are allowed with the requirement that a responsible caregiver will receive you at your destination.  We require that drivers and caregivers are confirmed prior to your procedure.

## 2024-01-05 ENCOUNTER — TELEPHONE (OUTPATIENT)
Dept: GASTROENTEROLOGY | Facility: CLINIC | Age: 49
End: 2024-01-05
Payer: COMMERCIAL

## 2024-01-05 NOTE — TELEPHONE ENCOUNTER
Pre assessment completed for upcoming procedure.   (Please see previous telephone encounter notes for complete details)    Patient  returned call.       Procedure details:    Arrival time and facility location reviewed.    Pre op exam needed? N/A    Designated  policy reviewed. Instructed to have someone stay 6 hours post procedure.     COVID policy reviewed.      Medication review:    Medications reviewed. Please see supporting documentation below. Holding recommendations discussed (if applicable).       Prep for procedure:     Procedure prep instructions reviewed.        Additional information needed?  N/A      Patient  verbalized understanding and had no questions or concerns at this time.      Yajaira Ramires RN  Endoscopy Procedure Pre Assessment RN  [phone] option 4

## 2024-01-05 NOTE — TELEPHONE ENCOUNTER
Called the Pt to complete Pre-Assessment. First Attempt. No answer, Left message.     Krissy Contreras RN   Endoscopy Procedure Pre Assessment RN

## 2024-01-05 NOTE — TELEPHONE ENCOUNTER
Pre visit planning completed.      Procedure details:    Patient scheduled for Colonoscopy  on 1/17/24.     Arrival time: 0815. Procedure time 0900    Pre op exam needed? N/A    Facility location: [facility] [place]; [address], [place] [address]    Sedation type: Conscious sedation     Indication for procedure: Screening      Chart review:     Electronic implanted devices? No    Recent diagnosis of diverticulitis within the last 6 weeks? No    Diabetic? No -- On metformin for PCOS.      Medication review:    Anticoagulants? No    NSAIDS? No NSAID medications per patient's medication list.  RN will verify with pre-assessment call.    Other medication HOLDING recommendations:  N/A      Prep for procedure:     Bowel prep recommendation: Standard Miralax   Due to:  standard bowel prep.    Prep instructions sent via [place]       Krissy Contreras RN  Endoscopy Procedure Pre Assessment RN  [phone] option 4

## 2024-01-16 RX ORDER — PROCHLORPERAZINE MALEATE 10 MG
10 TABLET ORAL EVERY 6 HOURS PRN
Status: CANCELLED | OUTPATIENT
Start: 2024-01-16

## 2024-01-16 RX ORDER — NALOXONE HYDROCHLORIDE 0.4 MG/ML
0.4 INJECTION, SOLUTION INTRAMUSCULAR; INTRAVENOUS; SUBCUTANEOUS
Status: CANCELLED | OUTPATIENT
Start: 2024-01-16

## 2024-01-16 RX ORDER — ONDANSETRON 2 MG/ML
4 INJECTION INTRAMUSCULAR; INTRAVENOUS EVERY 6 HOURS PRN
Status: CANCELLED | OUTPATIENT
Start: 2024-01-16

## 2024-01-16 RX ORDER — ONDANSETRON 4 MG/1
4 TABLET, ORALLY DISINTEGRATING ORAL EVERY 6 HOURS PRN
Status: CANCELLED | OUTPATIENT
Start: 2024-01-16

## 2024-01-16 RX ORDER — NALOXONE HYDROCHLORIDE 0.4 MG/ML
0.2 INJECTION, SOLUTION INTRAMUSCULAR; INTRAVENOUS; SUBCUTANEOUS
Status: CANCELLED | OUTPATIENT
Start: 2024-01-16

## 2024-01-16 RX ORDER — FLUMAZENIL 0.1 MG/ML
0.2 INJECTION, SOLUTION INTRAVENOUS
Status: CANCELLED | OUTPATIENT
Start: 2024-01-16 | End: 2024-01-17

## 2024-01-17 ENCOUNTER — HOSPITAL ENCOUNTER (OUTPATIENT)
Facility: AMBULATORY SURGERY CENTER | Age: 49
Discharge: HOME | End: 2024-01-17
Attending: STUDENT IN AN ORGANIZED HEALTH CARE EDUCATION/TRAINING PROGRAM | Admitting: STUDENT IN AN ORGANIZED HEALTH CARE EDUCATION/TRAINING PROGRAM
Payer: COMMERCIAL

## 2024-01-17 VITALS
TEMPERATURE: 98.2 F | OXYGEN SATURATION: 98 % | HEART RATE: 106 BPM | SYSTOLIC BLOOD PRESSURE: 133 MMHG | DIASTOLIC BLOOD PRESSURE: 95 MMHG | RESPIRATION RATE: 16 BRPM

## 2024-01-17 DIAGNOSIS — Z12.11 COLON CANCER SCREENING: Primary | ICD-10-CM

## 2024-01-17 LAB — COLONOSCOPY: NORMAL

## 2024-01-17 PROCEDURE — G8918 PT W/O PREOP ORDER IV AB PRO: HCPCS

## 2024-01-17 PROCEDURE — G8907 PT DOC NO EVENTS ON DISCHARG: HCPCS

## 2024-01-17 PROCEDURE — 88305 TISSUE EXAM BY PATHOLOGIST: CPT | Performed by: PATHOLOGY

## 2024-01-17 PROCEDURE — 45385 COLONOSCOPY W/LESION REMOVAL: CPT

## 2024-01-17 RX ORDER — LIDOCAINE 40 MG/G
CREAM TOPICAL
Status: DISCONTINUED | OUTPATIENT
Start: 2024-01-17 | End: 2024-01-18 | Stop reason: HOSPADM

## 2024-01-17 RX ORDER — FENTANYL CITRATE 50 UG/ML
INJECTION, SOLUTION INTRAMUSCULAR; INTRAVENOUS PRN
Status: DISCONTINUED | OUTPATIENT
Start: 2024-01-17 | End: 2024-01-17 | Stop reason: HOSPADM

## 2024-01-17 RX ORDER — ONDANSETRON 2 MG/ML
4 INJECTION INTRAMUSCULAR; INTRAVENOUS
Status: COMPLETED | OUTPATIENT
Start: 2024-01-17 | End: 2024-01-17

## 2024-01-17 RX ADMIN — ONDANSETRON 4 MG: 2 INJECTION INTRAMUSCULAR; INTRAVENOUS at 08:38

## 2024-01-17 NOTE — H&P
Pre-Endoscopy History and Physical     Ivania Gomez MRN# [id number]   YOB: 1975 Age: 48 year old     Date of Procedure: 1/17/2024  Primary care provider: Jacquelyn Zaldivar  Type of Endoscopy: colonoscopy  Reason for Procedure: screening  Type of Anesthesia Anticipated: Moderate Sedation    HPI:    Ivania is a 48 year old female who will be undergoing the above procedure.      A history and physical has been performed. The patient's medications and allergies have been reviewed. The risks and benefits of the procedure and the sedation options and risks were discussed with the patient.  I specifically discussed about possible sedation medication reaction, bleeding, and one of the more rare complications of perforation.  All questions were answered and informed consent was obtained.      She denies a personal or family history of anesthesia complications or bleeding disorders.     Allergies   Allergen Reactions    Ibuprofen Hives and Swelling    Naproxen Hives and Swelling        Cannot display prior to admission medications because the patient has not been admitted in this contact.       Patient Active Problem List   Diagnosis    CARDIOVASCULAR SCREENING; LDL GOAL LESS THAN 160    Mild major depression (H)    PCOS (polycystic ovarian syndrome)    S/P laparoscopic cholecystectomy    Acne vulgaris    History of dysplastic nevus    Melanocytic nevus    History of gestational diabetes    Chronic rhinitis    Hypertriglyceridemia    Submucous leiomyoma of uterus    Excessive or frequent menstruation    Anxiety    Hypertension goal BP (blood pressure) < 140/90    Major depressive disorder, recurrent episode, mild (H24)    Seasonal allergic rhinitis, unspecified trigger        Past Medical History:   Diagnosis Date    Acne vulgaris     Allergies     Anxiety     ASCUS on Pap smear 1994    colp benign    Depressive disorder     post partum and anxiety issues    Diabetes mellitus (H)     History of dysplastic  "nevus     Hoffa's fat pad disease (H)     Hypertension goal BP (blood pressure) < 140/90 2/8/2023    Melanocytic nevus 6/14/2012    Obesity, unspecified     PCOS (polycystic ovarian syndrome)         Past Surgical History:   Procedure Laterality Date    BIOPSY      moles removed    CHOLECYSTECTOMY  2011    COLPOSCOPY CERVIX, BIOPSY CERVIX, ENDOCERVICAL CURETTAGE, COMBINED  1994    benign    EYE SURGERY  2009    lasix    EYE SURGERY      Lasik    HYSTERECTOMY, PAP NO LONGER INDICATED  05/25/2018    hysterectomy with bilateral salpingectomy, ovaries remain       Social History     Tobacco Use    Smoking status: Never    Smokeless tobacco: Never    Tobacco comments:     nonsmoking household   Substance Use Topics    Alcohol use: Yes     Comment: occasional       Family History   Problem Relation Age of Onset    Cancer Mother         vulvar sarcoma    Hypertension Mother     Other Cancer Mother         vulvar sarcoma    Thyroid Disease Mother     Colon Polyps Father     Thyroid Disease Sister     Anxiety Disorder Sister     Thyroid Disease Sister     Hyperlipidemia Brother     Hypertension Maternal Grandmother     Cancer Maternal Grandfather         lung    Prostate Cancer Paternal Grandfather        REVIEW OF SYSTEMS:     5 point ROS negative except as noted above in HPI, including Gen., Resp., CV, GI &  system review.      PHYSICAL EXAM:   BP (!) 137/90   Pulse 106   Temp 98.2  F (36.8  C) (Temporal)   Resp 16   LMP 04/29/2018 (Exact Date)   SpO2 97%  Estimated body mass index is 30.05 kg/m  as calculated from the following:    Height as of 2/8/23: 1.638 m (5' 4.5\").    Weight as of 2/8/23: 80.6 kg (177 lb 12.8 oz).   GENERAL APPEARANCE: healthy, alert, and no distress  MENTAL STATUS: alert  AIRWAY EXAM: Mallampatti Class II (visualization of the soft palate, fauces, and uvula)  RESP: lungs clear to auscultation - no rales, rhonchi or wheezes  CV: regular rates and rhythm, normal S1 S2, no S3 or S4, no murmur, " click or rub, and no irregular beats      DIAGNOSTICS:    Not indicated      IMPRESSION   ASA Class 2 - Mild systemic disease        PLAN:       Plan for colonoscopy. We discussed the risks, benefits and alternatives and the patient wished to proceed.    The above has been forwarded to the consulting provider.      Signed Electronically by: PAT CANDELARIO MD  January 17, 2024

## 2024-01-19 LAB
PATH REPORT.COMMENTS IMP SPEC: NORMAL
PATH REPORT.COMMENTS IMP SPEC: NORMAL
PATH REPORT.FINAL DX SPEC: NORMAL
PATH REPORT.GROSS SPEC: NORMAL
PATH REPORT.MICROSCOPIC SPEC OTHER STN: NORMAL
PATH REPORT.RELEVANT HX SPEC: NORMAL
PHOTO IMAGE: NORMAL

## 2024-01-22 NOTE — RESULT ENCOUNTER NOTE
Ivania,    The type of polyp that was removed during your colonoscopy is a tubular adenoma. This is not a cancer.  This is the type of polyp that sometimes will turn into one.      This polyp is out and will not cause you any further problems.      You should have another colonoscopy in 7 years to evaluate for other polyps.      Please let me know if you have any questions.       Thank you,    Bal Omer MD

## 2024-02-05 DIAGNOSIS — E28.2 PCOS (POLYCYSTIC OVARIAN SYNDROME): ICD-10-CM

## 2024-02-05 DIAGNOSIS — I10 HYPERTENSION GOAL BP (BLOOD PRESSURE) < 140/90: ICD-10-CM

## 2024-02-05 DIAGNOSIS — Z86.32 HISTORY OF GESTATIONAL DIABETES: Primary | ICD-10-CM

## 2024-02-05 DIAGNOSIS — F33.0 MAJOR DEPRESSIVE DISORDER, RECURRENT EPISODE, MILD (H): ICD-10-CM

## 2024-02-05 RX ORDER — LOSARTAN POTASSIUM 50 MG/1
50 TABLET ORAL DAILY
Qty: 90 TABLET | Refills: 0 | Status: SHIPPED | OUTPATIENT
Start: 2024-02-05 | End: 2024-03-21

## 2024-02-05 RX ORDER — CITALOPRAM HYDROBROMIDE 20 MG/1
20 TABLET ORAL DAILY
Qty: 90 TABLET | Refills: 0 | Status: SHIPPED | OUTPATIENT
Start: 2024-02-05 | End: 2024-03-21

## 2024-02-27 DIAGNOSIS — J30.2 SEASONAL ALLERGIC RHINITIS, UNSPECIFIED TRIGGER: ICD-10-CM

## 2024-02-27 RX ORDER — CETIRIZINE HYDROCHLORIDE, PSEUDOEPHEDRINE HYDROCHLORIDE 5; 120 MG/1; MG/1
TABLET, FILM COATED, EXTENDED RELEASE ORAL
Qty: 180 TABLET | Refills: 3 | Status: SHIPPED | OUTPATIENT
Start: 2024-02-27

## 2024-03-18 SDOH — HEALTH STABILITY: PHYSICAL HEALTH: ON AVERAGE, HOW MANY DAYS PER WEEK DO YOU ENGAGE IN MODERATE TO STRENUOUS EXERCISE (LIKE A BRISK WALK)?: 1 DAY

## 2024-03-18 SDOH — HEALTH STABILITY: PHYSICAL HEALTH: ON AVERAGE, HOW MANY MINUTES DO YOU ENGAGE IN EXERCISE AT THIS LEVEL?: 40 MIN

## 2024-03-18 ASSESSMENT — SOCIAL DETERMINANTS OF HEALTH (SDOH): HOW OFTEN DO YOU GET TOGETHER WITH FRIENDS OR RELATIVES?: TWICE A WEEK

## 2024-03-20 ENCOUNTER — LAB (OUTPATIENT)
Dept: LAB | Facility: CLINIC | Age: 49
End: 2024-03-20
Payer: COMMERCIAL

## 2024-03-20 DIAGNOSIS — E28.2 PCOS (POLYCYSTIC OVARIAN SYNDROME): ICD-10-CM

## 2024-03-20 DIAGNOSIS — I10 HYPERTENSION GOAL BP (BLOOD PRESSURE) < 140/90: ICD-10-CM

## 2024-03-20 DIAGNOSIS — Z86.32 HISTORY OF GESTATIONAL DIABETES: ICD-10-CM

## 2024-03-20 LAB
ANION GAP SERPL CALCULATED.3IONS-SCNC: 11 MMOL/L (ref 7–15)
BUN SERPL-MCNC: 7.9 MG/DL (ref 6–20)
CALCIUM SERPL-MCNC: 9.3 MG/DL (ref 8.6–10)
CHLORIDE SERPL-SCNC: 103 MMOL/L (ref 98–107)
CHOLEST SERPL-MCNC: 192 MG/DL
CREAT SERPL-MCNC: 0.71 MG/DL (ref 0.51–0.95)
DEPRECATED HCO3 PLAS-SCNC: 25 MMOL/L (ref 22–29)
EGFRCR SERPLBLD CKD-EPI 2021: >90 ML/MIN/1.73M2
FASTING STATUS PATIENT QL REPORTED: NO
GLUCOSE SERPL-MCNC: 130 MG/DL (ref 70–99)
HBA1C MFR BLD: 5.5 % (ref 0–5.6)
HDLC SERPL-MCNC: 39 MG/DL
LDLC SERPL CALC-MCNC: 94 MG/DL
NONHDLC SERPL-MCNC: 153 MG/DL
POTASSIUM SERPL-SCNC: 4.2 MMOL/L (ref 3.4–5.3)
SODIUM SERPL-SCNC: 139 MMOL/L (ref 135–145)
TRIGL SERPL-MCNC: 296 MG/DL

## 2024-03-20 PROCEDURE — 83036 HEMOGLOBIN GLYCOSYLATED A1C: CPT

## 2024-03-20 PROCEDURE — 80061 LIPID PANEL: CPT

## 2024-03-20 PROCEDURE — 36415 COLL VENOUS BLD VENIPUNCTURE: CPT

## 2024-03-20 PROCEDURE — 80048 BASIC METABOLIC PNL TOTAL CA: CPT

## 2024-03-21 ENCOUNTER — OFFICE VISIT (OUTPATIENT)
Dept: FAMILY MEDICINE | Facility: CLINIC | Age: 49
End: 2024-03-21
Payer: COMMERCIAL

## 2024-03-21 VITALS
TEMPERATURE: 97.8 F | HEIGHT: 65 IN | BODY MASS INDEX: 30.82 KG/M2 | SYSTOLIC BLOOD PRESSURE: 132 MMHG | DIASTOLIC BLOOD PRESSURE: 84 MMHG | OXYGEN SATURATION: 99 % | HEART RATE: 104 BPM | WEIGHT: 185 LBS | RESPIRATION RATE: 16 BRPM

## 2024-03-21 DIAGNOSIS — J30.2 SEASONAL ALLERGIC RHINITIS, UNSPECIFIED TRIGGER: ICD-10-CM

## 2024-03-21 DIAGNOSIS — F33.0 MAJOR DEPRESSIVE DISORDER, RECURRENT EPISODE, MILD (H): ICD-10-CM

## 2024-03-21 DIAGNOSIS — Z00.00 ROUTINE GENERAL MEDICAL EXAMINATION AT A HEALTH CARE FACILITY: Primary | ICD-10-CM

## 2024-03-21 DIAGNOSIS — I10 HYPERTENSION GOAL BP (BLOOD PRESSURE) < 140/90: ICD-10-CM

## 2024-03-21 DIAGNOSIS — E28.2 PCOS (POLYCYSTIC OVARIAN SYNDROME): ICD-10-CM

## 2024-03-21 PROCEDURE — 99214 OFFICE O/P EST MOD 30 MIN: CPT | Mod: 25 | Performed by: FAMILY MEDICINE

## 2024-03-21 PROCEDURE — 99396 PREV VISIT EST AGE 40-64: CPT | Performed by: FAMILY MEDICINE

## 2024-03-21 RX ORDER — LOSARTAN POTASSIUM 50 MG/1
50 TABLET ORAL DAILY
Qty: 90 TABLET | Refills: 1 | Status: SHIPPED | OUTPATIENT
Start: 2024-03-21

## 2024-03-21 RX ORDER — FLUTICASONE PROPIONATE 50 MCG
SPRAY, SUSPENSION (ML) NASAL
Qty: 48 ML | Refills: 3 | Status: SHIPPED | OUTPATIENT
Start: 2024-03-21

## 2024-03-21 RX ORDER — CITALOPRAM HYDROBROMIDE 20 MG/1
20 TABLET ORAL DAILY
Qty: 90 TABLET | Refills: 1 | Status: SHIPPED | OUTPATIENT
Start: 2024-03-21

## 2024-03-21 ASSESSMENT — PAIN SCALES - GENERAL: PAINLEVEL: NO PAIN (0)

## 2024-03-21 ASSESSMENT — PATIENT HEALTH QUESTIONNAIRE - PHQ9
SUM OF ALL RESPONSES TO PHQ QUESTIONS 1-9: 1
10. IF YOU CHECKED OFF ANY PROBLEMS, HOW DIFFICULT HAVE THESE PROBLEMS MADE IT FOR YOU TO DO YOUR WORK, TAKE CARE OF THINGS AT HOME, OR GET ALONG WITH OTHER PEOPLE: NOT DIFFICULT AT ALL
SUM OF ALL RESPONSES TO PHQ QUESTIONS 1-9: 1

## 2024-03-21 NOTE — PATIENT INSTRUCTIONS
Preventive Care Advice   This is general advice given by our system to help you stay healthy. However, your care team may have specific advice just for you. Please talk to your care team about your preventive care needs.  Nutrition  Eat 5 or more servings of fruits and vegetables each day.  Try wheat bread, brown rice and whole grain pasta (instead of white bread, rice, and pasta).  Get enough calcium and vitamin D. Check the label on foods and aim for 100% of the RDA (recommended daily allowance).  Lifestyle  Exercise at least 150 minutes each week   (30 minutes a day, 5 days a week).  Do muscle strengthening activities 2 days a week. These help control your weight and prevent disease.  No smoking.  Wear sunscreen to prevent skin cancer.  Have a dental exam and cleaning every 6 months.  Yearly exams  See your health care team every year to talk about:  Any changes in your health.  Any medicines your care team has prescribed.  Preventive care, family planning, and ways to prevent chronic diseases.  Shots (vaccines)   HPV shots (up to age 26), if you've never had them before.  Hepatitis B shots (up to age 59), if you've never had them before.  COVID-19 shot: Get this shot when it's due.  Flu shot: Get a flu shot every year.  Tetanus shot: Get a tetanus shot every 10 years.  Pneumococcal, hepatitis A, and RSV shots: Ask your care team if you need these based on your risk.  Shingles shot (for age 50 and up).  General health tests  Diabetes screening:  Starting at age 35, Get screened for diabetes at least every 3 years.  If you are younger than age 35, ask your care team if you should be screened for diabetes.  Cholesterol test: At age 39, start having a cholesterol test every 5 years, or more often if advised.  Bone density scan (DEXA): At age 50, ask your care team if you should have this scan for osteoporosis (brittle bones).  Hepatitis C: Get tested at least once in your life.  STIs (sexually transmitted  infections)  Before age 24: Ask your care team if you should be screened for STIs.  After age 24: Get screened for STIs if you're at risk. You are at risk for STIs (including HIV) if:  You are sexually active with more than one person.  You don't use condoms every time.  You or a partner was diagnosed with a sexually transmitted infection.  If you are at risk for HIV, ask about PrEP medicine to prevent HIV.  Get tested for HIV at least once in your life, whether you are at risk for HIV or not.  Cancer screening tests  Cervical cancer screening: If you have a cervix, begin getting regular cervical cancer screening tests at age 21. Most people who have regular screenings with normal results can stop after age 65. Talk about this with your provider.  Breast cancer scan (mammogram): If you've ever had breasts, begin having regular mammograms starting at age 40. This is a scan to check for breast cancer.  Colon cancer screening: It is important to start screening for colon cancer at age 45.  Have a colonoscopy test every 10 years (or more often if you're at risk) Or, ask your provider about stool tests like a FIT test every year or Cologuard test every 3 years.  To learn more about your testing options, visit: https://www.Digidentity/867523.pdf.  For help making a decision, visit: https://bit.ly/xk15765.  Prostate cancer screening test: If you have a prostate and are age 55 to 69, ask your provider if you would benefit from a yearly prostate cancer screening test.  Lung cancer screening: If you are a current or former smoker age 50 to 80, ask your care team if ongoing lung cancer screenings are right for you.  For informational purposes only. Not to replace the advice of your health care provider. Copyright   2023 [employer]. All rights reserved. Clinically reviewed by the [facility]. [employer] 905271 - REV 01/24.    Learning About Stress  What is stress?     Stress is your  body's response to a hard situation. Your body can have a physical, emotional, or mental response. Stress is a fact of life for most people, and it affects everyone differently. What causes stress for you may not be stressful for someone else.  A lot of things can cause stress. You may feel stress when you go on a job interview, take a test, or run a race. This kind of short-term stress is normal and even useful. It can help you if you need to work hard or react quickly. For example, stress can help you finish an important job on time.  Long-term stress is caused by ongoing stressful situations or events. Examples of long-term stress include long-term health problems, ongoing problems at work, or conflicts in your family. Long-term stress can harm your health.  How does stress affect your health?  When you are stressed, your body responds as though you are in danger. It makes hormones that speed up your heart, make you breathe faster, and give you a burst of energy. This is called the fight-or-flight stress response. If the stress is over quickly, your body goes back to normal and no harm is done.  But if stress happens too often or lasts too long, it can have bad effects. Long-term stress can make you more likely to get sick, and it can make symptoms of some diseases worse. If you tense up when you are stressed, you may develop neck, shoulder, or low back pain. Stress is linked to high blood pressure and heart disease.  Stress also harms your emotional health. It can make you shirley, tense, or depressed. Your relationships may suffer, and you may not do well at work or school.  What can you do to manage stress?  You can try these things to help manage stress:   Do something active. Exercise or activity can help reduce stress. Walking is a great way to get started. Even everyday activities such as housecleaning or yard work can help.  Try yoga or elpidio chi. These techniques combine exercise and meditation. You may need  some training at first to learn them.  Do something you enjoy. For example, listen to music or go to a movie. Practice your hobby or do volunteer work.  Meditate. This can help you relax, because you are not worrying about what happened before or what may happen in the future.  Do guided imagery. Imagine yourself in any setting that helps you feel calm. You can use online videos, books, or a teacher to guide you.  Do breathing exercises. For example:  From a standing position, bend forward from the waist with your knees slightly bent. Let your arms dangle close to the floor.  Breathe in slowly and deeply as you return to a standing position. Roll up slowly and lift your head last.  Hold your breath for just a few seconds in the standing position.  Breathe out slowly and bend forward from the waist.  Let your feelings out. Talk, laugh, cry, and express anger when you need to. Talking with supportive friends or family, a counselor, or a john leader about your feelings is a healthy way to relieve stress. Avoid discussing your feelings with people who make you feel worse.  Write. It may help to write about things that are bothering you. This helps you find out how much stress you feel and what is causing it. When you know this, you can find better ways to cope.  What can you do to prevent stress?  You might try some of these things to help prevent stress:  Manage your time. This helps you find time to do the things you want and need to do.  Get enough sleep. Your body recovers from the stresses of the day while you are sleeping.  Get support. Your family, friends, and community can make a difference in how you experience stress.  Limit your news feed. Avoid or limit time on social media or news that may make you feel stressed.  Do something active. Exercise or activity can help reduce stress. Walking is a great way to get started.  Where can you learn more?  Go to https://www.healthwise.net/patiented  Enter N032 in the  "search box to learn more about \"Learning About Stress.\"  Current as of: October 24, 2023               Content Version: 14.0    6261-3489 [employer].   Care instructions adapted under license by your healthcare professional. If you have questions about a medical condition or this instruction, always ask your healthcare professional. [employer] disclaims any warranty or liability for your use of this information.      "

## 2024-03-21 NOTE — PROGRESS NOTES
"Preventive Care Visit  [facility]  Jacquelyn Zaldivar MD, Family Medicine  Mar 21, 2024      Assessment & Plan     (Z00.00) Routine general medical examination at a health care facility  (primary encounter diagnosis)  Comment: preventive needs reviewed   Plan: see orders in Epic.     (F33.0) Major depressive disorder, recurrent episode, mild (H24)  Comment: well controlled  Plan: citalopram (CELEXA) 20 MG tablet         Refill x 6 months    (I10) Hypertension goal BP (blood pressure) < 140/90  Comment: controlled  Plan: losartan (COZAAR) 50 MG tablet         Refill x 6 months f/u 6 months for labs     (E28.2) PCOS (polycystic ovarian syndrome)  Comment: controlled  Plan: metFORMIN (GLUCOPHAGE) 500 MG tablet         Refill x 6 months     (J30.2) Seasonal allergic rhinitis, unspecified trigger  Comment: controlled  Plan: fluticasone (FLONASE) 50 MCG/ACT nasal spray        Refill x 1 yr               BMI  Estimated body mass index is 31.02 kg/m  as calculated from the following:    Height as of this encounter: 1.645 m (5' 4.75\").    Weight as of this encounter: 83.9 kg (185 lb).   Weight management plan: Discussed healthy diet and exercise guidelines    Counseling  Appropriate preventive services were discussed with this patient, including applicable screening as appropriate for fall prevention, nutrition, physical activity, Tobacco-use cessation, weight loss and cognition.  Checklist reviewing preventive services available has been given to the patient.  Reviewed patient's diet, addressing concerns and/or questions.   She is at risk for lack of exercise and has been provided with information to increase physical activity for the benefit of her well-being.   She is at risk for psychosocial distress and has been provided with information to reduce risk.       See Patient Instructions    Kaia Redd is a 48 year old, presenting for the following:  Physical        3/21/2024     1:03 PM "   Additional Questions   Roomed by Abdifatah JACKSON MA        Health Care Directive  Patient does not have a Health Care Directive or Living Will: Discussed advance care planning with patient; information given to patient to review.    HPI  No concerns            3/18/2024   General Health   How would you rate your overall physical health? Good   Feel stress (tense, anxious, or unable to sleep) Only a little   (!) STRESS CONCERN      3/18/2024   Nutrition   Three or more servings of calcium each day? Yes   Diet: Regular (no restrictions)   How many servings of fruit and vegetables per day? (!) 2-3   How many sweetened beverages each day? 0-1         3/18/2024   Exercise   Days per week of moderate/strenous exercise 1 day   Average minutes spent exercising at this level 40 min   (!) EXERCISE CONCERN      3/18/2024   Social Factors   Frequency of gathering with friends or relatives Twice a week   Worry food won't last until get money to buy more No   Food not last or not have enough money for food? No   Do you have housing?  Yes   Are you worried about losing your housing? No   Lack of transportation? No   Unable to get utilities (heat,electricity)? No         3/18/2024   Dental   Dentist two times every year? Yes         3/18/2024   TB Screening   Were you born outside of the [place]? No       Today's PHQ-9 Score:       3/21/2024     1:01 PM   PHQ-9 SCORE   PHQ-9 Total Score MyChart 1 (Minimal depression)   PHQ-9 Total Score 1         3/18/2024   Substance Use   Alcohol more than 3/day or more than 7/wk No   Do you use any other substances recreationally? No     Social History     Tobacco Use    Smoking status: Never    Smokeless tobacco: Never    Tobacco comments:     nonsmoking household   Vaping Use    Vaping Use: Never used   Substance Use Topics    Alcohol use: Yes     Comment: occasional    Drug use: No             3/18/2024   Breast Cancer Screening   Family history of breast, colon, or ovarian cancer? No / Unknown          2023   LAST FHS-7 RESULTS   1st degree relative breast or ovarian cancer No   Any relative bilateral breast cancer No   Any male have breast cancer No   Any ONE woman have BOTH breast AND ovarian cancer No   Any woman with breast cancer before 50yrs No   2 or more relatives with breast AND/OR ovarian cancer No   2 or more relatives with breast AND/OR bowel cancer No        Mammogram Screening - Mammogram every 1-2 years updated in Health Maintenance based on mutual decision making    G 2 P 2   Patient's last menstrual period was 2018 (exact date).     Fasting: No   Td:tdap 2022       Flu: 2023      Covid: 2023      Shingrix: NA      PPV: NA      Last 3 Pap and HPV Results:       Latest Ref Rng & Units 2017    12:17 PM 2017    12:12 PM 2014    12:00 AM   PAP / HPV   PAP (Historical)   NIL  NIL    HPV 16 DNA NEG Negative      HPV 18 DNA NEG Negative      Other HR HPV NEG Negative        Status post benign hysterectomy. Health Maintenance and Surgical History updated.               Cholesterol:   Lab Results   Component Value Date    CHOL 192 2024    CHOL 191 2020     Lab Results   Component Value Date    HDL 39 2024    HDL 40 2020     Lab Results   Component Value Date    LDL 94 2024     2020     Lab Results   Component Value Date    TRIG 296 2024    TRIG 174 2020     Lab Results   Component Value Date    CHOLHDLRATIO 5.2 2015         MM2023  Dexa:  NA     Flex/colo: 2024 q7y scope      Seat Belt: Yes    Sunscreen use: Yes   Calcium Intake: adeq  Health Care Directive: Yes   Sexually Active: Yes     Current contraception: hysterectomy  History of abnormal Pap smear: No  Family history of colon/breast/ovarian cancer: No  Regular self breast exam: Yes  History of abnormal mammogram: No          3/18/2024   STI Screening   New sexual partner(s) since last STI/HIV test? No         ASCVD Risk   The 10-year ASCVD  risk score (Michelle FOREMAN, et al., 2019) is: 2.3%    Values used to calculate the score:      Age: 48 years      Sex: Female      Is Non- : No      Diabetic: No      Tobacco smoker: No      Systolic Blood Pressure: 132 mmHg      Is BP treated: Yes      HDL Cholesterol: 39 mg/dL      Total Cholesterol: 192 mg/dL       Reviewed and updated as needed this visit by Provider   Tobacco  Allergies  Meds  Problems  Med Hx  Surg Hx  Fam Hx            Labs reviewed in EPIC  BP Readings from Last 3 Encounters:   03/21/24 132/84   01/17/24 (!) 133/95   02/08/23 139/88    Wt Readings from Last 3 Encounters:   03/21/24 83.9 kg (185 lb)   02/08/23 80.6 kg (177 lb 12.8 oz)   02/07/22 83.5 kg (184 lb)                  Patient Active Problem List   Diagnosis    CARDIOVASCULAR SCREENING; LDL GOAL LESS THAN 160    Mild major depression (H)    PCOS (polycystic ovarian syndrome)    S/P laparoscopic cholecystectomy    Acne vulgaris    History of dysplastic nevus    Melanocytic nevus    History of gestational diabetes    Chronic rhinitis    Hypertriglyceridemia    Submucous leiomyoma of uterus    Excessive or frequent menstruation    Anxiety    Hypertension goal BP (blood pressure) < 140/90    Major depressive disorder, recurrent episode, mild (H24)    Seasonal allergic rhinitis, unspecified trigger     Past Surgical History:   Procedure Laterality Date    BIOPSY      moles removed    CHOLECYSTECTOMY  2011    COLONOSCOPY N/A 1/17/2024    Procedure: COLONOSCOPY, FLEXIBLE, WITH LESION REMOVAL USING SNARE;  Surgeon: Bal Omer MD;  Location: [facility] OR    COLONOSCOPY WITH CO2 INSUFFLATION N/A 1/17/2024    Procedure: Colonoscopy with CO2 insufflation;  Surgeon: Bal Omer MD;  Location: [facility] OR    COLPOSCOPY CERVIX, BIOPSY CERVIX, ENDOCERVICAL CURETTAGE, COMBINED  1994    benign    EYE SURGERY  2009    lasix    EYE SURGERY      Lasik    HYSTERECTOMY, PAP NO LONGER INDICATED  05/25/2018    hysterectomy  with bilateral salpingectomy, ovaries remain       Social History     Tobacco Use    Smoking status: Never    Smokeless tobacco: Never    Tobacco comments:     nonsmoking household   Substance Use Topics    Alcohol use: Yes     Comment: occasional     Family History   Problem Relation Age of Onset    Cancer Mother         vulvar sarcoma    Hypertension Mother     Other Cancer Mother         vulvar sarcoma    Thyroid Disease Mother     Colon Polyps Father     Thyroid Disease Sister     Anxiety Disorder Sister     Thyroid Disease Sister     Hyperlipidemia Brother     Hypertension Maternal Grandmother     Cancer Maternal Grandfather         lung    Prostate Cancer Paternal Grandfather          Current Outpatient Medications   Medication Sig Dispense Refill    cetirizine-pseudoePHEDrine ER ([employer] ALLERGY RELIEF D) 5-120 MG 12 hr tablet TAKE 1 TABLET BY MOUTH TWICE A DAY*NOT COVD BY INSUR 180 tablet 3    citalopram (CELEXA) 20 MG tablet TAKE 1 TABLET BY MOUTH EVERY DAY 90 tablet 0    fluticasone (FLONASE) 50 MCG/ACT nasal spray SPRAY 1 SPRAY INTO BOTH NOSTRILS 2 TIMES DAILY 48 mL 3    LORazepam (ATIVAN) 1 MG tablet Take 1 tablet 30 minutes prior to flight and as needed for severe anxiety. 10 tablet 0    losartan (COZAAR) 50 MG tablet TAKE 1 TABLET BY MOUTH EVERY DAY 90 tablet 0    meclizine (ANTIVERT) 25 MG tablet Take 1 tablet (25 mg) by mouth 3 times daily as needed for dizziness 90 tablet 1    MELATONIN PO Take 5 mg by mouth      metFORMIN (GLUCOPHAGE) 500 MG tablet TAKE 1 TABLET BY MOUTH TWICE A DAY WITH FOOD 180 tablet 0    multivitamin w/minerals (THERA-VIT-M) tablet Take 1 tablet by mouth daily      triamcinolone (KENALOG) 0.1 % external ointment Apply thin layer twice daily to rash until resolved or up to 6 weeks. 30 g 2         Review of Systems  Constitutional, neuro, ENT, endocrine, pulmonary, cardiac, gastrointestinal, genitourinary, musculoskeletal, integument and psychiatric systems are negative, except as  "otherwise noted.     Objective    Exam  /84   Pulse 104   Temp 97.8  F (36.6  C) (Tympanic)   Resp 16   Ht 1.645 m (5' 4.75\")   Wt 83.9 kg (185 lb)   LMP 04/29/2018 (Exact Date)   SpO2 99%   Breastfeeding No   BMI 31.02 kg/m     Estimated body mass index is 31.02 kg/m  as calculated from the following:    Height as of this encounter: 1.645 m (5' 4.75\").    Weight as of this encounter: 83.9 kg (185 lb).    Physical Exam  GENERAL: alert and no distress  EYES: Eyes grossly normal to inspection, PERRL and conjunctivae and sclerae normal  HENT: ear canals and TM's normal, nose and mouth without ulcers or lesions  NECK: no adenopathy, no asymmetry, masses, or scars  RESP: lungs clear to auscultation - no rales, rhonchi or wheezes  BREAST: normal without masses, tenderness or nipple discharge and no palpable axillary masses or adenopathy  CV: regular rate and rhythm, normal S1 S2, no S3 or S4, no murmur, click or rub, no peripheral edema  ABDOMEN: soft, nontender, no hepatosplenomegaly, no masses and bowel sounds normal  MS: no gross musculoskeletal defects noted, no edema  SKIN: no suspicious lesions or rashes  NEURO: Normal strength and tone, mentation intact and speech normal  PSYCH: mentation appears normal, affect normal/bright        Signed Electronically by: Jacquelyn Zaldivar MD    "

## 2024-05-22 ENCOUNTER — OFFICE VISIT (OUTPATIENT)
Dept: DERMATOLOGY | Facility: CLINIC | Age: 49
End: 2024-05-22
Payer: COMMERCIAL

## 2024-05-22 DIAGNOSIS — D49.2 NEOPLASM OF SKIN: ICD-10-CM

## 2024-05-22 DIAGNOSIS — Z86.018 HISTORY OF DYSPLASTIC NEVUS: ICD-10-CM

## 2024-05-22 DIAGNOSIS — L81.4 LENTIGINES: ICD-10-CM

## 2024-05-22 DIAGNOSIS — D22.9 MULTIPLE BENIGN NEVI: ICD-10-CM

## 2024-05-22 DIAGNOSIS — B07.9 VERRUCA: ICD-10-CM

## 2024-05-22 DIAGNOSIS — D48.9 NEOPLASM OF UNCERTAIN BEHAVIOR: ICD-10-CM

## 2024-05-22 DIAGNOSIS — D18.01 CHERRY ANGIOMA: ICD-10-CM

## 2024-05-22 DIAGNOSIS — L82.1 SEBORRHEIC KERATOSES: Primary | ICD-10-CM

## 2024-05-22 PROCEDURE — 17110 DESTRUCTION B9 LES UP TO 14: CPT | Performed by: PHYSICIAN ASSISTANT

## 2024-05-22 PROCEDURE — 88305 TISSUE EXAM BY PATHOLOGIST: CPT | Performed by: DERMATOLOGY

## 2024-05-22 PROCEDURE — 99213 OFFICE O/P EST LOW 20 MIN: CPT | Mod: 25 | Performed by: PHYSICIAN ASSISTANT

## 2024-05-22 PROCEDURE — 11102 TANGNTL BX SKIN SINGLE LES: CPT | Mod: XS | Performed by: PHYSICIAN ASSISTANT

## 2024-05-22 NOTE — LETTER
5/22/2024         RE: Ivania Gomez  [id number] [facility] [address]        Dear Colleague,    Thank you for referring your patient, Ivania Gomez, to the [facility]. Please see a copy of my visit note below.    [facility] Dermatology Note  Encounter Date: May 22, 2024  Office Visit      Dermatology Problem List:  FBSE: 5/22/24    #NUB L mid back, S/p Biopsy performed on 5/22/24: Pending results.  1. History of dysplastic nevi  - Compound dysplastic nevus with mild atypia, mid lower back. Bx proven on 5/17/23 ** regimentation noted in the central of the scar  -Compound dysplastic nevus with mild atypia, upper mid back, bx 12/22/20  -Collision of two compound dysplastic nevi, both with mild atypia, left upper arm, s/p biopsy 5/17/2016  -Compound dysplastic melanocytic nevus, central chest, 6/14/2012    -Junctional dysplastic nevus with moderate atypia, margins narrowly free, 2010, right arm medial  -Compound dysplastic nevus with mild atypia, margins free, 2010, right arm lateral  -Compound dysplastic nevus with moderate atypia, margins free, left forearm, 2010  -Compound dysplastic nevus with mild atypia, left chest, 2010  -Compound dysplastic nevus with mild atypia, margins free, right inner arm, 2010  -Compound dysplastic nevus with moderate atypia, right ear, 2009  -Junctional dysplastic nevus with moderate atypia, right buttock, s/p excision 2009  -Junctional dysplastic nevus with moderate ayptia, negative margin, right posterior shoulder, 2009  -Inflamed junctional dysplastic nevus with moderate atypica, right superior shoulder, neg margins, 2009  -Junctional dysplastic nevus with moderate atypia, right anterior arm, 2009  -Junctional dysplastic nevus with moderate atypia, left superior shoulder, left inferior shoulder, left inner arm, 2009  -Dysplastic nevi, right chest and left breast  -Irritated compound nevus, left buttock with mild to  moderate cystologic atypia, 2007  2. Acne vulgaris  -Current Tx: Tretinoin 0.025% cream  -Previous Tx:  Differin, minocycline with dyspigmentation, doxycycline  3. Halo nevus, right neck, 2010  4. Benign biopsies  - Hemangioma, R abdomen, Bx proven on 5/17/23  - Compound melanocytic nevus, left third toe, bx 12/22/20  5. Dermatitis, left lateral inferior flank  - Empiric TAC 0.1% cream, consider biopsy if fails to resolve after 1 month  6. Flat wart, x 1 R nasal ala S/p cryo      Family History: Aunt with melanoma. Mom had vulvar sarcoma.  Social History:  for [employer] school. Has 2 boys.  ____________________________________________    Assessment & Plan:    # Neoplasm of unspecified behavior of the skin (D49.2) on the L mid back. The differential diagnosis includes Benign irritated nevus vs other. .   - Shave biopsy performed today, see procedure note below.   - Photographed today     # Flat wart x 1 R nasal ala   - Cryotherapy performed, see procedure note below    # Hx of Dysplastic Nevus  - Sunscreen: Apply 20 minutes prior to going outdoors and reapply every two hours, when wet or sweating. We recommend using an SPF 30 or higher, and to use one that is water resistant.     - Advised to monitor for changing, non-healing, bleeding, painful, changing, or otherwise symptomatic lesions  - Continue annual skin exams    # Benign findings: multiple benign nevi, lentigines, cherry angiomas, SKs  - edu on benign etiology  - Signs and Symptoms of non-melanoma skin cancer and ABCDEs of melanoma reviewed with patient. Patient encouraged to perform monthly self skin exams and educated on how to perform them. UV precautions reviewed with patient. Patient was asked about new or changing moles/lesions on body.   - Sunscreen: Apply 20 minutes prior to going outdoors and reapply every two hours, when wet or sweating. We recommend using an SPF 30 or higher, and to use one that is water resistant.     - RTC for  changes    Procedures Performed:   - Shave biopsy procedure note, location(s): see above. After discussion of benefits and risks including but not limited to bleeding, infection, scar, incomplete removal, recurrence, and non-diagnostic biopsy, written consent and photographs were obtained. The area was cleaned with isopropyl alcohol. 0.5mL of 1% lidocaine with epinephrine was injected to obtain adequate anesthesia of lesion(s). Shave biopsy at site(s) performed. Hemostasis was achieved with aluminium chloride. Petrolatum ointment and a sterile dressing were applied. The patient tolerated the procedure and no complications were noted. The patient was provided with verbal and written post care instructions.      Follow-up: pending path results    Staff and scribe:    Scribe Disclosure:   I, DEREK JOEL, am serving as a scribe; to document services personally performed by Tayler Montalvo PA-C -based on data collection and the provider's statements to me.     Provider Disclosure:  I agree with above History, Review of Systems, Physical exam and Plan.  I have reviewed the content of the documentation and have edited it as needed. I have personally performed the services documented here and the documentation accurately represents those services and the decisions I have made.      Electronically signed by:    All risks, benefits and alternatives were discussed with patient.  Patient is in agreement and understands the assessment and plan.  All questions were answered.    Tayler Montalvo PA-C, MPAS  [facility]: Phone: [phone], Fax: [phone]  [facility]: Phone: [phone],  Fax: [phone]  [facility]: Phone: [phone], Fax: [phone]  ____________________________________________    CC: Skin Check (Pt is here for a FBSC. L side of the nose. )      Reviewed patients past medical history and  pertinent chart review prior to patient's visit today.     HPI:  Ms. Ivania Gomez is a 48 year old female who presents today as a return patient for [facility]. Last visit on 5/17/23 she had two biopsy performed, one showing a hemangioma and a compound dysplastic nevus with mild atypia.     Today patient reported a spot of concern on the L side of her nose.     Patient has a hx of DN. Denied any personal or family hx of skin cancer.     Patient is otherwise feeling well, without additional concerns.    Labs:  N/A    Physical Exam:  Vitals: LMP 04/29/2018 (Exact Date)   SKIN: Full skin, which includes the head/face, both arms, chest, back, abdomen,both legs, genitalia and/or groin buttocks, digits and/or nails, was examined.   -  Al's skin type II, has <100 nevi  - There are dome shaped bright red papules on the trunk.   - Multiple regular brown pigmented macules and papules are identified on the trunk and extremities.   - Scattered brown macules on sun exposed areas.  - There are waxy stuck on tan to brown papules on the trunk.   - 2mm verrucous papule present on her R nasal ala.   - there are yellow discolored patches on her medial canthus possible represent early xanthelasma.   - L mid back there is a 1 cm brown sessile papule   - previously biopsied DN on her Mid lower back was noted to have regimentation noted in the central of the scar  - No other lesions of concern on areas examined.         Medications:  Current Outpatient Medications   Medication Sig Dispense Refill     cetirizine-pseudoePHEDrine ER ([employer] ALLERGY RELIEF D) 5-120 MG 12 hr tablet TAKE 1 TABLET BY MOUTH TWICE A DAY*NOT COVD BY INSUR 180 tablet 3     citalopram (CELEXA) 20 MG tablet Take 1 tablet (20 mg) by mouth daily 90 tablet 1     fluticasone (FLONASE) 50 MCG/ACT nasal spray SPRAY 1 SPRAY INTO BOTH NOSTRILS 2 TIMES DAILY 48 mL 3     losartan (COZAAR) 50 MG tablet Take 1 tablet (50 mg) by mouth daily 90 tablet 1     MELATONIN PO Take 5 mg  by mouth       metFORMIN (GLUCOPHAGE) 500 MG tablet TAKE 1 TABLET BY MOUTH TWICE A DAY WITH FOOD 180 tablet 1     multivitamin w/minerals (THERA-VIT-M) tablet Take 1 tablet by mouth daily       LORazepam (ATIVAN) 1 MG tablet Take 1 tablet 30 minutes prior to flight and as needed for severe anxiety. (Patient not taking: Reported on 5/22/2024) 10 tablet 0     meclizine (ANTIVERT) 25 MG tablet Take 1 tablet (25 mg) by mouth 3 times daily as needed for dizziness (Patient not taking: Reported on 5/22/2024) 90 tablet 1     triamcinolone (KENALOG) 0.1 % external ointment Apply thin layer twice daily to rash until resolved or up to 6 weeks. (Patient not taking: Reported on 5/22/2024) 30 g 2     No current facility-administered medications for this visit.      Past Medical/Surgical History:   Patient Active Problem List   Diagnosis     CARDIOVASCULAR SCREENING; LDL GOAL LESS THAN 160     Mild major depression (H)     PCOS (polycystic ovarian syndrome)     S/P laparoscopic cholecystectomy     Acne vulgaris     History of dysplastic nevus     Melanocytic nevus     History of gestational diabetes     Chronic rhinitis     Hypertriglyceridemia     Submucous leiomyoma of uterus     Excessive or frequent menstruation     Anxiety     Hypertension goal BP (blood pressure) < 140/90     Major depressive disorder, recurrent episode, mild (H24)     Seasonal allergic rhinitis, unspecified trigger     Past Medical History:   Diagnosis Date     Acne vulgaris      Allergies      Anxiety      ASCUS on Pap smear 1994    colp benign     Depressive disorder     post partum and anxiety issues     Diabetes mellitus (H)      History of dysplastic nevus      Hoffa's fat pad disease (H)      Hypertension goal BP (blood pressure) < 140/90 02/08/2023     Melanocytic nevus 06/14/2012     Obesity, unspecified      PCOS (polycystic ovarian syndrome)                         The following medication was given:     MEDICATION:  Lidocaine with epinephrine 1%  1:[id number]  ROUTE: SQ  SITE: see procedure note  DOSE: 1mL  LOT #: [id number]  : [employer]  EXPIRATION DATE: 04-  NDC#: 36520-814-35  Was there drug waste? no  Multi-dose vial: Yes    Jes Tong LPN  May 22, 2024      Again, thank you for allowing me to participate in the care of your patient.        Sincerely,        Tayler Montalvo PA-C

## 2024-05-22 NOTE — NURSING NOTE
Ivania Gomez's goals for this visit include:   Chief Complaint   Patient presents with    Skin Check     Pt is here for a FBSC. L side of the nose.        She requests these members of her care team be copied on today's visit information:     PCP: Jacquelyn Zaldivar    Referring Provider:  No referring provider defined for this encounter.    LMP 04/29/2018 (Exact Date)     Do you need any medication refills at today's visit?     eJs Tong LPN on 5/22/2024 at 8:35 AM

## 2024-05-22 NOTE — PATIENT INSTRUCTIONS
Patient Education       Proper skin care from [facility]:    -Eliminate harsh soaps as they strip the natural oils from the skin, often resulting in dry itchy skin ( i.e. Dial, Zest, Maltese Spring)  -Use mild soaps such as Cetaphil or Dove Sensitive Skin in the shower. You do not need to use soap on arms, legs, and trunk every time you shower unless visibly soiled.   -Avoid hot or cold showers.  -After showering, lightly dry off and apply moisturizing within 2-3 minutes. This will help trap moisture in the skin.   -Aggressive use of a moisturizer at least 1-2 times a day to the entire body (including -Vanicream, Cetaphil, Aquaphor or Cerave) and moisturize hands after every washing.  -We recommend using moisturizers that come in a tub that needs to be scooped out, not a pump. This has more of an oil base. It will hold moisture in your skin much better than a water base moisturizer. The above recommended are non-pore clogging.      Wear a sunscreen with at least SPF 30 on your face, ears, neck and V of the chest daily. Wear sunscreen on other areas of the body if those areas are exposed to the sun throughout the day. Sunscreens can contain physical and/or chemical blockers. Physical blockers are less likely to clog pores, these include zinc oxide and titanium dioxide. Reapply every two hour and after swimming.     Sunscreen examples: https://www.ewg.org/sunscreen/    UV radiation  UVA radiation remains constant throughout the day and throughout the year. It is a longer wavelength than UVB and therefore penetrates deeper into the skin leading to immediate and delayed tanning, photoaging, and skin cancer. 70-80% of UVA and UVB radiation occurs between the hours of 10am-2pm.  UVB radiation  UVB radiation causes the most harmful effects and is more significant during the summer months. However, snow and ice can reflect UVB radiation leading to skin damage during the winter months as well. UVB radiation is  responsible for tanning, burning, inflammation, delayed erythema (pinkness), pigmentation (brown spots), and skin cancer.     I recommend self monthly full body exams and yearly full body exams with a dermatology provider. If you develop a new or changing lesion please follow up for examination. Most skin cancers are pink and scaly or pink and pearly. However, we do see blue/brown/black skin cancers.  Consider the ABCDEs of melanoma when giving yourself your monthly full body exam ( don't forget the groin, buttocks, feet, toes, etc). A-asymmetry, B-borders, C-color, D-diameter, E-elevation or evolving. If you see any of these changes please follow up in clinic. If you cannot see your back I recommend purchasing a hand held mirror to use with a larger wall mirror.       Checking for Skin Cancer  You can find cancer early by checking your skin each month. There are 3 kinds of skin cancer. They are melanoma, basal cell carcinoma, and squamous cell carcinoma. Doing monthly skin checks is the best way to find new marks or skin changes. Follow the instructions below for checking your skin.   The ABCDEs of checking moles for melanoma   Check your moles or growths for signs of melanoma using ABCDE:   Asymmetry: the sides of the mole or growth don t match  Border: the edges are ragged, notched, or blurred  Color: the color within the mole or growth varies  Diameter: the mole or growth is larger than 6 mm (size of a pencil eraser)  Evolving: the size, shape, or color of the mole or growth is changing (evolving is not shown in the images below)    Checking for other types of skin cancer  Basal cell carcinoma or squamous cell carcinoma have symptoms such as:     A spot or mole that looks different from all other marks on your skin  Changes in how an area feels, such as itching, tenderness, or pain  Changes in the skin's surface, such as oozing, bleeding, or scaliness  A sore that does not heal  New swelling or redness beyond  the border of a mole    Who s at risk?  Anyone can get skin cancer. But you are at greater risk if you have:   Fair skin, light-colored hair, or light-colored eyes  Many moles or abnormal moles on your skin  A history of sunburns from sunlight or tanning beds  A family history of skin cancer  A history of exposure to radiation or chemicals  A weakened immune system  If you have had skin cancer in the past, you are at risk for recurring skin cancer.   How to check your skin  Do your monthly skin checkups in front of a full-length mirror. Check all parts of your body, including your:   Head (ears, face, neck, and scalp)  Torso (front, back, and sides)  Arms (tops, undersides, upper, and lower armpits)  Hands (palms, backs, and fingers, including under the nails)  Buttocks and genitals  Legs (front, back, and sides)  Feet (tops, soles, toes, including under the nails, and between toes)  If you have a lot of moles, take digital photos of them each month. Make sure to take photos both up close and from a distance. These can help you see if any moles change over time.   Most skin changes are not cancer. But if you see any changes in your skin, call your doctor right away. Only he or she can diagnose a problem. If you have skin cancer, seeing your doctor can be the first step toward getting the treatment that could save your life.   "[employer] last reviewed this educational content on 4/1/2019 2000-2020 [employer]. [address] [place] [address], [place] [address]. All rights reserved. This information is not intended as a substitute for professional medical care. Always follow your healthcare professional's instructions.         Cryotherapy    What is it?  Use of a very cold liquid, such as liquid nitrogen, to freeze and destroy abnormal skin cells that need to be removed    What should I expect?  Tenderness and redness  A small blister that might grow and fill with dark purple blood. There may be crusting.  More  than one treatment may be needed if the lesions do not go away.    How do I care for the treated area?  Gently wash the area with your hands when bathing.  Use a thin layer of Vaseline to help with healing. You may use a Band-Aid.   The area should heal within 7-10 days and may leave behind a pink or lighter color.   Do not use an antibiotic or Neosporin ointment.   You may take acetaminophen (Tylenol) for pain.     Call your Doctor if you have:  Severe pain  Signs of infection (warmth, redness, cloudy yellow drainage, and or a bad smell)  Questions or concerns    Who should I call with questions?      [facility]: [phone]      [facility]: [phone]      For urgent needs outside of business hours call the [facility] at [phone]        and ask for the dermatology resident on call    Wound Care After a Biopsy    What is a skin biopsy?  A skin biopsy allows the doctor to examine a very small piece of tissue under the microscope to determine the diagnosis and the best treatment for the skin condition. A local anesthetic (numbing medicine)  is injected with a very small needle into the skin area to be tested. A small piece of skin is taken from the area. Sometimes a suture (stitch) is used.     What are the risks of a skin biopsy?  I will experience scar, bleeding, swelling, pain, crusting and redness. I may experience incomplete removal or recurrence. Risks of this procedure are excessive bleeding, bruising, infection, nerve damage, numbness, thick (hypertrophic or keloidal) scar and non-diagnostic biopsy.    How should I care for my wound for the first 24 hours?  Keep the wound dry and covered for 24 hours  If it bleeds, hold direct pressure on the area for 15 minutes. If bleeding does not stop then go to the emergency room  Avoid strenuous exercise the first 1-2 days or as your doctor instructs you    How should I care for the wound  after 24 hours?  After 24 hours, remove the bandage  You may bathe or shower as normal  If you had a scalp biopsy, you can shampoo as usual and can use shower water to clean the biopsy site daily  Clean the wound twice a day with gentle soap and water  Do not scrub, be gentle  Apply white petroleum/Vaseline after cleaning the wound with a cotton swab or a clean finger, and keep the site covered with a Bandaid /bandage. Bandages are not necessary with a scalp biopsy  If you are unable to cover the site with a Bandaid /bandage, re-apply ointment 2-3 times a day to keep the site moist. Moisture will help with healing  Avoid strenuous activity for first 1-2 days  Avoid lakes, rivers, pools, and oceans until the stitches are removed or the site is healed    How do I clean my wound?  Wash hands thoroughly with soap or use hand  before all wound care  Clean the wound with gentle soap and water  Apply white petroleum/Vaseline  to wound after it is clean  Replace the Bandaid /bandage to keep the wound covered for the first few days or as instructed by your doctor  If you had a scalp biopsy, warm shower water to the area on a daily basis should suffice    What should I use to clean my wound?   Cotton-tipped applicators (Qtips )  White petroleum jelly (Vaseline ). Use a clean new container and use Q-tips to apply.  Bandaids   as needed  Gentle soap     How should I care for my wound long term?  Do not get your wound dirty  Keep up with wound care for one week or until the area is healed.  A small scab will form and fall off by itself when the area is completely healed. The area will be red and will become pink in color as it heals. Sun protection is very important for how your scar will turn out. Sunscreen with an SPF 30 or greater is recommended once the area is healed.  If you have stitches, stitches need to be removed in 10 days. You may return to our clinic for this or you may have it done locally at your doctor s  office.  You should have some soreness but it should be mild and slowly go away over several days. Talk to your doctor about using tylenol for pain,    When should I call my doctor?  If you have increased:   Pain or swelling  Pus or drainage (clear or slightly yellow drainage is ok)  Temperature over 100F  Spreading redness or warmth around wound    When will I hear about my results?  The biopsy results can take 2-3 weeks to come back. The clinic will call you with the results, send you a Optyn[employer] message, or have you schedule a follow-up clinic or phone time to discuss the results. Contact our clinics if you do not hear from us in 3 weeks.     Who should I call with questions?  [facility]: [phone]   [facility]: [phone]  For urgent needs outside of business hours call the [facility] at [phone] and ask for the dermatology resident on call

## 2024-05-22 NOTE — PROGRESS NOTES
The following medication was given:     MEDICATION:  Lidocaine with epinephrine 1% 1:[id number]  ROUTE: SQ  SITE: see procedure note  DOSE: 1mL  LOT #: [id number]  : [employer]  EXPIRATION DATE: 04-  NDC#: 22214-294-70  Was there drug waste? no  Multi-dose vial: Yes    Jes Tong LPN  May 22, 2024

## 2024-05-22 NOTE — PROGRESS NOTES
[facility] Dermatology Note  Encounter Date: May 22, 2024  Office Visit      Dermatology Problem List:  FBSE: 5/22/24    #NUB L mid back, S/p Biopsy performed on 5/22/24: Pending results.  1. History of dysplastic nevi  - Compound dysplastic nevus with mild atypia, mid lower back. Bx proven on 5/17/23 ** regimentation noted in the central of the scar  -Compound dysplastic nevus with mild atypia, upper mid back, bx 12/22/20  -Collision of two compound dysplastic nevi, both with mild atypia, left upper arm, s/p biopsy 5/17/2016  -Compound dysplastic melanocytic nevus, central chest, 6/14/2012    -Junctional dysplastic nevus with moderate atypia, margins narrowly free, 2010, right arm medial  -Compound dysplastic nevus with mild atypia, margins free, 2010, right arm lateral  -Compound dysplastic nevus with moderate atypia, margins free, left forearm, 2010  -Compound dysplastic nevus with mild atypia, left chest, 2010  -Compound dysplastic nevus with mild atypia, margins free, right inner arm, 2010  -Compound dysplastic nevus with moderate atypia, right ear, 2009  -Junctional dysplastic nevus with moderate atypia, right buttock, s/p excision 2009  -Junctional dysplastic nevus with moderate ayptia, negative margin, right posterior shoulder, 2009  -Inflamed junctional dysplastic nevus with moderate atypica, right superior shoulder, neg margins, 2009  -Junctional dysplastic nevus with moderate atypia, right anterior arm, 2009  -Junctional dysplastic nevus with moderate atypia, left superior shoulder, left inferior shoulder, left inner arm, 2009  -Dysplastic nevi, right chest and left breast  -Irritated compound nevus, left buttock with mild to moderate cystologic atypia, 2007  2. Acne vulgaris  -Current Tx: Tretinoin 0.025% cream  -Previous Tx:  Differin, minocycline with dyspigmentation, doxycycline  3. Halo nevus, right neck, 2010  4. Benign biopsies  - Hemangioma, R abdomen, Bx proven on 5/17/23  -  Compound melanocytic nevus, left third toe, bx 12/22/20  5. Dermatitis, left lateral inferior flank  - Empiric TAC 0.1% cream, consider biopsy if fails to resolve after 1 month  6. Flat wart, x 1 R nasal ala S/p cryo      Family History: Aunt with melanoma. Mom had vulvar sarcoma.  Social History:  for [employer] school. Has 2 boys.  ____________________________________________    Assessment & Plan:    # Neoplasm of unspecified behavior of the skin (D49.2) on the L mid back. The differential diagnosis includes Benign irritated nevus vs other. .   - Shave biopsy performed today, see procedure note below.   - Photographed today     # Flat wart x 1 R nasal ala   - Cryotherapy performed, see procedure note below    # Hx of Dysplastic Nevus  - Sunscreen: Apply 20 minutes prior to going outdoors and reapply every two hours, when wet or sweating. We recommend using an SPF 30 or higher, and to use one that is water resistant.     - Advised to monitor for changing, non-healing, bleeding, painful, changing, or otherwise symptomatic lesions  - Continue annual skin exams    # Benign findings: multiple benign nevi, lentigines, cherry angiomas, SKs  - edu on benign etiology  - Signs and Symptoms of non-melanoma skin cancer and ABCDEs of melanoma reviewed with patient. Patient encouraged to perform monthly self skin exams and educated on how to perform them. UV precautions reviewed with patient. Patient was asked about new or changing moles/lesions on body.   - Sunscreen: Apply 20 minutes prior to going outdoors and reapply every two hours, when wet or sweating. We recommend using an SPF 30 or higher, and to use one that is water resistant.     - RTC for changes    Procedures Performed:   - Shave biopsy procedure note, location(s): see above. After discussion of benefits and risks including but not limited to bleeding, infection, scar, incomplete removal, recurrence, and non-diagnostic biopsy, written consent and  photographs were obtained. The area was cleaned with isopropyl alcohol. 0.5mL of 1% lidocaine with epinephrine was injected to obtain adequate anesthesia of lesion(s). Shave biopsy at site(s) performed. Hemostasis was achieved with aluminium chloride. Petrolatum ointment and a sterile dressing were applied. The patient tolerated the procedure and no complications were noted. The patient was provided with verbal and written post care instructions.      Follow-up: pending path results    Staff and scribe:    Scribe Disclosure:   I, DEREK JEOL, am serving as a scribe; to document services personally performed by Tayler Montalvo PA-C -based on data collection and the provider's statements to me.     Provider Disclosure:  I agree with above History, Review of Systems, Physical exam and Plan.  I have reviewed the content of the documentation and have edited it as needed. I have personally performed the services documented here and the documentation accurately represents those services and the decisions I have made.      Electronically signed by:    All risks, benefits and alternatives were discussed with patient.  Patient is in agreement and understands the assessment and plan.  All questions were answered.    Tayler Montalvo PA-C, MPAS  [facility] [place]: Phone: [phone], Fax: [phone]  [facility]: Phone: [phone],  Fax: [phone]  [facility]: Phone: [phone], Fax: [phone]  ____________________________________________    CC: Skin Check (Pt is here for a FBSC. L side of the nose. )      Reviewed patients past medical history and pertinent chart review prior to patient's visit today.     HPI:  Ms. Ivania Gomez is a 48 year old female who presents today as a return patient for FBSC. Last visit on 5/17/23 she had two biopsy performed, one showing a hemangioma and a compound dysplastic nevus  with mild atypia.     Today patient reported a spot of concern on the L side of her nose.     Patient has a hx of DN. Denied any personal or family hx of skin cancer.     Patient is otherwise feeling well, without additional concerns.    Labs:  N/A    Physical Exam:  Vitals: LMP 04/29/2018 (Exact Date)   SKIN: Full skin, which includes the head/face, both arms, chest, back, abdomen,both legs, genitalia and/or groin buttocks, digits and/or nails, was examined.   -  Al's skin type II, has <100 nevi  - There are dome shaped bright red papules on the trunk.   - Multiple regular brown pigmented macules and papules are identified on the trunk and extremities.   - Scattered brown macules on sun exposed areas.  - There are waxy stuck on tan to brown papules on the trunk.   - 2mm verrucous papule present on her R nasal ala.   - there are yellow discolored patches on her medial canthus possible represent early xanthelasma.   - L mid back there is a 1 cm brown sessile papule   - previously biopsied DN on her Mid lower back was noted to have regimentation noted in the central of the scar  - No other lesions of concern on areas examined.         Medications:  Current Outpatient Medications   Medication Sig Dispense Refill    cetirizine-pseudoePHEDrine ER ([employer] ALLERGY RELIEF D) 5-120 MG 12 hr tablet TAKE 1 TABLET BY MOUTH TWICE A DAY*NOT COVD BY INSUR 180 tablet 3    citalopram (CELEXA) 20 MG tablet Take 1 tablet (20 mg) by mouth daily 90 tablet 1    fluticasone (FLONASE) 50 MCG/ACT nasal spray SPRAY 1 SPRAY INTO BOTH NOSTRILS 2 TIMES DAILY 48 mL 3    losartan (COZAAR) 50 MG tablet Take 1 tablet (50 mg) by mouth daily 90 tablet 1    MELATONIN PO Take 5 mg by mouth      metFORMIN (GLUCOPHAGE) 500 MG tablet TAKE 1 TABLET BY MOUTH TWICE A DAY WITH FOOD 180 tablet 1    multivitamin w/minerals (THERA-VIT-M) tablet Take 1 tablet by mouth daily      LORazepam (ATIVAN) 1 MG tablet Take 1 tablet 30 minutes prior to flight and as  needed for severe anxiety. (Patient not taking: Reported on 5/22/2024) 10 tablet 0    meclizine (ANTIVERT) 25 MG tablet Take 1 tablet (25 mg) by mouth 3 times daily as needed for dizziness (Patient not taking: Reported on 5/22/2024) 90 tablet 1    triamcinolone (KENALOG) 0.1 % external ointment Apply thin layer twice daily to rash until resolved or up to 6 weeks. (Patient not taking: Reported on 5/22/2024) 30 g 2     No current facility-administered medications for this visit.      Past Medical/Surgical History:   Patient Active Problem List   Diagnosis    CARDIOVASCULAR SCREENING; LDL GOAL LESS THAN 160    Mild major depression (H)    PCOS (polycystic ovarian syndrome)    S/P laparoscopic cholecystectomy    Acne vulgaris    History of dysplastic nevus    Melanocytic nevus    History of gestational diabetes    Chronic rhinitis    Hypertriglyceridemia    Submucous leiomyoma of uterus    Excessive or frequent menstruation    Anxiety    Hypertension goal BP (blood pressure) < 140/90    Major depressive disorder, recurrent episode, mild (H24)    Seasonal allergic rhinitis, unspecified trigger     Past Medical History:   Diagnosis Date    Acne vulgaris     Allergies     Anxiety     ASCUS on Pap smear 1994    colp benign    Depressive disorder     post partum and anxiety issues    Diabetes mellitus (H)     History of dysplastic nevus     Hoffa's fat pad disease (H)     Hypertension goal BP (blood pressure) < 140/90 02/08/2023    Melanocytic nevus 06/14/2012    Obesity, unspecified     PCOS (polycystic ovarian syndrome)

## 2024-08-08 ENCOUNTER — ANCILLARY PROCEDURE (OUTPATIENT)
Dept: MAMMOGRAPHY | Facility: CLINIC | Age: 49
End: 2024-08-08
Attending: FAMILY MEDICINE
Payer: COMMERCIAL

## 2024-08-08 DIAGNOSIS — Z12.31 VISIT FOR SCREENING MAMMOGRAM: ICD-10-CM

## 2024-08-08 PROCEDURE — 77063 BREAST TOMOSYNTHESIS BI: CPT | Mod: TC | Performed by: RADIOLOGY

## 2024-08-08 PROCEDURE — 77067 SCR MAMMO BI INCL CAD: CPT | Mod: TC | Performed by: RADIOLOGY

## 2024-09-04 ENCOUNTER — OFFICE VISIT (OUTPATIENT)
Dept: OPTOMETRY | Facility: CLINIC | Age: 49
End: 2024-09-04
Payer: COMMERCIAL

## 2024-09-04 DIAGNOSIS — H52.223 REGULAR ASTIGMATISM OF BOTH EYES: ICD-10-CM

## 2024-09-04 DIAGNOSIS — Z01.01 VISION EXAM WITH ABNORMAL FINDINGS: Primary | ICD-10-CM

## 2024-09-04 DIAGNOSIS — H52.4 PRESBYOPIA: ICD-10-CM

## 2024-09-04 DIAGNOSIS — H04.123 DRY EYES: ICD-10-CM

## 2024-09-04 DIAGNOSIS — H02.88A MEIBOMIAN GLAND DYSFUNCTION (MGD) OF UPPER AND LOWER LIDS OF BOTH EYES: ICD-10-CM

## 2024-09-04 DIAGNOSIS — H00.15 CHALAZION OF LEFT LOWER EYELID: ICD-10-CM

## 2024-09-04 DIAGNOSIS — H02.88B MEIBOMIAN GLAND DYSFUNCTION (MGD) OF UPPER AND LOWER LIDS OF BOTH EYES: ICD-10-CM

## 2024-09-04 PROCEDURE — 92015 DETERMINE REFRACTIVE STATE: CPT | Performed by: OPTOMETRIST

## 2024-09-04 PROCEDURE — 92004 COMPRE OPH EXAM NEW PT 1/>: CPT | Performed by: OPTOMETRIST

## 2024-09-04 ASSESSMENT — REFRACTION_MANIFEST
OS_SPHERE: -0.75
OS_AXIS: 025
OS_SPHERE: +0.25
OD_SPHERE: -0.25
OD_CYLINDER: +0.50
OD_CYLINDER: +0.25
OS_ADD: +2.25
OD_ADD: +2.25
OD_SPHERE: PLANO
METHOD_AUTOREFRACTION: 1
OD_AXIS: 087
OS_CYLINDER: +0.50
OD_AXIS: 090

## 2024-09-04 ASSESSMENT — KERATOMETRY
OS_K2POWER_DIOPTERS: 44.25
OD_K1POWER_DIOPTERS: 44.00
OS_K1POWER_DIOPTERS: 43.75
OD_K2POWER_DIOPTERS: 44.50
OD_AXISANGLE2_DEGREES: 180
OS_AXISANGLE2_DEGREES: 148

## 2024-09-04 ASSESSMENT — REFRACTION_WEARINGRX
OD_SPHERE: +1.25
OS_SPHERE: +1.25
SPECS_TYPE: OTC'S

## 2024-09-04 ASSESSMENT — SLIT LAMP EXAM - LIDS: COMMENTS: 2+ MEIBOMIAN GLAND DYSFUNCTION

## 2024-09-04 ASSESSMENT — CONF VISUAL FIELD
OD_SUPERIOR_NASAL_RESTRICTION: 0
OD_SUPERIOR_TEMPORAL_RESTRICTION: 0
OD_INFERIOR_TEMPORAL_RESTRICTION: 0
METHOD: COUNTING FINGERS
OD_INFERIOR_NASAL_RESTRICTION: 0
OD_NORMAL: 1

## 2024-09-04 ASSESSMENT — VISUAL ACUITY
OS_SC: 20/20
METHOD: SNELLEN - LINEAR
CORRECTION_TYPE: GLASSES
OS_CC: 20/40
OD_CC: 20/40-1
OD_SC: 20/20

## 2024-09-04 ASSESSMENT — TONOMETRY
OD_IOP_MMHG: 18
IOP_METHOD: APPLANATION
OS_IOP_MMHG: 18

## 2024-09-04 ASSESSMENT — CUP TO DISC RATIO
OD_RATIO: 0.1
OS_RATIO: 0.1

## 2024-09-04 NOTE — PATIENT INSTRUCTIONS
Fill glasses prescription  Allow 2 weeks to adapt to change in glasses  Over the counter readers for computer right lens +1.50, left -1.00, or try +1.50     Use over the counter artificial tears 2-3 times a day ( Thera Tears, Refresh Relieva , Systane Complete )   Use Refresh Optive Gel drops or Systane Gel drops  before sleep   Use warm compresses 10 minutes - Christine warm compress- holds heat and moisture       Return in 1 year for eye exam    Elaine Mcgee O.D.  [facility]  [address] [place] [address] [phone]

## 2024-09-04 NOTE — PROGRESS NOTES
Chief Complaint   Patient presents with    COMPREHENSIVE EYE EXAM         Last Eye Exam: 5+ years   Dilated Previously: Yes    What are you currently using to see?  readers       Distance Vision Acuity: Noticed gradual change in both eyes over the years since her LASIK surgery     Near Vision Acuity: Satisfied with vision while reading and using computer with readers, states things need to be bigger and needs more lighting     Eye Comfort: good, itchy at times,  water at times when she's tired. She does have seasonal allergies that affect eyes  Around Easter she went in for a swollen whole right upper lid- did not thinck it was a stye , she was given erythromycin ophthalmic ointment and it went away.  History of styes in childhood.  Do you use eye drops? : No  Occupation or Hobbies:  at [employer]     Samina Dawn Optometric Assistant           Medical, surgical and family histories reviewed and updated 9/4/2024.       LASIK 2009        OBJECTIVE: See Ophthalmology exam    ASSESSMENT:    ICD-10-CM    1. Vision exam with abnormal findings  Z01.01 EYE EXAM (SIMPLE-NONBILLABLE)     REFRACTION      2. Chalazion of left lower eyelid  H00.15 EYE EXAM (SIMPLE-NONBILLABLE)     REFRACTION      3. Meibomian gland dysfunction (MGD) of upper and lower lids of both eyes  H02.88A EYE EXAM (SIMPLE-NONBILLABLE)    H02.88B REFRACTION      4. Dry eyes  H04.123 EYE EXAM (SIMPLE-NONBILLABLE)     REFRACTION      5. Presbyopia  H52.4 EYE EXAM (SIMPLE-NONBILLABLE)     REFRACTION      6. Regular astigmatism of both eyes  H52.223 EYE EXAM (SIMPLE-NONBILLABLE)     REFRACTION          PLAN:     Patient Instructions   Fill glasses prescription  Allow 2 weeks to adapt to change in glasses  Over the counter readers for computer right lens +1.50, left -1.00, or try +1.50     Use over the counter artificial tears 2-3 times a day ( Thera Tears, Refresh Relieva , Systane Complete )   Use Refresh Optive Gel drops or Systane Gel  drops  before sleep   Use warm compresses 10 minutes - Christine warm compress- holds heat and moisture       Return in 1 year for eye exam    Elaine Mcgee O.D.  [facility]  [address] [place] [address] [phone]

## 2024-09-04 NOTE — LETTER
9/4/2024      Ivania Gomez  [address] [facility] [address]      Dear Colleague,    Thank you for referring your patient, Ivania Gomez, to the [facility]. Please see a copy of my visit note below.    Chief Complaint   Patient presents with     COMPREHENSIVE EYE EXAM         Last Eye Exam: 5+ years   Dilated Previously: Yes    What are you currently using to see?  readers       Distance Vision Acuity: Noticed gradual change in both eyes over the years since her LASIK surgery     Near Vision Acuity: Satisfied with vision while reading and using computer with readers, states things need to be bigger and needs more lighting     Eye Comfort: good, itchy at times,  water at times when she's tired. She does have seasonal allergies that affect eyes  Around Easter she went in for a swollen right upper lid, she was given erythromycin ophthalmic ointment and it went away.  History of styes in childhood.  Do you use eye drops? : No  Occupation or Hobbies:  at [employer]     Samina Dawn Optometric Assistant           Medical, surgical and family histories reviewed and updated 9/4/2024.       LASIK 2009        OBJECTIVE: See Ophthalmology exam    ASSESSMENT:    ICD-10-CM    1. Routine eye exam  Z01.00           PLAN:     Patient Instructions   Fill glasses prescription  Allow 2 weeks to adapt to change in glasses  Over the counter readers for computer right lens +1.50, left -1.00, or try +1.50     Use over the counter artificial tears 2-3 times a day ( Thera Tears, Refresh Relieva , Systane Complete )   Use Refresh Optive Gel drops or Systane Gel drops  before sleep   Use warm compresses 10 minutes - Christine warm compress- holds heat and moisture       Return in 1 year for eye exam    Elaine Mcgee O.D.  [facility]  [address] [place] [address] [phone]        Again, thank you for allowing me to participate in the care of your  patient.        Sincerely,        Elaine Mcgee, OD

## 2024-10-26 DIAGNOSIS — I10 HYPERTENSION GOAL BP (BLOOD PRESSURE) < 140/90: ICD-10-CM

## 2024-10-26 DIAGNOSIS — E28.2 PCOS (POLYCYSTIC OVARIAN SYNDROME): ICD-10-CM

## 2024-10-26 DIAGNOSIS — F33.0 MAJOR DEPRESSIVE DISORDER, RECURRENT EPISODE, MILD (H): ICD-10-CM

## 2024-10-27 DIAGNOSIS — F33.0 MAJOR DEPRESSIVE DISORDER, RECURRENT EPISODE, MILD (H): ICD-10-CM

## 2024-10-27 DIAGNOSIS — E28.2 PCOS (POLYCYSTIC OVARIAN SYNDROME): ICD-10-CM

## 2024-10-28 RX ORDER — CITALOPRAM HYDROBROMIDE 20 MG/1
20 TABLET ORAL DAILY
Qty: 90 TABLET | Refills: 1 | OUTPATIENT
Start: 2024-10-28

## 2024-10-28 RX ORDER — LOSARTAN POTASSIUM 50 MG/1
50 TABLET ORAL DAILY
Qty: 90 TABLET | Refills: 1 | Status: SHIPPED | OUTPATIENT
Start: 2024-10-28

## 2024-10-28 RX ORDER — CITALOPRAM HYDROBROMIDE 20 MG/1
20 TABLET ORAL DAILY
Qty: 90 TABLET | Refills: 1 | Status: SHIPPED | OUTPATIENT
Start: 2024-10-28

## 2024-11-05 ENCOUNTER — LAB (OUTPATIENT)
Dept: LAB | Facility: CLINIC | Age: 49
End: 2024-11-05
Payer: COMMERCIAL

## 2024-11-05 DIAGNOSIS — I10 HYPERTENSION GOAL BP (BLOOD PRESSURE) < 140/90: ICD-10-CM

## 2024-11-05 PROCEDURE — 36415 COLL VENOUS BLD VENIPUNCTURE: CPT

## 2024-11-05 PROCEDURE — 80048 BASIC METABOLIC PNL TOTAL CA: CPT

## 2024-11-06 LAB
ANION GAP SERPL CALCULATED.3IONS-SCNC: 10 MMOL/L (ref 7–15)
BUN SERPL-MCNC: 5.5 MG/DL (ref 6–20)
CALCIUM SERPL-MCNC: 9.5 MG/DL (ref 8.8–10.4)
CHLORIDE SERPL-SCNC: 103 MMOL/L (ref 98–107)
CREAT SERPL-MCNC: 0.67 MG/DL (ref 0.51–0.95)
EGFRCR SERPLBLD CKD-EPI 2021: >90 ML/MIN/1.73M2
GLUCOSE SERPL-MCNC: 128 MG/DL (ref 70–99)
HCO3 SERPL-SCNC: 25 MMOL/L (ref 22–29)
POTASSIUM SERPL-SCNC: 3.9 MMOL/L (ref 3.4–5.3)
SODIUM SERPL-SCNC: 138 MMOL/L (ref 135–145)

## 2025-01-02 NOTE — PATIENT INSTRUCTIONS
The ABCDEs of Melanoma    Skin cancer can develop anywhere on the skin. Ask someone for help when checking your skin, especially in hard to see places. If you notice a mole different from others, or that changes, enlarges, itches, or bleeds (even if it is small), you should see a dermatologist.

## 2025-01-02 NOTE — PROGRESS NOTES
[facility] Dermatology Note  Encounter Date: Jan 9, 2025  Office Visit     Reviewed patients past medical history and pertinent chart review prior to patients visit today.     Dermatology Problem List:      1. History of dysplastic nevi  - Compound dysplastic nevus with mild atypia, mid lower back. Bx proven on 5/17/23 ** regimentation noted in the central of the scar  -Compound dysplastic nevus with mild atypia, upper mid back, bx 12/22/20  -Collision of two compound dysplastic nevi, both with mild atypia, left upper arm, s/p biopsy 5/17/2016  -Compound dysplastic melanocytic nevus, central chest, 6/14/2012    -Junctional dysplastic nevus with moderate atypia, margins narrowly free, 2010, right arm medial  -Compound dysplastic nevus with mild atypia, margins free, 2010, right arm lateral  -Compound dysplastic nevus with moderate atypia, margins free, left forearm, 2010  -Compound dysplastic nevus with mild atypia, left chest, 2010  -Compound dysplastic nevus with mild atypia, margins free, right inner arm, 2010  -Compound dysplastic nevus with moderate atypia, right ear, 2009  -Junctional dysplastic nevus with moderate atypia, right buttock, s/p excision 2009  -Junctional dysplastic nevus with moderate ayptia, negative margin, right posterior shoulder, 2009  -Inflamed junctional dysplastic nevus with moderate atypica, right superior shoulder, neg margins, 2009  -Junctional dysplastic nevus with moderate atypia, right anterior arm, 2009  -Junctional dysplastic nevus with moderate atypia, left superior shoulder, left inferior shoulder, left inner arm, 2009  -Dysplastic nevi, right chest and left breast  -Irritated compound nevus, left buttock with mild to moderate cystologic atypia, 2007  2. Acne vulgaris  -Current Tx: Tretinoin 0.025% cream  -Previous Tx:  Differin, minocycline with dyspigmentation, doxycycline  3. Halo nevus, right neck, 2010  4. Benign biopsies  - Hemangioma, R abdomen, Bx  proven on 5/17/23  - Compound melanocytic nevus, left third toe, bx 12/22/20  -Intradermal melanocytic nevus,  L mid back, s/p Biopsy proven on 05/26/24  5. Dermatitis, left lateral inferior flank  - Empiric TAC 0.1% cream, consider biopsy if fails to resolve after 1 month  6. Flat wart, x 1 R nasal ala S/p cryo        Family Hx: Aunt with melanoma. Mom had vulvar sarcoma, had a MOHS recently, unsure type    ____________________________________________    Assessment & Plan:     # open comedone, left cheek  - Benign nature of skin lesion reviewed with patient.    - As patient desired treatment and lesion is bothersome, recommended tretinoin 0.025%. Patient to begin using a pea sized amount to face starting every other night, increasing to nightly as tolerated. Follow application with moisturizer.   - Patient was interested in treatment with extraction. Reviewed this may no be covered by insurance. Comedone were extracted from the left cheek with an 11 blade and Qtips after being cleansed with an alcohol swab. Pinpoint bleeding noted. Wound care discussed.    Follow up: May 2025 for [facility], sooner if needed     Ludivina Casey PA-C  [facility]  Dermatology    _______________________________________    CC: No chief complaint on file.    HPI:  Ms. Ivania Gomez is a(n) 49 year old female who presents today as a return patient for a lesion of concern on her left cheek, that doesn't seem to go away. This lesion has been present for several months and can be bothersome. She is wondering if it is of concern.     Patient is otherwise feeling well, without additional skin concerns.      Physical Exam:  SKIN: Focused examination of left cheek was performed.  - left cheek, dilated comedone    - No other lesions of concern on areas examined.     Medications:  Current Outpatient Medications   Medication Sig Dispense Refill    cetirizine-pseudoePHEDrine ER ([employer] ALLERGY RELIEF D) 5-120 MG 12 hr tablet TAKE 1 TABLET BY MOUTH  TWICE A DAY*NOT COVD BY INSUR 180 tablet 3    citalopram (CELEXA) 20 MG tablet Take 1 tablet (20 mg) by mouth daily. 90 tablet 1    fluticasone (FLONASE) 50 MCG/ACT nasal spray SPRAY 1 SPRAY INTO BOTH NOSTRILS 2 TIMES DAILY 48 mL 3    losartan (COZAAR) 50 MG tablet TAKE 1 TABLET BY MOUTH EVERY DAY 90 tablet 1    MELATONIN PO Take 5 mg by mouth      metFORMIN (GLUCOPHAGE) 500 MG tablet TAKE 1 TABLET BY MOUTH TWICE A DAY WITH FOOD 180 tablet 1    multivitamin w/minerals (THERA-VIT-M) tablet Take 1 tablet by mouth daily       No current facility-administered medications for this visit.      Past Medical History:   Patient Active Problem List   Diagnosis    CARDIOVASCULAR SCREENING; LDL GOAL LESS THAN 160    Mild major depression (H)    PCOS (polycystic ovarian syndrome)    S/P laparoscopic cholecystectomy    Acne vulgaris    History of dysplastic nevus    Melanocytic nevus    History of gestational diabetes    Chronic rhinitis    Hypertriglyceridemia    Submucous leiomyoma of uterus    Excessive or frequent menstruation    Anxiety    Hypertension goal BP (blood pressure) < 140/90    Major depressive disorder, recurrent episode, mild (H)    Seasonal allergic rhinitis, unspecified trigger     Past Medical History:   Diagnosis Date    Acne vulgaris     Allergies     Anxiety     ASCUS on Pap smear 1994    colp benign    Depressive disorder     post partum and anxiety issues    Diabetes mellitus (H)     History of dysplastic nevus     Hoffa's fat pad disease (H)     Hypertension goal BP (blood pressure) < 140/90 02/08/2023    Melanocytic nevus 06/14/2012    Obesity, unspecified     PCOS (polycystic ovarian syndrome)        CC No referring provider defined for this encounter. on close of this encounter.

## 2025-01-09 ENCOUNTER — OFFICE VISIT (OUTPATIENT)
Dept: DERMATOLOGY | Facility: CLINIC | Age: 50
End: 2025-01-09
Payer: COMMERCIAL

## 2025-01-09 DIAGNOSIS — L70.0 OPEN COMEDONE: Primary | ICD-10-CM

## 2025-01-09 RX ORDER — TRETINOIN 0.25 MG/G
CREAM TOPICAL
Qty: 45 G | Refills: 3 | Status: SHIPPED | OUTPATIENT
Start: 2025-01-09

## 2025-01-09 ASSESSMENT — PAIN SCALES - GENERAL: PAINLEVEL_OUTOF10: NO PAIN (0)

## 2025-01-09 NOTE — LETTER
1/9/2025      Ivania Gomez  [id number] [facility] [address]      Dear Colleague,    Thank you for referring your patient, Ivania Gomez, to the [facility]. Please see a copy of my visit note below.    [facility] Dermatology Note  Encounter Date: Jan 9, 2025  Office Visit     Reviewed patients past medical history and pertinent chart review prior to patients visit today.     Dermatology Problem List:      1. History of dysplastic nevi  - Compound dysplastic nevus with mild atypia, mid lower back. Bx proven on 5/17/23 ** regimentation noted in the central of the scar  -Compound dysplastic nevus with mild atypia, upper mid back, bx 12/22/20  -Collision of two compound dysplastic nevi, both with mild atypia, left upper arm, s/p biopsy 5/17/2016  -Compound dysplastic melanocytic nevus, central chest, 6/14/2012    -Junctional dysplastic nevus with moderate atypia, margins narrowly free, 2010, right arm medial  -Compound dysplastic nevus with mild atypia, margins free, 2010, right arm lateral  -Compound dysplastic nevus with moderate atypia, margins free, left forearm, 2010  -Compound dysplastic nevus with mild atypia, left chest, 2010  -Compound dysplastic nevus with mild atypia, margins free, right inner arm, 2010  -Compound dysplastic nevus with moderate atypia, right ear, 2009  -Junctional dysplastic nevus with moderate atypia, right buttock, s/p excision 2009  -Junctional dysplastic nevus with moderate ayptia, negative margin, right posterior shoulder, 2009  -Inflamed junctional dysplastic nevus with moderate atypica, right superior shoulder, neg margins, 2009  -Junctional dysplastic nevus with moderate atypia, right anterior arm, 2009  -Junctional dysplastic nevus with moderate atypia, left superior shoulder, left inferior shoulder, left inner arm, 2009  -Dysplastic nevi, right chest and left breast  -Irritated compound nevus, left buttock with mild to  moderate cystologic atypia, 2007  2. Acne vulgaris  -Current Tx: Tretinoin 0.025% cream  -Previous Tx:  Differin, minocycline with dyspigmentation, doxycycline  3. Halo nevus, right neck, 2010  4. Benign biopsies  - Hemangioma, R abdomen, Bx proven on 5/17/23  - Compound melanocytic nevus, left third toe, bx 12/22/20  -Intradermal melanocytic nevus,  L mid back, s/p Biopsy proven on 05/26/24  5. Dermatitis, left lateral inferior flank  - Empiric TAC 0.1% cream, consider biopsy if fails to resolve after 1 month  6. Flat wart, x 1 R nasal ala S/p cryo        Family Hx: Aunt with melanoma. Mom had vulvar sarcoma, had a MOHS recently, unsure type    ____________________________________________    Assessment & Plan:     # open comedone, left cheek  - Benign nature of skin lesion reviewed with patient.    - As patient desired treatment and lesion is bothersome, recommended tretinoin 0.025%. Patient to begin using a pea sized amount to face starting every other night, increasing to nightly as tolerated. Follow application with moisturizer.   - Patient was interested in treatment with extraction. Reviewed this may no be covered by insurance. Comedone were extracted from the left cheek with an 11 blade and Qtips after being cleansed with an alcohol swab. Pinpoint bleeding noted. Wound care discussed.    [place] [facility] sooner if needed     Ludivina Casey PA-C  [facility]  Dermatology    _______________________________________    CC: No chief complaint on file.    HPI:  Ms. Ivania Gomez is a(n) 49 year old female who presents today as a return patient for a lesion of concern on her left cheek, that doesn't seem to go away. This lesion has been present for several months and can be bothersome. She is wondering if it is of concern.     Patient is otherwise feeling well, without additional skin concerns.      Physical Exam:  SKIN: Focused examination of left cheek was performed.  - left cheek, dilated  comedone    - No other lesions of concern on areas examined.     Medications:  Current Outpatient Medications   Medication Sig Dispense Refill     cetirizine-pseudoePHEDrine ER ([employer] ALLERGY RELIEF D) 5-120 MG 12 hr tablet TAKE 1 TABLET BY MOUTH TWICE A DAY*NOT COVD BY INSUR 180 tablet 3     citalopram (CELEXA) 20 MG tablet Take 1 tablet (20 mg) by mouth daily. 90 tablet 1     fluticasone (FLONASE) 50 MCG/ACT nasal spray SPRAY 1 SPRAY INTO BOTH NOSTRILS 2 TIMES DAILY 48 mL 3     losartan (COZAAR) 50 MG tablet TAKE 1 TABLET BY MOUTH EVERY DAY 90 tablet 1     MELATONIN PO Take 5 mg by mouth       metFORMIN (GLUCOPHAGE) 500 MG tablet TAKE 1 TABLET BY MOUTH TWICE A DAY WITH FOOD 180 tablet 1     multivitamin w/minerals (THERA-VIT-M) tablet Take 1 tablet by mouth daily       No current facility-administered medications for this visit.      Past Medical History:   Patient Active Problem List   Diagnosis     CARDIOVASCULAR SCREENING; LDL GOAL LESS THAN 160     Mild major depression (H)     PCOS (polycystic ovarian syndrome)     S/P laparoscopic cholecystectomy     Acne vulgaris     History of dysplastic nevus     Melanocytic nevus     History of gestational diabetes     Chronic rhinitis     Hypertriglyceridemia     Submucous leiomyoma of uterus     Excessive or frequent menstruation     Anxiety     Hypertension goal BP (blood pressure) < 140/90     Major depressive disorder, recurrent episode, mild (H)     Seasonal allergic rhinitis, unspecified trigger     Past Medical History:   Diagnosis Date     Acne vulgaris      Allergies      Anxiety      ASCUS on Pap smear 1994    colp benign     Depressive disorder     post partum and anxiety issues     Diabetes mellitus (H)      History of dysplastic nevus      Hoffa's fat pad disease (H)      Hypertension goal BP (blood pressure) < 140/90 02/08/2023     Melanocytic nevus 06/14/2012     Obesity, unspecified      PCOS (polycystic ovarian syndrome)        CC No referring provider  defined for this encounter. on close of this encounter.       Again, thank you for allowing me to participate in the care of your patient.        Sincerely,        Ludivina Casey PA-C    Electronically signed

## 2025-03-04 DIAGNOSIS — J30.2 SEASONAL ALLERGIC RHINITIS, UNSPECIFIED TRIGGER: ICD-10-CM

## 2025-03-05 RX ORDER — CETIRIZINE HCL 10 MG
TABLET ORAL
Qty: 180 TABLET | Refills: 3 | Status: SHIPPED | OUTPATIENT
Start: 2025-03-05

## 2025-03-06 ENCOUNTER — E-VISIT (OUTPATIENT)
Dept: FAMILY MEDICINE | Facility: CLINIC | Age: 50
End: 2025-03-06
Payer: COMMERCIAL

## 2025-03-06 ENCOUNTER — MYC MEDICAL ADVICE (OUTPATIENT)
Dept: FAMILY MEDICINE | Facility: CLINIC | Age: 50
End: 2025-03-06
Payer: COMMERCIAL

## 2025-03-06 DIAGNOSIS — F40.243 FEAR OF FLYING: Primary | ICD-10-CM

## 2025-03-06 RX ORDER — ALPRAZOLAM 0.5 MG
TABLET ORAL
Qty: 6 TABLET | Refills: 0 | Status: SHIPPED | OUTPATIENT
Start: 2025-03-06

## 2025-03-06 ASSESSMENT — ANXIETY QUESTIONNAIRES
7. FEELING AFRAID AS IF SOMETHING AWFUL MIGHT HAPPEN: SEVERAL DAYS
1. FEELING NERVOUS, ANXIOUS, OR ON EDGE: SEVERAL DAYS
IF YOU CHECKED OFF ANY PROBLEMS ON THIS QUESTIONNAIRE, HOW DIFFICULT HAVE THESE PROBLEMS MADE IT FOR YOU TO DO YOUR WORK, TAKE CARE OF THINGS AT HOME, OR GET ALONG WITH OTHER PEOPLE: NOT DIFFICULT AT ALL
2. NOT BEING ABLE TO STOP OR CONTROL WORRYING: SEVERAL DAYS
7. FEELING AFRAID AS IF SOMETHING AWFUL MIGHT HAPPEN: SEVERAL DAYS
5. BEING SO RESTLESS THAT IT IS HARD TO SIT STILL: NOT AT ALL
8. IF YOU CHECKED OFF ANY PROBLEMS, HOW DIFFICULT HAVE THESE MADE IT FOR YOU TO DO YOUR WORK, TAKE CARE OF THINGS AT HOME, OR GET ALONG WITH OTHER PEOPLE?: NOT DIFFICULT AT ALL
GAD7 TOTAL SCORE: 3
4. TROUBLE RELAXING: NOT AT ALL
3. WORRYING TOO MUCH ABOUT DIFFERENT THINGS: NOT AT ALL
6. BECOMING EASILY ANNOYED OR IRRITABLE: NOT AT ALL
GAD7 TOTAL SCORE: 3

## 2025-03-21 SDOH — HEALTH STABILITY: PHYSICAL HEALTH: ON AVERAGE, HOW MANY MINUTES DO YOU ENGAGE IN EXERCISE AT THIS LEVEL?: 30 MIN

## 2025-03-21 SDOH — HEALTH STABILITY: PHYSICAL HEALTH: ON AVERAGE, HOW MANY DAYS PER WEEK DO YOU ENGAGE IN MODERATE TO STRENUOUS EXERCISE (LIKE A BRISK WALK)?: 2 DAYS

## 2025-03-21 ASSESSMENT — SOCIAL DETERMINANTS OF HEALTH (SDOH): HOW OFTEN DO YOU GET TOGETHER WITH FRIENDS OR RELATIVES?: ONCE A WEEK

## 2025-03-24 ENCOUNTER — OFFICE VISIT (OUTPATIENT)
Dept: FAMILY MEDICINE | Facility: CLINIC | Age: 50
End: 2025-03-24
Attending: FAMILY MEDICINE
Payer: COMMERCIAL

## 2025-03-24 VITALS
HEIGHT: 65 IN | TEMPERATURE: 98.4 F | DIASTOLIC BLOOD PRESSURE: 87 MMHG | WEIGHT: 185 LBS | BODY MASS INDEX: 30.82 KG/M2 | SYSTOLIC BLOOD PRESSURE: 134 MMHG | HEART RATE: 102 BPM | OXYGEN SATURATION: 100 % | RESPIRATION RATE: 14 BRPM

## 2025-03-24 DIAGNOSIS — I10 HYPERTENSION GOAL BP (BLOOD PRESSURE) < 140/90: ICD-10-CM

## 2025-03-24 DIAGNOSIS — E28.2 PCOS (POLYCYSTIC OVARIAN SYNDROME): ICD-10-CM

## 2025-03-24 DIAGNOSIS — R00.0 ELEVATED PULSE RATE: ICD-10-CM

## 2025-03-24 DIAGNOSIS — J30.2 SEASONAL ALLERGIC RHINITIS, UNSPECIFIED TRIGGER: ICD-10-CM

## 2025-03-24 DIAGNOSIS — Z12.31 ENCOUNTER FOR SCREENING MAMMOGRAM FOR BREAST CANCER: ICD-10-CM

## 2025-03-24 DIAGNOSIS — F33.0 MAJOR DEPRESSIVE DISORDER, RECURRENT EPISODE, MILD: ICD-10-CM

## 2025-03-24 DIAGNOSIS — Z00.00 ROUTINE GENERAL MEDICAL EXAMINATION AT A HEALTH CARE FACILITY: Primary | ICD-10-CM

## 2025-03-24 DIAGNOSIS — Z13.6 CARDIOVASCULAR SCREENING; LDL GOAL LESS THAN 160: ICD-10-CM

## 2025-03-24 LAB
EST. AVERAGE GLUCOSE BLD GHB EST-MCNC: 111 MG/DL
HBA1C MFR BLD: 5.5 % (ref 0–5.6)

## 2025-03-24 PROCEDURE — 83036 HEMOGLOBIN GLYCOSYLATED A1C: CPT | Performed by: FAMILY MEDICINE

## 2025-03-24 PROCEDURE — 36415 COLL VENOUS BLD VENIPUNCTURE: CPT | Performed by: FAMILY MEDICINE

## 2025-03-24 PROCEDURE — 80061 LIPID PANEL: CPT | Performed by: FAMILY MEDICINE

## 2025-03-24 PROCEDURE — 84443 ASSAY THYROID STIM HORMONE: CPT | Performed by: FAMILY MEDICINE

## 2025-03-24 PROCEDURE — 80048 BASIC METABOLIC PNL TOTAL CA: CPT | Performed by: FAMILY MEDICINE

## 2025-03-24 RX ORDER — LOSARTAN POTASSIUM 50 MG/1
50 TABLET ORAL DAILY
Qty: 90 TABLET | Refills: 1 | Status: SHIPPED | OUTPATIENT
Start: 2025-03-24

## 2025-03-24 RX ORDER — CITALOPRAM HYDROBROMIDE 20 MG/1
20 TABLET ORAL DAILY
Qty: 90 TABLET | Refills: 1 | Status: SHIPPED | OUTPATIENT
Start: 2025-03-24

## 2025-03-24 RX ORDER — FLUTICASONE PROPIONATE 50 MCG
SPRAY, SUSPENSION (ML) NASAL
Qty: 48 ML | Refills: 3 | Status: SHIPPED | OUTPATIENT
Start: 2025-03-24

## 2025-03-24 ASSESSMENT — PAIN SCALES - GENERAL: PAINLEVEL_OUTOF10: NO PAIN (0)

## 2025-03-24 NOTE — PROGRESS NOTES
"Preventive Care Visit  [facility]  Jacquelyn Zaldivar MD, Family Medicine  Mar 24, 2025      Assessment & Plan     (Z00.00) Routine general medical examination at a health care facility  (primary encounter diagnosis)  Comment: preventive needs reviewed   Plan: see orders in Epic.     (F33.0) Major depressive disorder, recurrent episode, mild  Comment: stable, doing well  Plan: citalopram (CELEXA) 20 MG tablet         Refill x 6 months     (J30.2) Seasonal allergic rhinitis, unspecified trigger  Comment: controlled  Plan: fluticasone (FLONASE) 50 MCG/ACT nasal spray        Also taking zyrtec-D - could be source of fast pulse, pt will trial zyrtec only to see if needs decongestant.    (I10) Hypertension goal BP (blood pressure) < 140/90  Comment: to goal  Plan: losartan (COZAAR) 50 MG tablet, Basic metabolic        panel  (Ca, Cl, CO2, Creat, Gluc, K, Na, BUN)         Refill x 6 months     (E28.2) PCOS (polycystic ovarian syndrome)  Comment: stable  Plan: metFORMIN (GLUCOPHAGE) 500 MG tablet,         Hemoglobin A1c         Refill x 6 months     (R00.0) Elevated pulse rate  Comment: likely side effects from decongestant  Plan: TSH with free T4 reflex        Check tsh      (Z13.6) CARDIOVASCULAR SCREENING; LDL GOAL LESS THAN 160  Comment: due  Plan: Lipid panel reflex to direct LDL Non-fasting            (Z12.31) Encounter for screening mammogram for breast cancer  Comment: due  Plan: MA Screen Bilateral w/Pop                    The longitudinal plan of care for the diagnosis(es)/condition(s) as documented were addressed during this visit. Due to the added complexity in care, I will continue to support Ivania in the subsequent management and with ongoing continuity of care.    BMI  Estimated body mass index is 31.26 kg/m  as calculated from the following:    Height as of this encounter: 1.638 m (5' 4.5\").    Weight as of this encounter: 83.9 kg (185 lb).   Weight management plan: Discussed healthy " diet and exercise guidelines    Counseling  Appropriate preventive services were addressed with this patient via screening, questionnaire, or discussion as appropriate for fall prevention, nutrition, physical activity, Tobacco-use cessation, social engagement, weight loss and cognition.  Checklist reviewing preventive services available has been given to the patient.  Reviewed patient's diet, addressing concerns and/or questions.   She is at risk for lack of exercise and has been provided with information to increase physical activity for the benefit of her well-being.       See Patient Instructions    Subjective   Ivania is a 49 year old, presenting for the following:  Physical        3/24/2025     1:09 PM   Additional Questions   Roomed by Shae Kaufman MA   Accompanied by Self          HPI   Frequently has fast pulse in low 100's, down to 90's at rest, she is wondering why.  No shortness of breath, cp, palpitations.          Advance Care Planning  Patient does not have a Health Care Directive: Discussed advance care planning with patient; however, patient declined at this time.      3/21/2025   General Health   How would you rate your overall physical health? Good   Feel stress (tense, anxious, or unable to sleep) Only a little   (!) STRESS CONCERN      3/21/2025   Nutrition   Three or more servings of calcium each day? Yes   Diet: Regular (no restrictions)   How many servings of fruit and vegetables per day? (!) 2-3   How many sweetened beverages each day? 0-1         3/21/2025   Exercise   Days per week of moderate/strenous exercise 2 days   Average minutes spent exercising at this level 30 min   (!) EXERCISE CONCERN      3/21/2025   Social Factors   Frequency of gathering with friends or relatives Once a week   Worry food won't last until get money to buy more No   Food not last or not have enough money for food? No   Do you have housing? (Housing is defined as stable permanent housing and does not include staying  ouside in a car, in a tent, in an abandoned building, in an overnight shelter, or couch-surfing.) Yes   Are you worried about losing your housing? No   Lack of transportation? No   Unable to get utilities (heat,electricity)? No         3/21/2025   Dental   Dentist two times every year? Yes           3/18/2024   TB Screening   Were you born outside of the [place]? No           Today's PHQ-9 Score:       3/24/2025     1:04 PM   PHQ-9 SCORE   PHQ-9 Total Score MyChart 1 (Minimal depression)   PHQ-9 Total Score 1        Patient-reported           3/21/2025   Substance Use   Alcohol more than 3/day or more than 7/wk No   Do you use any other substances recreationally? No     Social History     Tobacco Use    Smoking status: Never    Smokeless tobacco: Never    Tobacco comments:     nonsmoking household   Vaping Use    Vaping status: Never Used   Substance Use Topics    Alcohol use: Yes     Comment: occasional    Drug use: No           8/8/2024   LAST FHS-7 RESULTS   1st degree relative breast or ovarian cancer No   Any relative bilateral breast cancer No   Any male have breast cancer No   Any ONE woman have BOTH breast AND ovarian cancer No   Any woman with breast cancer before 50yrs No   2 or more relatives with breast AND/OR ovarian cancer No   2 or more relatives with breast AND/OR bowel cancer No        Mammogram Screening - Mammogram every 1-2 years updated in Health Maintenance based on mutual decision making    G 2 P 2   Patient's last menstrual period was 04/29/2018 (exact date).     Fasting: No   Td: tdap 2/2022       Flu: 12/2023      Covid: 12/2023      Shingrix: NA      PPV: NA       RSV: NA               Cholesterol:   Lab Results   Component Value Date    CHOL 192 03/20/2024    CHOL 191 03/26/2020     Lab Results   Component Value Date    HDL 39 03/20/2024    HDL 40 03/26/2020     Lab Results   Component Value Date    LDL 94 03/20/2024     03/26/2020     Lab Results   Component Value Date    TRIG 296  2024    TRIG 174 2020     Lab Results   Component Value Date    ISSA 5.2 2015         MM2024  Dexa:  NA     Flex/colo: 2024 q7y scope      Seat Belt: Yes    Sunscreen use: Yes   Calcium Intake: adeq  Health Care Directive: Yes   Sexually Active: Yes     Current contraception: hysterectomy  History of abnormal Pap smear: No  Family history of colon/breast/ovarian cancer: No  Regular self breast exam: Yes  History of abnormal mammogram: No          3/21/2025   STI Screening   New sexual partner(s) since last STI/HIV test? No     History of abnormal Pap smear: No - age 30- 64 PAP with HPV every 5 years recommended        Latest Ref Rng & Units 2017    12:17 PM 2017    12:12 PM 2014    12:00 AM   PAP / HPV   PAP (Historical)   NIL  NIL    HPV 16 DNA NEG Negative      HPV 18 DNA NEG Negative      Other HR HPV NEG Negative        ASCVD Risk   The 10-year ASCVD risk score (Michelle FOREMAN, et al., 2019) is: 3.2%    Values used to calculate the score:      Age: 49 years      Sex: Female      Is Non- : No      Diabetic: No      Tobacco smoker: No      Systolic Blood Pressure: 134 mmHg      Is BP treated: Yes      HDL Cholesterol: 38 mg/dL      Total Cholesterol: 223 mg/dL       Reviewed and updated as needed this visit by Provider   Tobacco  Allergies  Meds  Problems  Med Hx  Surg Hx  Fam Hx            BP Readings from Last 3 Encounters:   25 134/87   24 132/84   24 (!) 133/95    Wt Readings from Last 3 Encounters:   25 83.9 kg (185 lb)   24 83.9 kg (185 lb)   23 80.6 kg (177 lb 12.8 oz)                  Patient Active Problem List   Diagnosis    CARDIOVASCULAR SCREENING; LDL GOAL LESS THAN 160    Mild major depression (H)    PCOS (polycystic ovarian syndrome)    S/P laparoscopic cholecystectomy    Acne vulgaris    History of dysplastic nevus    Melanocytic nevus    History of gestational diabetes    Chronic  rhinitis    Hypertriglyceridemia    Submucous leiomyoma of uterus    Excessive or frequent menstruation    Anxiety    Hypertension goal BP (blood pressure) < 140/90    Major depressive disorder, recurrent episode, mild    Seasonal allergic rhinitis, unspecified trigger     Past Surgical History:   Procedure Laterality Date    BIOPSY      moles removed    CHOLECYSTECTOMY  2011    COLONOSCOPY N/A 01/17/2024    Procedure: COLONOSCOPY, FLEXIBLE, WITH LESION REMOVAL USING SNARE;  Surgeon: Bal Omer MD;  Location: [facility] OR    COLONOSCOPY WITH CO2 INSUFFLATION N/A 01/17/2024    Procedure: Colonoscopy with CO2 insufflation;  Surgeon: Bal Omer MD;  Location: [facility] OR    COLPOSCOPY CERVIX, BIOPSY CERVIX, ENDOCERVICAL CURETTAGE, COMBINED  1994    benign    EYE SURGERY      Lasik    HYSTERECTOMY, PAP NO LONGER INDICATED  05/25/2018    hysterectomy with bilateral salpingectomy, ovaries remain    LASIK  2009       Social History     Tobacco Use    Smoking status: Never    Smokeless tobacco: Never    Tobacco comments:     nonsmoking household   Substance Use Topics    Alcohol use: Yes     Comment: occasional     Family History   Problem Relation Age of Onset    Cancer Mother         vulvar sarcoma    Hypertension Mother     Other Cancer Mother         vulvar sarcoma    Thyroid Disease Mother     Eye Surgery Father         cataracts    Colon Polyps Father     Hypertension Maternal Grandmother     Cancer Maternal Grandfather         lung    Eye Surgery Paternal Grandmother         cataracts    Prostate Cancer Paternal Grandfather     Hyperlipidemia Brother     Thyroid Disease Sister     Anxiety Disorder Sister     Thyroid Disease Sister     Glaucoma No family hx of     Macular Degeneration No family hx of          Current Outpatient Medications   Medication Sig Dispense Refill    ALPRAZolam (XANAX) 0.5 MG tablet Take 1 tablet 30 minutes prior to the flight, ok to repeat dose if still anxious after 30 minutes 6  "tablet 0    cetirizine-pseudoePHEDrine ER ([employer] ALLERGY RELIEF-D) 5-120 MG 12 hr tablet TAKE 1 TABLET BY MOUTH TWICE A DAY*NOT COVD BY INSUR 180 tablet 3    citalopram (CELEXA) 20 MG tablet Take 1 tablet (20 mg) by mouth daily. 90 tablet 1    fluticasone (FLONASE) 50 MCG/ACT nasal spray SPRAY 1 SPRAY INTO BOTH NOSTRILS 2 TIMES DAILY 48 mL 3    losartan (COZAAR) 50 MG tablet Take 1 tablet (50 mg) by mouth daily. 90 tablet 1    MELATONIN PO Take 5 mg by mouth      metFORMIN (GLUCOPHAGE) 500 MG tablet TAKE 1 TABLET BY MOUTH TWICE A DAY WITH FOOD 180 tablet 1    multivitamin w/minerals (THERA-VIT-M) tablet Take 1 tablet by mouth daily      tretinoin (RETIN-A) 0.025 % external cream Apply a pea-sized amount to face starting every other night, increasing to nightly as tolerated. Follow application with moisturizer. 45 g 3     Recent Labs   Lab Test 03/24/25  1350 11/05/24  1248 03/20/24  1122 08/30/23  1107 02/07/23  1303 02/02/22  0742 02/01/21  0937 03/26/20  1004   A1C 5.5  --  5.5  --  5.3   < >  --   --    *  --  94  --  106*   < >  --  116*   HDL 38*  --  39*  --  37*   < >  --  40*   TRIG 201*  --  296*  --  262*   < >  --  174*   CR 0.73 0.67 0.71   < > 0.70   < > 0.72 0.72   GFRESTIMATED >90 >90 >90   < > >90   < > >90 >90   GFRESTBLACK  --   --   --   --   --   --  >90 >90   POTASSIUM 4.5 3.9 4.2   < > 4.4   < > 4.0 4.0   TSH 2.36  --   --   --   --   --  1.32  --     < > = values in this interval not displayed.          Review of Systems  Constitutional, neuro, ENT, endocrine, pulmonary, cardiac, gastrointestinal, genitourinary, musculoskeletal, integument and psychiatric systems are negative, except as otherwise noted.     Objective    Exam  /87   Pulse 102   Temp 98.4  F (36.9  C) (Tympanic)   Resp 14   Ht 1.638 m (5' 4.5\")   Wt 83.9 kg (185 lb)   LMP 04/29/2018 (Exact Date)   SpO2 100%   Breastfeeding No   BMI 31.26 kg/m     Estimated body mass index is 31.26 kg/m  as calculated from the " "following:    Height as of this encounter: 1.638 m (5' 4.5\").    Weight as of this encounter: 83.9 kg (185 lb).    Physical Exam  GENERAL: alert and no distress  EYES: Eyes grossly normal to inspection, PERRL and conjunctivae and sclerae normal  HENT: ear canals and TM's normal, nose and mouth without ulcers or lesions  NECK: no adenopathy, no asymmetry, masses, or scars  RESP: lungs clear to auscultation - no rales, rhonchi or wheezes  BREAST: normal without masses, tenderness or nipple discharge and no palpable axillary masses or adenopathy  CV: regular rate and rhythm, normal S1 S2, no S3 or S4, no murmur, click or rub, no peripheral edema  ABDOMEN: soft, nontender, no hepatosplenomegaly, no masses and bowel sounds normal  MS: no gross musculoskeletal defects noted, no edema  SKIN: no suspicious lesions or rashes  NEURO: Normal strength and tone, mentation intact and speech normal  PSYCH: mentation appears normal, affect normal/bright        Signed Electronically by: Jacquelyn Zaldivar MD    "

## 2025-03-24 NOTE — PATIENT INSTRUCTIONS
Patient Education   Preventive Care Advice   This is general advice given by our system to help you stay healthy. However, your care team may have specific advice just for you. Please talk to your care team about your preventive care needs.  Nutrition  Eat 5 or more servings of fruits and vegetables each day.  Try wheat bread, brown rice and whole grain pasta (instead of white bread, rice, and pasta).  Get enough calcium and vitamin D. Check the label on foods and aim for 100% of the RDA (recommended daily allowance).  Lifestyle  Exercise at least 150 minutes each week  (30 minutes a day, 5 days a week).  Do muscle strengthening activities 2 days a week. These help control your weight and prevent disease.  No smoking.  Wear sunscreen to prevent skin cancer.  Have a dental exam and cleaning every 6 months.  Yearly exams  See your health care team every year to talk about:  Any changes in your health.  Any medicines your care team has prescribed.  Preventive care, family planning, and ways to prevent chronic diseases.  Shots (vaccines)   HPV shots (up to age 26), if you've never had them before.  Hepatitis B shots (up to age 59), if you've never had them before.  COVID-19 shot: Get this shot when it's due.  Flu shot: Get a flu shot every year.  Tetanus shot: Get a tetanus shot every 10 years.  Pneumococcal, hepatitis A, and RSV shots: Ask your care team if you need these based on your risk.  Shingles shot (for age 50 and up)  General health tests  Diabetes screening:  Starting at age 35, Get screened for diabetes at least every 3 years.  If you are younger than age 35, ask your care team if you should be screened for diabetes.  Cholesterol test: At age 39, start having a cholesterol test every 5 years, or more often if advised.  Bone density scan (DEXA): At age 50, ask your care team if you should have this scan for osteoporosis (brittle bones).  Hepatitis C: Get tested at least once in your life.  STIs (sexually  transmitted infections)  Before age 24: Ask your care team if you should be screened for STIs.  After age 24: Get screened for STIs if you're at risk. You are at risk for STIs (including HIV) if:  You are sexually active with more than one person.  You don't use condoms every time.  You or a partner was diagnosed with a sexually transmitted infection.  If you are at risk for HIV, ask about PrEP medicine to prevent HIV.  Get tested for HIV at least once in your life, whether you are at risk for HIV or not.  Cancer screening tests  Cervical cancer screening: If you have a cervix, begin getting regular cervical cancer screening tests starting at age 21.  Breast cancer scan (mammogram): If you've ever had breasts, begin having regular mammograms starting at age 40. This is a scan to check for breast cancer.  Colon cancer screening: It is important to start screening for colon cancer at age 45.  Have a colonoscopy test every 10 years (or more often if you're at risk) Or, ask your provider about stool tests like a FIT test every year or Cologuard test every 3 years.  To learn more about your testing options, visit:   .  For help making a decision, visit:   https://bit.ly/zp78296.  Prostate cancer screening test: If you have a prostate, ask your care team if a prostate cancer screening test (PSA) at age 55 is right for you.  Lung cancer screening: If you are a current or former smoker ages 50 to 80, ask your care team if ongoing lung cancer screenings are right for you.  For informational purposes only. Not to replace the advice of your health care provider. Copyright   2023 [employer]. All rights reserved. Clinically reviewed by the [facility] Transitions Program. [employer] 256064 - REV 01/24.

## 2025-03-25 LAB
ANION GAP SERPL CALCULATED.3IONS-SCNC: 14 MMOL/L (ref 7–15)
BUN SERPL-MCNC: 6.2 MG/DL (ref 6–20)
CALCIUM SERPL-MCNC: 9.4 MG/DL (ref 8.8–10.4)
CHLORIDE SERPL-SCNC: 100 MMOL/L (ref 98–107)
CHOLEST SERPL-MCNC: 223 MG/DL
CREAT SERPL-MCNC: 0.73 MG/DL (ref 0.51–0.95)
EGFRCR SERPLBLD CKD-EPI 2021: >90 ML/MIN/1.73M2
FASTING STATUS PATIENT QL REPORTED: NO
FASTING STATUS PATIENT QL REPORTED: NO
GLUCOSE SERPL-MCNC: 93 MG/DL (ref 70–99)
HCO3 SERPL-SCNC: 23 MMOL/L (ref 22–29)
HDLC SERPL-MCNC: 38 MG/DL
LDLC SERPL CALC-MCNC: 145 MG/DL
NONHDLC SERPL-MCNC: 185 MG/DL
POTASSIUM SERPL-SCNC: 4.5 MMOL/L (ref 3.4–5.3)
SODIUM SERPL-SCNC: 137 MMOL/L (ref 135–145)
TRIGL SERPL-MCNC: 201 MG/DL
TSH SERPL DL<=0.005 MIU/L-ACNC: 2.36 UIU/ML (ref 0.3–4.2)

## 2025-08-14 DIAGNOSIS — F33.0 MAJOR DEPRESSIVE DISORDER, RECURRENT EPISODE, MILD: ICD-10-CM

## 2025-08-14 DIAGNOSIS — I10 HYPERTENSION GOAL BP (BLOOD PRESSURE) < 140/90: ICD-10-CM

## 2025-08-14 RX ORDER — LOSARTAN POTASSIUM 50 MG/1
50 TABLET ORAL DAILY
Qty: 90 TABLET | Refills: 1 | OUTPATIENT
Start: 2025-08-14

## 2025-08-14 RX ORDER — CITALOPRAM HYDROBROMIDE 20 MG/1
20 TABLET ORAL DAILY
Qty: 90 TABLET | Refills: 1 | OUTPATIENT
Start: 2025-08-14

## (undated) DEVICE — ESU ERBE FIAPC PROBE 2.3MMX220CM

## (undated) DEVICE — ENDO CATH ESOPH BALLOON 10-11-12MMX180CM CRE FIXED WIRE

## (undated) DEVICE — SYR 50ML SLIP TIP W/O NDL 309654

## (undated) DEVICE — SOL WATER IRRIG 1000ML BOTTLE 07139-09

## (undated) DEVICE — SPECIMEN CONTAINER 60MLW/10% FORMALIN 59601

## (undated) DEVICE — ENDO CATH ESOPH BALLOON 18-19-20MMX180CM CRE FIXED WIRE

## (undated) DEVICE — ENDO CATH ESOPH/PYL/COLONIC BALLOON 10-11-12MMX240CM CRE

## (undated) DEVICE — CUP DENTURE 7.5OZ GOLD DISP

## (undated) DEVICE — ENDO CATH ESOPH/PYL/COLONIC BALLOON 15-16.5-18MMX240CM CRE

## (undated) DEVICE — SUCTION TIP YANKAUER W/O VENT K86

## (undated) DEVICE — DEVICE RETRIEVAL ROTH NET PLATINUM UNIV 2.5MMX230CM 00715050

## (undated) DEVICE — ENDO MARKER SPOT 5ML

## (undated) DEVICE — SYR 50ML CATH TIP W/O NDL 309620

## (undated) DEVICE — ENDO CATH ESOPH BALLOON 15-16.5-18MMX180CM CRE FIXED WIRE

## (undated) DEVICE — Device

## (undated) DEVICE — SPECIMEN TRAP 80ML BUSSE BW407

## (undated) DEVICE — SWAB PROCTO 16" NON-STERILE

## (undated) DEVICE — ENDO CATH ESOPH/PYL/COLONIC BALLOON 18-19-20MMX240CM CRE

## (undated) DEVICE — SPECIMEN TRAP VACUUM SUCTION 003984-901

## (undated) DEVICE — SUCTION CANISTER MEDIVAC LINER 1500ML W/LID 65651-515

## (undated) DEVICE — SYR INFLATOR AND GAUGE 60ML M00550601

## (undated) DEVICE — ENDO FORCEP ENDOJAW BIOPSY 2.0MMX155CM FB-221K

## (undated) DEVICE — ENDO CATH ESOPH BALLOON 06-7-8MMX180CM CRE FIXED WIRE

## (undated) DEVICE — ENDO FORCEP ENDOJAW BIOPSY 2.8MMX230CM FB-220U

## (undated) DEVICE — ENDO CATH ESOPH BALLOON 12-13.5-15MMX180CM CRE FIXED WIRE

## (undated) DEVICE — ENDO CATH ESOPH/PYL/COLONIC BALLOON 08-9-10MMX240CM CRE

## (undated) DEVICE — ENDO SNARE OVAL 2.5X4.0CM W/25GA NDL

## (undated) DEVICE — ENDO CATH ESOPH/PYL/COLONIC BALLOON 12-13.5-15MMX240CM CRE

## (undated) DEVICE — ENDO CATH ESOPH BALLOON 08-9-10MMX180CM CRE FIXED WIRE

## (undated) RX ORDER — FENTANYL CITRATE 50 UG/ML
INJECTION, SOLUTION INTRAMUSCULAR; INTRAVENOUS
Status: DISPENSED
Start: 2024-01-17

## (undated) RX ORDER — ONDANSETRON 2 MG/ML
INJECTION INTRAMUSCULAR; INTRAVENOUS
Status: DISPENSED
Start: 2024-01-17